# Patient Record
Sex: FEMALE | Race: WHITE
[De-identification: names, ages, dates, MRNs, and addresses within clinical notes are randomized per-mention and may not be internally consistent; named-entity substitution may affect disease eponyms.]

---

## 2017-09-04 ENCOUNTER — HOSPITAL ENCOUNTER (INPATIENT)
Dept: HOSPITAL 8 - ED | Age: 52
LOS: 1 days | Discharge: HOME | DRG: 206 | End: 2017-09-05
Attending: HOSPITALIST | Admitting: HOSPITALIST
Payer: COMMERCIAL

## 2017-09-04 VITALS — DIASTOLIC BLOOD PRESSURE: 72 MMHG | SYSTOLIC BLOOD PRESSURE: 111 MMHG

## 2017-09-04 VITALS — BODY MASS INDEX: 23.88 KG/M2 | HEIGHT: 65 IN | WEIGHT: 143.3 LBS

## 2017-09-04 DIAGNOSIS — F17.210: ICD-10-CM

## 2017-09-04 DIAGNOSIS — M94.0: Primary | ICD-10-CM

## 2017-09-04 DIAGNOSIS — J42: ICD-10-CM

## 2017-09-04 LAB
BUN SERPL-MCNC: 14 MG/DL (ref 7–18)
HCT VFR BLD CALC: 45.3 % (ref 34.6–47.8)
HGB BLD-MCNC: 15.6 G/DL (ref 11.7–16.4)
IS PT STATUS REG ER OR PRE ER?: YES
WBC # BLD AUTO: 6.3 X10^3/UL (ref 3.4–10)

## 2017-09-04 PROCEDURE — 85025 COMPLETE CBC W/AUTO DIFF WBC: CPT

## 2017-09-04 PROCEDURE — 80048 BASIC METABOLIC PNL TOTAL CA: CPT

## 2017-09-04 PROCEDURE — 78452 HT MUSCLE IMAGE SPECT MULT: CPT

## 2017-09-04 PROCEDURE — 93017 CV STRESS TEST TRACING ONLY: CPT

## 2017-09-04 PROCEDURE — 87324 CLOSTRIDIUM AG IA: CPT

## 2017-09-04 PROCEDURE — C9898 INPNT STAY RADIOLABELED ITEM: HCPCS

## 2017-09-04 PROCEDURE — 82040 ASSAY OF SERUM ALBUMIN: CPT

## 2017-09-04 PROCEDURE — 36415 COLL VENOUS BLD VENIPUNCTURE: CPT

## 2017-09-04 PROCEDURE — 84443 ASSAY THYROID STIM HORMONE: CPT

## 2017-09-04 PROCEDURE — 93005 ELECTROCARDIOGRAM TRACING: CPT

## 2017-09-04 PROCEDURE — 84484 ASSAY OF TROPONIN QUANT: CPT

## 2017-09-04 PROCEDURE — 80061 LIPID PANEL: CPT

## 2017-09-04 PROCEDURE — A9502 TC99M TETROFOSMIN: HCPCS

## 2017-09-04 PROCEDURE — 71010: CPT

## 2017-09-05 VITALS — SYSTOLIC BLOOD PRESSURE: 123 MMHG | DIASTOLIC BLOOD PRESSURE: 83 MMHG

## 2017-09-05 VITALS — DIASTOLIC BLOOD PRESSURE: 82 MMHG | SYSTOLIC BLOOD PRESSURE: 125 MMHG

## 2017-09-05 VITALS — SYSTOLIC BLOOD PRESSURE: 121 MMHG | DIASTOLIC BLOOD PRESSURE: 77 MMHG

## 2017-09-05 LAB
IS PT STATUS REG ER OR PRE ER?: NO
IS PT STATUS REG ER OR PRE ER?: NO

## 2017-09-05 RX ADMIN — SODIUM CHLORIDE SCH MLS/HR: 0.9 INJECTION, SOLUTION INTRAVENOUS at 08:16

## 2017-09-05 RX ADMIN — SODIUM CHLORIDE SCH MLS/HR: 0.9 INJECTION, SOLUTION INTRAVENOUS at 00:59

## 2018-02-14 ENCOUNTER — HOSPITAL ENCOUNTER (EMERGENCY)
Dept: HOSPITAL 8 - ED | Age: 53
Discharge: HOME | End: 2018-02-14
Payer: SELF-PAY

## 2018-02-14 VITALS — BODY MASS INDEX: 24.09 KG/M2 | HEIGHT: 66 IN | WEIGHT: 149.91 LBS

## 2018-02-14 VITALS — SYSTOLIC BLOOD PRESSURE: 117 MMHG | DIASTOLIC BLOOD PRESSURE: 71 MMHG

## 2018-02-14 DIAGNOSIS — K29.20: Primary | ICD-10-CM

## 2018-02-14 DIAGNOSIS — Z72.89: ICD-10-CM

## 2018-02-14 DIAGNOSIS — F10.10: ICD-10-CM

## 2018-02-14 LAB
ALBUMIN SERPL-MCNC: 3.8 G/DL (ref 3.4–5)
ALP SERPL-CCNC: 109 U/L (ref 45–117)
ALT SERPL-CCNC: 80 U/L (ref 12–78)
ANION GAP SERPL CALC-SCNC: 14 MMOL/L (ref 5–15)
BASOPHILS # BLD AUTO: 0.02 X10^3/UL (ref 0–0.1)
BASOPHILS NFR BLD AUTO: 1 % (ref 0–1)
BILIRUB SERPL-MCNC: 0.6 MG/DL (ref 0.2–1)
CALCIUM SERPL-MCNC: 8.1 MG/DL (ref 8.5–10.1)
CHLORIDE SERPL-SCNC: 106 MMOL/L (ref 98–107)
CREAT SERPL-MCNC: 0.74 MG/DL (ref 0.55–1.02)
CULTURE INDICATED?: YES
EOSINOPHIL # BLD AUTO: 0.06 X10^3/UL (ref 0–0.4)
EOSINOPHIL NFR BLD AUTO: 1 % (ref 1–7)
ERYTHROCYTE [DISTWIDTH] IN BLOOD BY AUTOMATED COUNT: 13.3 % (ref 9.6–15.2)
LYMPHOCYTES # BLD AUTO: 1.65 X10^3/UL (ref 1–3.4)
LYMPHOCYTES NFR BLD AUTO: 36 % (ref 22–44)
MCH RBC QN AUTO: 35.9 PG (ref 27–34.8)
MCHC RBC AUTO-ENTMCNC: 34.4 G/DL (ref 32.4–35.8)
MCV RBC AUTO: 104.3 FL (ref 80–100)
MD: NO
MICROSCOPIC: (no result)
MONOCYTES # BLD AUTO: 0.39 X10^3/UL (ref 0.2–0.8)
MONOCYTES NFR BLD AUTO: 9 % (ref 2–9)
NEUTROPHILS # BLD AUTO: 2.48 X10^3/UL (ref 1.8–6.8)
NEUTROPHILS NFR BLD AUTO: 54 % (ref 42–75)
PLATELET # BLD AUTO: 181 X10^3/UL (ref 130–400)
PMV BLD AUTO: 7 FL (ref 7.4–10.4)
PROT SERPL-MCNC: 7.9 G/DL (ref 6.4–8.2)
RBC # BLD AUTO: 4.21 X10^6/UL (ref 3.82–5.3)
TROPONIN I SERPL-MCNC: < 0.015 NG/ML (ref 0–0.04)

## 2018-02-14 PROCEDURE — 83690 ASSAY OF LIPASE: CPT

## 2018-02-14 PROCEDURE — 87086 URINE CULTURE/COLONY COUNT: CPT

## 2018-02-14 PROCEDURE — 81001 URINALYSIS AUTO W/SCOPE: CPT

## 2018-02-14 PROCEDURE — 87186 SC STD MICRODIL/AGAR DIL: CPT

## 2018-02-14 PROCEDURE — 84703 CHORIONIC GONADOTROPIN ASSAY: CPT

## 2018-02-14 PROCEDURE — 99284 EMERGENCY DEPT VISIT MOD MDM: CPT

## 2018-02-14 PROCEDURE — 87077 CULTURE AEROBIC IDENTIFY: CPT

## 2018-02-14 PROCEDURE — 80053 COMPREHEN METABOLIC PANEL: CPT

## 2018-02-14 PROCEDURE — 80307 DRUG TEST PRSMV CHEM ANLYZR: CPT

## 2018-02-14 PROCEDURE — 36415 COLL VENOUS BLD VENIPUNCTURE: CPT

## 2018-02-14 PROCEDURE — 86677 HELICOBACTER PYLORI ANTIBODY: CPT

## 2018-02-14 PROCEDURE — 84484 ASSAY OF TROPONIN QUANT: CPT

## 2018-02-14 PROCEDURE — 85025 COMPLETE CBC W/AUTO DIFF WBC: CPT

## 2018-02-26 ENCOUNTER — HOSPITAL ENCOUNTER (EMERGENCY)
Facility: MEDICAL CENTER | Age: 53
End: 2018-02-26
Attending: EMERGENCY MEDICINE

## 2018-02-26 VITALS
HEIGHT: 66 IN | HEART RATE: 111 BPM | WEIGHT: 146.61 LBS | TEMPERATURE: 97 F | SYSTOLIC BLOOD PRESSURE: 124 MMHG | RESPIRATION RATE: 14 BRPM | OXYGEN SATURATION: 94 % | BODY MASS INDEX: 23.56 KG/M2 | DIASTOLIC BLOOD PRESSURE: 84 MMHG

## 2018-02-26 DIAGNOSIS — F10.20 ALCOHOLISM (HCC): ICD-10-CM

## 2018-02-26 DIAGNOSIS — R11.0 NAUSEA: ICD-10-CM

## 2018-02-26 DIAGNOSIS — F10.920 ALCOHOLIC INTOXICATION WITHOUT COMPLICATION (HCC): ICD-10-CM

## 2018-02-26 DIAGNOSIS — F32.A DEPRESSION, UNSPECIFIED DEPRESSION TYPE: ICD-10-CM

## 2018-02-26 DIAGNOSIS — R45.851 SUICIDAL IDEATION: ICD-10-CM

## 2018-02-26 LAB
AMPHET UR QL SCN: NEGATIVE
BARBITURATES UR QL SCN: NEGATIVE
BENZODIAZ UR QL SCN: NEGATIVE
BZE UR QL SCN: NEGATIVE
CANNABINOIDS UR QL SCN: NEGATIVE
METHADONE UR QL SCN: NEGATIVE
OPIATES UR QL SCN: NEGATIVE
OXYCODONE UR QL SCN: NEGATIVE
PCP UR QL SCN: NEGATIVE
POC BREATHALIZER: 0.08 PERCENT (ref 0–0.01)
POC BREATHALIZER: 0.14 PERCENT (ref 0–0.01)
POC BREATHALIZER: 0.34 PERCENT (ref 0–0.01)
PROPOXYPH UR QL SCN: NEGATIVE

## 2018-02-26 PROCEDURE — 99285 EMERGENCY DEPT VISIT HI MDM: CPT

## 2018-02-26 PROCEDURE — 700111 HCHG RX REV CODE 636 W/ 250 OVERRIDE (IP): Performed by: EMERGENCY MEDICINE

## 2018-02-26 PROCEDURE — 302970 POC BREATHALIZER: Performed by: EMERGENCY MEDICINE

## 2018-02-26 PROCEDURE — 80307 DRUG TEST PRSMV CHEM ANLYZR: CPT

## 2018-02-26 PROCEDURE — 302970 POC BREATHALIZER

## 2018-02-26 PROCEDURE — 700102 HCHG RX REV CODE 250 W/ 637 OVERRIDE(OP): Performed by: EMERGENCY MEDICINE

## 2018-02-26 PROCEDURE — A9270 NON-COVERED ITEM OR SERVICE: HCPCS | Performed by: EMERGENCY MEDICINE

## 2018-02-26 PROCEDURE — 90791 PSYCH DIAGNOSTIC EVALUATION: CPT

## 2018-02-26 RX ORDER — ASPIRIN 81 MG/1
81 TABLET, CHEWABLE ORAL EVERY 4 HOURS PRN
COMMUNITY
End: 2018-10-23

## 2018-02-26 RX ORDER — ONDANSETRON 4 MG/1
8 TABLET, ORALLY DISINTEGRATING ORAL ONCE
Status: COMPLETED | OUTPATIENT
Start: 2018-02-26 | End: 2018-02-26

## 2018-02-26 RX ORDER — LORAZEPAM 1 MG/1
1 TABLET ORAL ONCE
Status: COMPLETED | OUTPATIENT
Start: 2018-02-26 | End: 2018-02-26

## 2018-02-26 RX ORDER — HYDROCODONE BITARTRATE AND ACETAMINOPHEN 5; 325 MG/1; MG/1
1-2 TABLET ORAL EVERY 4 HOURS PRN
COMMUNITY
End: 2018-10-23

## 2018-02-26 RX ORDER — IBUPROFEN 200 MG
200 TABLET ORAL EVERY 6 HOURS PRN
COMMUNITY
End: 2018-10-23

## 2018-02-26 RX ORDER — ONDANSETRON 4 MG/1
4 TABLET, ORALLY DISINTEGRATING ORAL EVERY 8 HOURS PRN
Qty: 10 TAB | Refills: 1 | Status: SHIPPED | OUTPATIENT
Start: 2018-02-26 | End: 2018-10-23

## 2018-02-26 RX ORDER — ONDANSETRON 4 MG/1
4 TABLET, ORALLY DISINTEGRATING ORAL ONCE
Status: COMPLETED | OUTPATIENT
Start: 2018-02-26 | End: 2018-02-26

## 2018-02-26 RX ORDER — ACETAMINOPHEN 500 MG
500 TABLET ORAL EVERY 6 HOURS PRN
COMMUNITY
End: 2018-10-23

## 2018-02-26 RX ADMIN — ONDANSETRON 8 MG: 4 TABLET, ORALLY DISINTEGRATING ORAL at 04:33

## 2018-02-26 RX ADMIN — ONDANSETRON 4 MG: 4 TABLET, ORALLY DISINTEGRATING ORAL at 11:30

## 2018-02-26 RX ADMIN — ONDANSETRON 4 MG: 4 TABLET, ORALLY DISINTEGRATING ORAL at 15:15

## 2018-02-26 RX ADMIN — LORAZEPAM 1 MG: 1 TABLET ORAL at 11:30

## 2018-02-26 RX ADMIN — LORAZEPAM 1 MG: 1 TABLET ORAL at 04:33

## 2018-02-26 NOTE — DISCHARGE PLANNING
Alert Team Note: Patient currently intoxicated at .335. Will be available to evaluate patient when she is legally sober.    Jennie Mercado, Ph.D.  Alert Team Therapist

## 2018-02-26 NOTE — ED NOTES
Pt is vomiting at the bedside . notified MD. Nausea medication ordered. Pt is anxious and shaking as well.

## 2018-02-26 NOTE — CONSULTS
"[facility]   TRIAGE ASSESSMENT    Name: Dyan Guevara  MRN: [id number]  : 1965  Age: 52 y.o.  Date of assessment: 2018  PCP: Pcp Unknown  Persons in attendance: Patient    CHIEF COMPLAINT/PRESENTING ISSUE (as stated by ANANYA Romero):   Chief Complaint   Patient presents with   • Suicidal Ideation     denies HI; states she \"thought about taking pills\" to kill herself; pt ETOH, denies drug use        CURRENT LIVING SITUATION/SOCIAL SUPPORT: Pt currently lives at home with her cats.  She reports increased life stress lately; financial troubles, resigned from job at [employer] d/t \"too much stress.\"  She reports increased depression and drinking.  She is a daily drinker, 3-4 mixed drinks, for \"years.\"  She reports she began having SI to \"drink myself to death\" last evening after becoming intoxicated.  She denies any current SI/HI/AH/VH.  She denies any SI before drinking last night.  She is interested in possibly going to rehab, but insists she must go home first, as she has unattended pets to attend to.  (She has mentioned them to several staff members, concerned for their welfare.)      BEHAVIORAL HEALTH TREATMENT HISTORY  Does patient/parent report a history of prior behavioral health treatment for patient?   No: She says her doctor put her on Zoloft once, about 15yrs ago, for depression.  \"It didn't do anything\" and she has not been on it.    SAFETY ASSESSMENT - SELF  Does patient acknowledge current or past symptoms of dangerousness to self? Yes, pt admits to SI last evening while intoxicated.  No current or previous SI/SA.  Pt's brother commit suicide several years ago.      Does parent/significant other report patient has current or past symptoms of dangerousness to self? N\A  Does presenting problem suggest symptoms of dangerousness to self? No    SAFETY ASSESSMENT - OTHERS  Does patient acknowledge current or past symptoms of aggressive behavior or risk to others? no  Does " "parent/significant other report patient has current or past symptoms of aggressive behavior or risk to others?  N\A  Does presenting problem suggest symptoms of dangerousness to others? No    Crisis Safety Plan completed and copy given to patient? N\A    ABUSE/NEGLECT SCREENING  Does patient report feeling “unsafe” in his/her home, or afraid of anyone?  no  Does patient report any history of physical, sexual, or emotional abuse?  no  Does parent or significant other report any of the above? no  Is there evidence of neglect by self?  no  Is there evidence of neglect by a caregiver? no  Does the patient/parent report any history of CPS/APS/police involvement related to suspected abuse/neglect or domestic violence? no  Based on the information provided during the current assessment, is a mandated report of suspected abuse/neglect being made?  No    SUBSTANCE USE SCREENING  Yes:  Isael all substances used in the past 30 days:      Last Use Amount   [x]   Alcohol Daily for years 3-4 whiskey mixed drinks   []   Marijuana     []   Heroin     []   Prescription Opioids  (used without prescription, for    recreation, or in excess of prescribed amount)     []   Other Prescription  (used without prescription, for    recreation, or in excess of prescribed amount)     []   Cocaine      []   Methamphetamine     []   \"\" drugs (ectasy, MDMA)     []   Other substances        UDS results: negative  Breathalyzer results: 0.335 at 0228, 0.078 at 1136.    What consequences does the patient associate with any of the above substance use and or addictive behaviors? Health problems: ended up here in ER    Risk factors for detox (check all that apply):  []  Seizures   []  Diaphoretic (sweating)   [x]  Tremors   []  Hallucinations   []  Increased blood pressure   []  Decreased blood pressure   []  Other   []  None      [x] Patient education on risk factors for detoxification and instructed to return to ER as needed.      MENTAL " STATUS   Participation: Active verbal participation, Attentive and Open to feedback  Grooming: Casual  Orientation: Alert and Fully Oriented  Behavior: Calm  Eye contact: Good  Mood: Depressed  Affect: Constricted  Thought process: Logical and Goal-directed  Thought content: Within normal limits  Speech: Rate within normal limits and Volume within normal limits  Perception: Within normal limits  Memory:  No gross evidence of memory deficits  Insight: Adequate  Judgment:  Limited  Other:    Collateral information: No previous ER visits for similar complaints  Source:  [] Significant other present in person:   [] Significant other by telephone  [] [facility]   [] [facility] Nursing Staff  [x] [facility] Medical Record  [] Other:     [] Unable to complete full assessment due to:  [] Acute intoxication  [] Patient declined to participate/engage  [] Patient verbally unresponsive  [] Significant cognitive deficits  [] Significant perceptual distortions or behavioral disorganization  [] Other:      CLINICAL IMPRESSIONS:  Primary:  Alcohol use disorder  Secondary:  Depressed       IDENTIFIED NEEDS/PLAN:  [Trigger DISPOSITION list for any items marked]    []  Imminent safety risk - self [] Imminent safety risk - others   []  Acute substance withdrawal []  Psychosis/Impaired reality testing   [x]  Mood/anxiety [x]  Substance use/Addictive behavior   [x]  Maladaptive behaviro []  Parent/child conflict   []  Family/Couples conflict []  Biomedical   []  Housing [x]  Financial   []   Legal  Occupational/Educational   []  Domestic violence []  Other:     Disposition: Actively being addressed by [facility] Emergency Department    Does patient express agreement with the above plan? yes    Referral appointment(s) scheduled? N\A    Alert team only:   I have discussed findings and recommendations with Dr. Moss, who is in agreement with these recommendations.     Referral information sent to the following community providers : Pt does  not have insurance.  Gave her information to pursue possible treatment for alcohol use disorder, including [facility] pamphlet, counseling handout.  Encouraged her to get health insurance, even Medicaid, to aid in getting more care.  Gave her information on filling out steve at Welfare office for Medicaid.      Sera Rosen R.N.  2/26/2018

## 2018-02-26 NOTE — ED NOTES
Pt sleeping comfortably in bed, no apparent distress, breathing is easy and unlabored, no new needs identified.

## 2018-02-26 NOTE — ED PROVIDER NOTES
ED Provider Note    Patient is now sober. Patient has been evaluated by behavioral health and found to be not at risk to self. Statements made last night were while intoxicated. Patient has agreed to seek help as an outpatient, and to return if worse or for any concerns. She has been experiencing nausea over the past 2 days, requested prescription for Zofran which will be provided.

## 2018-02-26 NOTE — ED PROVIDER NOTES
"ED Provider Note    Scribed for Jae Villanueva M.D. by Jae Villanueva. 2/26/2018,  3:21 AM.    CHIEF COMPLAINT  Chief Complaint   Patient presents with   • Suicidal Ideation     denies HI; states she \"thought about taking pills\" to kill herself; pt ETOH, denies drug use       HPI  Dyan Guevara is a 52 y.o. female who presents to the Emergency Department for suicidal thoughts. She says she's been considering taking pills to kill herself. She's been having these thoughts for several days, reporting that she is unhappy with her work and her life and her drinking. She says she has been drinking heavily lately, but can quit without withdrawal symptoms. She describes herself as a \"functional alcoholic.\" She denies any specific trigger or interpersonal trauma or triggering events. She denies any recent illness including fevers or chills chest pain or shortness of breath, but does report nausea and vomiting associated with drinking. She reports that in the past, she's been treated for depression with Zoloft, years ago \"but that didn't work.\"    REVIEW OF SYSTEMS  See HPI for further details. All other systems are negative.     PAST MEDICAL HISTORY   alcoholism, depression.    SOCIAL HISTORY  Social History     Social History Main Topics   • Smoking status: Current Every Day Smoker     Packs/day: 0.80     Types: Cigarettes   • Smokeless tobacco: Not on file   • Alcohol use Yes      Comment: 2 x per week   • Drug use: No   • Sexual activity: Not Currently     Partners: Female     History   Drug Use No       SURGICAL HISTORY  patient denies any surgical history    CURRENT MEDICATIONS  Home Medications    **Home medications have not yet been reviewed for this encounter**         ALLERGIES  No Known Allergies    PHYSICAL EXAM  VITAL SIGNS: /73   Pulse 80   Temp 36.1 °C (97 °F)   Resp 18   Ht 1.676 m (5' 6\")   Wt 66.5 kg (146 lb 9.7 oz)   SpO2 94%   BMI 23.66 kg/m²   Pulse ox interpretation: I interpret this " pulse ox as normal.  Constitutional: Alert in moderate emotional distress.  HENT: No signs of trauma, Bilateral external ears normal, Nose normal.   Eyes: Pupils are equal and reactive, Conjunctiva normal, Non-icteric.   Neck: Normal range of motion, No tenderness, Supple, No stridor.    Cardiovascular: Normal peripheral perfusion  Thorax & Lungs: Unlabored respirations, equal chest expansion, no accessory muscle use  Abdomen: Non-distended  Skin:  No erythema, No rash.   Back: Normal alignment and ROM  Extremities: No gross deformity  Musculoskeletal: Good range of motion in all major joints.   Neurologic: Alert, Normal motor function, No focal deficits noted.   Psychiatric: Affect depressed, continued suicidal ideation, Judgment normal, Mood normal.      DIAGNOSTIC STUDIES / PROCEDURES      LABS  Labs Reviewed   POC BREATHALIZER - Abnormal; Notable for the following:        Result Value    POC Breathalizer 0.335 (*)     All other components within normal limits   URINE DRUG SCREEN   POC BREATHALIZER   POC BREATHALIZER   POC BREATHALIZER   POC BREATHALIZER     All labs reviewed by me.    RADIOLOGY  No orders to display     The radiologist's interpretation of all radiological studies have been reviewed by me.    COURSE & MEDICAL DECISION MAKING  Nursing notes, VS, PMSFHx reviewed in chart.     3:21 AM Patient seen and examined at bedside. Differential diagnosis includes but is not limited to suicidal thoughts, depression, chronic alcoholism. Ordered for breathalyzer and urine drug screen and life skills evaluation to evaluate. Patient will be treated with Zofran for her nausea and Ativan, for anxiety and sleep. There is no evidence of withdrawal at this time. This patient is far too intoxicated to have a life skills evaluation. Is likely going to take hours before she can be appropriately evaluated. Meanwhile, she'll need to be monitored for any signs of withdrawal, though she reports that she does not get  withdrawal symptoms. Ordered breathalyzer testing every 6 hours.    DISPOSITION:  Patient will be monitored in the emergency department until she can be evaluated with a legal alcohol level.      FINAL IMPRESSION  1. Suicidal ideation    2. Alcoholic intoxication without complication (CMS-HCC)    3. Alcoholism (CMS-HCC)    4. Depression, unspecified depression type

## 2018-02-26 NOTE — ED TRIAGE NOTES
".Dyan Guevara  .52 y.o.  .  Chief Complaint   Patient presents with   • Suicidal Ideation     denies HI; states she \"thought about taking pills\" to kill herself; pt ETOH, denies drug use     Pt ambulatory to triage for above; denies HI; calm and cooperative at this time. Pt admits to drinking \"3 whiskeys\" tonight, denies drug use. Breathalyzer result: .335. Sad Score 6. Charge RN notified.   "

## 2018-02-26 NOTE — ED NOTES
Med rec completed per pt at bedside  Allergies reviewed  No ABX in last 30 days  Patent reports using tylenol #3 and norco that were  for more than a year and a half.

## 2018-02-26 NOTE — DISCHARGE INSTRUCTIONS
Alcohol Problems  Most adults who drink alcohol drink in moderation (not a lot) are at low risk for developing problems related to their drinking. However, all drinkers, including low-risk drinkers, should know about the health risks connected with drinking alcohol.  RECOMMENDATIONS FOR LOW-RISK DRINKING   Drink in moderation. Moderate drinking is defined as follows:   · Men - no more than 2 drinks per day.  · Nonpregnant women - no more than 1 drink per day.  · Over age 65 - no more than 1 drink per day.  A standard drink is 12 grams of pure alcohol, which is equal to a 12 ounce bottle of beer or wine cooler, a 5 ounce glass of wine, or 1.5 ounces of distilled spirits (such as whiskey, jo, vodka, or rum).   ABSTAIN FROM (DO NOT DRINK) ALCOHOL:  · When pregnant or considering pregnancy.  · When taking a medication that interacts with alcohol.  · If you are alcohol dependent.  · A medical condition that prohibits drinking alcohol (such as ulcer, liver disease, or heart disease).  DISCUSS WITH YOUR CAREGIVER:  · If you are at risk for coronary heart disease, discuss the potential benefits and risks of alcohol use: Light to moderate drinking is associated with lower rates of coronary heart disease in certain populations (for example, men over age 45 and postmenopausal women). Infrequent or nondrinkers are advised not to begin light to moderate drinking to reduce the risk of coronary heart disease so as to avoid creating an alcohol-related problem. Similar protective effects can likely be gained through proper diet and exercise.  · Women and the elderly have smaller amounts of body water than men. As a result women and the elderly achieve a higher blood alcohol concentration after drinking the same amount of alcohol.  · Exposing a fetus to alcohol can cause a broad range of birth defects referred to as Fetal Alcohol Syndrome (FAS) or Alcohol-Related Birth Defects (ARBD). Although FAS/ARBD is connected with excessive  alcohol consumption during pregnancy, studies also have reported neurobehavioral problems in infants born to mothers reporting drinking an average of 1 drink per day during pregnancy.  · Heavier drinking (the consumption of more than 4 drinks per occasion by men and more than 3 drinks per occasion by women) impairs learning (cognitive) and psychomotor functions and increases the risk of alcohol-related problems, including accidents and injuries.  CAGE QUESTIONS:   · Have you ever felt that you should Cut down on your drinking?  · Have people Annoyed you by criticizing your drinking?  · Have you ever felt bad or Guilty about your drinking?  · Have you ever had a drink first thing in the morning to steady your nerves or get rid of a hangover (Eye opener)?  If you answered positively to any of these questions: You may be at risk for alcohol-related problems if alcohol consumption is:   · Men: Greater than 14 drinks per week or more than 4 drinks per occasion.  · Women: Greater than 7 drinks per week or more than 3 drinks per occasion.  Do you or your family have a medical history of alcohol-related problems, such as:  · Blackouts.  · Sexual dysfunction.  · Depression.  · Trauma.  · Liver dysfunction.  · Sleep disorders.  · Hypertension.  · Chronic abdominal pain.  · Has your drinking ever caused you problems, such as problems with your family, problems with your work (or school) performance, or accidents/injuries?  · Do you have a compulsion to drink or a preoccupation with drinking?  · Do you have poor control or are you unable to stop drinking once you have started?  · Do you have to drink to avoid withdrawal symptoms?  · Do you have problems with withdrawal such as tremors, nausea, sweats, or mood disturbances?  · Does it take more alcohol than in the past to get you high?  · Do you feel a strong urge to drink?  · Do you change your plans so that you can have a drink?  · Do you ever drink in the morning to relieve  the shakes or a hangover?  If you have answered a number of the previous questions positively, it may be time for you to talk to your caregivers, family, and friends and see if they think you have a problem. Alcoholism is a chemical dependency that keeps getting worse and will eventually destroy your health and relationships. Many alcoholics end up dead, impoverished, or in care home. This is often the end result of all chemical dependency.  · Do not be discouraged if you are not ready to take action immediately.  · Decisions to change behavior often involve up and down desires to change and feeling like you cannot decide.  · Try to think more seriously about your drinking behavior.  · Think of the reasons to quit.  WHERE TO GO FOR ADDITIONAL INFORMATION   · The National [place] on Alcohol Abuse and Alcoholism (NIAAA)  www.niaaa.nih.gov  · National [place] on Alcoholism and Drug Dependence (NCADD)  www.ncadd.org  · American Society of Addiction Medicine (ASAM)  www.asam.org   Document Released: 12/18/2006 Document Revised: 03/11/2013 Document Reviewed: 08/05/2009  [employer]® Patient Information ©2014 [employer].    Depression, Adult  Depression refers to feeling sad, low, down in the dumps, blue, gloomy, or empty. In general, there are two kinds of depression:  1. Normal sadness or normal grief. This kind of depression is one that we all feel from time to time after upsetting life experiences, such as the loss of a job or the ending of a relationship. This kind of depression is considered normal, is short lived, and resolves within a few days to 2 weeks. Depression experienced after the loss of a loved one (bereavement) often lasts longer than 2 weeks but normally gets better with time.  2. Clinical depression. This kind of depression lasts longer than normal sadness or normal grief or interferes with your ability to function at home, at work, and in school. It also interferes with your personal relationships. It  affects almost every aspect of your life. Clinical depression is an illness.  Symptoms of depression can also be caused by conditions other than those mentioned above, such as:  · Physical illness. Some physical illnesses, including underactive thyroid gland (hypothyroidism), severe anemia, specific types of cancer, diabetes, uncontrolled seizures, heart and lung problems, strokes, and chronic pain are commonly associated with symptoms of depression.  · Side effects of some prescription medicine. In some people, certain types of medicine can cause symptoms of depression.  · Substance abuse. Abuse of alcohol and illicit drugs can cause symptoms of depression.  SYMPTOMS  Symptoms of normal sadness and normal grief include the following:  · Feeling sad or crying for short periods of time.  · Not caring about anything (apathy).  · Difficulty sleeping or sleeping too much.  · No longer able to enjoy the things you used to enjoy.  · Desire to be by oneself all the time (social isolation).  · Lack of energy or motivation.  · Difficulty concentrating or remembering.  · Change in appetite or weight.  · Restlessness or agitation.  Symptoms of clinical depression include the same symptoms of normal sadness or normal grief and also the following symptoms:  · Feeling sad or crying all the time.  · Feelings of guilt or worthlessness.  · Feelings of hopelessness or helplessness.  · Thoughts of suicide or the desire to harm yourself (suicidal ideation).  · Loss of touch with reality (psychotic symptoms). Seeing or hearing things that are not real (hallucinations) or having false beliefs about your life or the people around you (delusions and paranoia).  DIAGNOSIS   The diagnosis of clinical depression is usually based on how bad the symptoms are and how long they have lasted. Your health care provider will also ask you questions about your medical history and substance use to find out if physical illness, use of prescription  medicine, or substance abuse is causing your depression. Your health care provider may also order blood tests.  TREATMENT   Often, normal sadness and normal grief do not require treatment. However, sometimes antidepressant medicine is given for bereavement to ease the depressive symptoms until they resolve.  The treatment for clinical depression depends on how bad the symptoms are but often includes antidepressant medicine, counseling with a mental health professional, or both. Your health care provider will help to determine what treatment is best for you.  Depression caused by physical illness usually goes away with appropriate medical treatment of the illness. If prescription medicine is causing depression, talk with your health care provider about stopping the medicine, decreasing the dose, or changing to another medicine.  Depression caused by the abuse of alcohol or illicit drugs goes away when you stop using these substances. Some adults need professional help in order to stop drinking or using drugs.  SEEK IMMEDIATE MEDICAL CARE IF:  · You have thoughts about hurting yourself or others.  · You lose touch with reality (have psychotic symptoms).  · You are taking medicine for depression and have a serious side effect.  FOR MORE INFORMATION  · National [place] on Mental Illness: www.soham.org   · National [place] of Mental Health: www.nimh.nih.gov      This information is not intended to replace advice given to you by your health care provider. Make sure you discuss any questions you have with your health care provider.     Document Released: 12/15/2001 Document Revised: 01/08/2016 Document Reviewed: 03/18/2013  [employer] Interactive Patient Education ©2016 [employer]

## 2018-04-10 ENCOUNTER — HOSPITAL ENCOUNTER (EMERGENCY)
Dept: HOSPITAL 8 - ED | Age: 53
Discharge: HOME | End: 2018-04-10
Payer: COMMERCIAL

## 2018-04-10 VITALS — SYSTOLIC BLOOD PRESSURE: 101 MMHG | DIASTOLIC BLOOD PRESSURE: 69 MMHG

## 2018-04-10 VITALS — HEIGHT: 66 IN | WEIGHT: 143.3 LBS | BODY MASS INDEX: 23.03 KG/M2
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
## 2018-04-10 DIAGNOSIS — F10.120: ICD-10-CM

## 2018-04-10 DIAGNOSIS — S06.0X0A: Primary | ICD-10-CM

## 2018-04-10 DIAGNOSIS — S02.2XXA: ICD-10-CM
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
## 2018-04-10 DIAGNOSIS — F17.200: ICD-10-CM

## 2018-04-10 PROCEDURE — 70450 CT HEAD/BRAIN W/O DYE: CPT

## 2018-04-10 PROCEDURE — 72125 CT NECK SPINE W/O DYE: CPT

## 2018-04-10 PROCEDURE — 70486 CT MAXILLOFACIAL W/O DYE: CPT

## 2018-04-10 PROCEDURE — 99284 EMERGENCY DEPT VISIT MOD MDM: CPT

## 2018-10-23 ENCOUNTER — HOSPITAL ENCOUNTER (EMERGENCY)
Facility: MEDICAL CENTER | Age: 53
End: 2018-10-23
Attending: EMERGENCY MEDICINE
Payer: COMMERCIAL

## 2018-10-23 ENCOUNTER — APPOINTMENT (OUTPATIENT)
Dept: RADIOLOGY | Facility: MEDICAL CENTER | Age: 53
End: 2018-10-23
Attending: EMERGENCY MEDICINE
Payer: COMMERCIAL

## 2018-10-23 VITALS
BODY MASS INDEX: 23.3 KG/M2 | RESPIRATION RATE: 18 BRPM | WEIGHT: 145 LBS | TEMPERATURE: 98.6 F | DIASTOLIC BLOOD PRESSURE: 67 MMHG | SYSTOLIC BLOOD PRESSURE: 122 MMHG | HEIGHT: 66 IN | HEART RATE: 65 BPM

## 2018-10-23 VITALS
OXYGEN SATURATION: 98 % | BODY MASS INDEX: 23.38 KG/M2 | TEMPERATURE: 98 F | HEIGHT: 66 IN | RESPIRATION RATE: 18 BRPM | SYSTOLIC BLOOD PRESSURE: 112 MMHG | HEART RATE: 61 BPM | WEIGHT: 145.5 LBS | DIASTOLIC BLOOD PRESSURE: 62 MMHG

## 2018-10-23 DIAGNOSIS — F10.920 ALCOHOLIC INTOXICATION WITHOUT COMPLICATION (HCC): ICD-10-CM

## 2018-10-23 DIAGNOSIS — S82.892A CLOSED FRACTURE OF LEFT ANKLE, INITIAL ENCOUNTER: ICD-10-CM

## 2018-10-23 PROCEDURE — 700102 HCHG RX REV CODE 250 W/ 637 OVERRIDE(OP): Performed by: EMERGENCY MEDICINE

## 2018-10-23 PROCEDURE — 99284 EMERGENCY DEPT VISIT MOD MDM: CPT

## 2018-10-23 PROCEDURE — A9270 NON-COVERED ITEM OR SERVICE: HCPCS | Performed by: EMERGENCY MEDICINE

## 2018-10-23 PROCEDURE — 99285 EMERGENCY DEPT VISIT HI MDM: CPT

## 2018-10-23 PROCEDURE — 73610 X-RAY EXAM OF ANKLE: CPT | Mod: LT

## 2018-10-23 PROCEDURE — 29515 APPLICATION SHORT LEG SPLINT: CPT

## 2018-10-23 PROCEDURE — 302874 HCHG BANDAGE ACE 2 OR 3""

## 2018-10-23 RX ORDER — ACETAMINOPHEN 325 MG/1
650 TABLET ORAL ONCE
Status: COMPLETED | OUTPATIENT
Start: 2018-10-23 | End: 2018-10-23

## 2018-10-23 RX ADMIN — ACETAMINOPHEN 650 MG: 325 TABLET, FILM COATED ORAL at 06:38

## 2018-10-23 NOTE — ED NOTES
Discharge instructions reviewed, no questions at this time.    Bus pass provided to patient for transportation.    Belongings returned to patient.    Crutches provided to patient, teaching given. Patient demonstrates understanding of teaching.    Splint care reviewed at length with patient, including instructions to keep splint dry and to not remove on her own as well as to return if having severe discomfort, difficulty moving or feeling toes, or swelling in toes. Patient verbalizes understanding.    Patient escorted out by wheelchair.

## 2018-10-23 NOTE — ED NOTES
Patient refusing blood draw or breathalyzer. MD states if patient can ambulate she can leave. Patient ambulating out of room unassisted.

## 2018-10-23 NOTE — ED TRIAGE NOTES
"Patient to via [facility] from [facility]. Staff called due to severe intoxication and inability to stand. Patient states she had \"several drinks\" but will not be specific as to exactly how many or what kind. States she worked a 12hr hour shift and is just tired. Patient will not cooperate with breathalyzer, and repeatedly stating she doesn't want to go to snf. Patient has not physical complaints other than L sciatic pain.    "

## 2018-10-23 NOTE — ED PROVIDER NOTES
"ED Provider Note    CHIEF COMPLAINT  Chief Complaint   Patient presents with   • Leg Pain     pt c/o sciatic pain in L leg, low back   • Alcohol Intoxication        HPI    Primary care provider: Pcp Pt States None   History obtained from: Patient  History limited by: None     Dyan Guevara is a 52 y.o. female who presents to the ED after she was seen by me about an hour ago and left the ED.  While waiting in the waiting room she decided to check back in to the ED complaining of sciatic pain.  She is reporting chronic left-sided sciatic pain and now also complaining of left ankle injury but refuses to give me any further details.  She has no other concerns or complaints at this time.  She is refusing any blood tests or to do a breathalyzer.    REVIEW OF SYSTEMS  Please see HPI for pertinent positives/negatives.  All other systems reviewed and are negative.     PAST MEDICAL HISTORY  Past Medical History:   Diagnosis Date   • Sciatica         SURGICAL HISTORY  History reviewed. No pertinent surgical history.     SOCIAL HISTORY  Social History     Social History Main Topics   • Smoking status: Current Every Day Smoker     Packs/day: 0.80     Types: Cigarettes   • Smokeless tobacco: Never Used   • Alcohol use Yes      Comment: x2 drinks a day   • Drug use: No   • Sexual activity: Not Currently     Partners: Female        FAMILY HISTORY  History reviewed. No pertinent family history.     CURRENT MEDICATIONS  Home Medications     Reviewed by Rufina Mcknight R.N. (Registered Nurse) on 10/23/18 at 0423  Med List Status: <None>   Medication Last Dose Status        Patient Anthony Taking any Medications                        ALLERGIES  No Known Allergies     PHYSICAL EXAM  VITAL SIGNS: /62   Pulse 61   Temp 36.7 °C (98 °F)   Resp 18   Ht 1.676 m (5' 6\")   Wt 66 kg (145 lb 8.1 oz)   SpO2 98%   BMI 23.48 kg/m²  @RAMAKRISHNA[[id number]::@     Pulse ox interpretation: 98% I interpret this pulse ox as normal "     Constitutional: Well developed, well nourished, alert in no apparent distress, nontoxic appearance    HENT: No external signs of trauma, normocephalic, oropharynx moist and clear, nose normal    Eyes: PERRL, conjunctiva without erythema, no discharge, no icterus    Neck: Soft and supple, trachea midline, no stridor, no tenderness, no LAD, no JVD, good ROM    Cardiovascular: Regular rate and rhythm, no murmurs/rubs/gallops, strong distal pulses and good perfusion    Thorax & Lungs: No respiratory distress, CTAB   Abdomen: Soft, nontender, nondistended, no guarding, no rebound, normal BS    Back: No CVAT    Extremities: No cyanosis, no gross deformity, mild diffuse swelling of left ankle with tenderness to palpation, nontender to palpation proximally or distally, limited range of motion at left ankle due to pain, sensation intact to touch throughout, intact distal pulses with brisk cap refill    Skin: Warm, dry, no pallor/cyanosis, no rash noted    Lymphatic: No lymphadenopathy noted    Neuro: A/O times 3, no focal deficits noted    Psychiatric: Intoxicated and slightly agitated and anxious, denies suicidal or homicidal ideations, no signs of active hallucinations        DIAGNOSTIC STUDIES / PROCEDURES    SPLINT PLACEMENT:  The patient was placed in a posterior short leg and ankle stirrup splint and the splint was applied by ED nurse and tech.  Following splint application I rechecked the position and circulation and neuro function.  The splint was in good position with good neurovascular function.  The left ankle was well immobilized.      LABS  All labs reviewed by me.     Results for orders placed or performed during the hospital encounter of 02/26/18   URINE DRUG SCREEN   Result Value Ref Range    Amphetamines Urine Negative Negative    Barbiturates Negative Negative    Benzodiazepines Negative Negative    Cocaine Metabolite Negative Negative    Methadone Negative Negative    Opiates Negative Negative     Oxycodone Negative Negative    Phencyclidine -Pcp Negative Negative    Propoxyphene Negative Negative    Cannabinoid Metab Negative Negative   POC BREATHALIZER   Result Value Ref Range    POC Breathalizer 0.335 (A) 0.00 - 0.01 Percent   POC BREATHALIZER   Result Value Ref Range    POC Breathalizer 0.140 (A) 0.00 - 0.01 Percent   POC BREATHALIZER   Result Value Ref Range    POC Breathalizer 0.078 (A) 0.00 - 0.01 Percent        RADIOLOGY  The radiologist's interpretation of all radiological studies have been reviewed by me.     DX-ANKLE 3+ VIEWS LEFT   Final Result      1.  Minimally displaced LEFT medial and lateral malleolar fractures with associated soft tissue swelling.   2.  No dislocation.             COURSE & MEDICAL DECISION MAKING  Nursing notes, VS, PMSFHx reviewed in chart.       Differential diagnoses considered include but are not limited to: Fx, dislocation, subluxation, contusion, strain, sprain, bursitis, tendonitis, neuropathy, radiculopathy, neurovascular injury       Patient presents to the ED about an hour after she was seen by me and left the ED.  Patient is now complaining of left ankle pain.  She appears to be in the same condition as when I saw her earlier and still refusing any blood tests or to do a breathalyzer.  She did agree to have x-rays of the left ankle with findings as above.  Her ankle was splinted and she was given crutches as well as acetaminophen in the ED for her pain.  She was advised on importance of outpatient follow-up with orthopedics and given return to ED precautions.  She was also advised on minimizing alcohol use and to seek help if necessary.  Patient verbalized understanding and agreed with plan of care with no further questions or concerns.        The patient is referred to a primary physician for blood pressure management, diabetic screening, and for all other preventative health concerns.       FINAL IMPRESSION  1. Closed fracture of left ankle, initial encounter  Acute   2. Alcoholism /alcohol abuse (HCC) Chronic          DISPOSITION  Patient will be discharged home in stable condition.       FOLLOW UP  Agus Cook M.D.  [address]  [address]  [facility] [address]  [phone]    Call today      [facility]  [address] [address] [phone]  Call today      [facility], Emergency Dept  [address] [facility] [address] [phone]    If symptoms worsen         OUTPATIENT MEDICATIONS  There are no discharge medications for this patient.         Electronically signed by: Samy Vitale, 10/23/2018 6:49 AM      Portions of this record were made with voice recognition software.  Despite my review, spelling/grammar/context errors may still remain.  Interpretation of this chart should be taken in this context.

## 2018-10-23 NOTE — ED TRIAGE NOTES
"Chief Complaint   Patient presents with   • Leg Pain     pt c/o sciatic pain in L leg, low back   • Alcohol Intoxication     Blood pressure 124/63, pulse 78, temperature 36.7 °C (98 °F), resp. rate 16, height 1.676 m (5' 6\"), weight 66 kg (145 lb 8.1 oz), SpO2 97 %.    Pt was discharged from the ED, sat outside and decided to get checked back in c/o sciatic pain and needs to rest. Pt is unsteady on her feet, admits to ETOH but states she had x2 drinks but hasn't eaten anything today.   "

## 2018-10-23 NOTE — ED NOTES
Dr. Vitale bedside for reeval.  Patient to have posterior short leg with ankle stirrup splint placed.

## 2018-10-23 NOTE — ED PROVIDER NOTES
"ED Provider Note    CHIEF COMPLAINT  Chief Complaint   Patient presents with   • Alcohol Intoxication        HPI    Primary care provider: Pcp Pt States None   History obtained from: Patient  History limited by: None     Dyan Guevara is a 52 y.o. female who presents to the ED by EMS for being too intoxicated for the homeless shelter.  Homeless shelter apparently felt that patient was too drunk to stay there because she was stumbling around.  Patient states that she drinks alcohol every day and had \"a couple of drinks\" tonight.  She denies suicidal or homicidal ideation.  Patient states that she works as a  but \"I cannot disclose where I work because I will get fired.\"  Patient states that she has been a  for 14 years.  Patient also states that she has \"sciatica\" on the left side for \"at least 5 years.\"  She denies any recent injury or trauma.  She denies pain anywhere else.  She denies any drug use and states that she only abuses alcohol.  Patient states that \"I went to rehab and I relapsed.\"  Patient is unsure if she has had a fever but \"you can check it.\"  She reports nausea at times but no vomiting today.  She reports both diarrhea and constipation at times.  She denies dysuria.  She denies possibility of pregnancy.  Patient states that she has shortness of breath at times because \"I am a smoker and have COPD\" but denies any current shortness of breath or difficulty breathing.    REVIEW OF SYSTEMS  Please see HPI for pertinent positives/negatives.  All other systems reviewed and are negative.     PAST MEDICAL HISTORY  Past Medical History:   Diagnosis Date   • Sciatica         SURGICAL HISTORY  History reviewed. No pertinent surgical history.     SOCIAL HISTORY  Social History     Social History Main Topics   • Smoking status: Current Every Day Smoker     Packs/day: 0.80     Types: Cigarettes   • Smokeless tobacco: Never Used   • Alcohol use Yes      Comment: x2 drinks a day   • " "Drug use: No   • Sexual activity: Not Currently     Partners: Female        FAMILY HISTORY  History reviewed. No pertinent family history.     CURRENT MEDICATIONS  Home Medications     Reviewed by Yany Fierro R.N. (Registered Nurse) on 10/23/18 at 0238  Med List Status: Complete   Medication Last Dose Status        Patient Anthony Taking any Medications                        ALLERGIES  No Known Allergies     PHYSICAL EXAM  VITAL SIGNS: /67   Pulse 65   Temp 37 °C (98.6 °F)   Resp 18   Ht 1.676 m (5' 6\")   Wt 65.8 kg (145 lb)   BMI 23.40 kg/m²  @RAMAKRISHNA[[id number]::@     Pulse ox interpretation: 97% I interpret this pulse ox as normal     Constitutional: Well developed, well nourished, alert in no apparent distress, nontoxic appearance    HENT: No external signs of trauma, normocephalic, oropharynx moist and clear, nose normal    Eyes: PERRL, conjunctiva without erythema, no discharge, no icterus    Neck: Soft and supple, trachea midline, no stridor, no tenderness, no LAD, no JVD, good ROM    Cardiovascular: Regular rate and rhythm, no murmurs/rubs/gallops, strong distal pulses and good perfusion    Thorax & Lungs: No respiratory distress, CTAB   Abdomen: Soft, nontender, nondistended, no guarding, no rebound, normal BS    Back: No CVAT    Extremities: No cyanosis, left ankle with mild diffuse swelling, no gross deformity, intact distal pulses with brisk cap refill    Skin: Warm, dry, no pallor/cyanosis, no rash noted    Lymphatic: No lymphadenopathy noted    Neuro: A/O times 3, no focal deficits noted    Psychiatric: Intoxicated, denies suicidal homicidal ideation, no signs of active hallucinations        DIAGNOSTIC STUDIES / PROCEDURES        LABS  All labs reviewed by me.     Results for orders placed or performed during the hospital encounter of 02/26/18   URINE DRUG SCREEN   Result Value Ref Range    Amphetamines Urine Negative Negative    Barbiturates Negative Negative    Benzodiazepines Negative " Negative    Cocaine Metabolite Negative Negative    Methadone Negative Negative    Opiates Negative Negative    Oxycodone Negative Negative    Phencyclidine -Pcp Negative Negative    Propoxyphene Negative Negative    Cannabinoid Metab Negative Negative   POC BREATHALIZER   Result Value Ref Range    POC Breathalizer 0.335 (A) 0.00 - 0.01 Percent   POC BREATHALIZER   Result Value Ref Range    POC Breathalizer 0.140 (A) 0.00 - 0.01 Percent   POC BREATHALIZER   Result Value Ref Range    POC Breathalizer 0.078 (A) 0.00 - 0.01 Percent        RADIOLOGY  The radiologist's interpretation of all radiological studies have been reviewed by me.     No orders to display          COURSE & MEDICAL DECISION MAKING  Nursing notes, VS, PMSFHx reviewed in chart.     Review of past medical records shows the patient was last seen in this ED February 26, 2018 for suicidal ideation.      Differential diagnoses considered include but are not limited to: Anxiety, depression, personality disorder, intoxication/overdose      Patient brought by EMS to the ED with above complaint.  Patient is refusing breathalyzer or blood draws or any tests.  She is requesting to leave.  She is noted to ambulate unassisted without difficulty.  Patient is alert and in no acute distress, nontoxic in appearance.  She denies suicidal or homicidal ideation.  There are no signs of active hallucinations.  Patient walked out of the ED per nursing staff.        The patient is referred to a primary physician for blood pressure management, diabetic screening, and for all other preventative health concerns.       FINAL IMPRESSION  1. Alcoholic intoxication without complication (HCC) Active   2. Alcoholism /alcohol abuse (HCC) Chronic          DISPOSITION  Patient will be discharged home in stable condition.       FOLLOW UP  [address] [address]  [phone]  Call today      [facility], Emergency Dept  [address]  [facility] [id number] [phone]    If symptoms worsen         OUTPATIENT MEDICATIONS  There are no discharge medications for this patient.         Electronically signed by: Samy Vitale, 10/23/2018 2:56 AM      Portions of this record were made with voice recognition software.  Despite my review, spelling/grammar/context errors may still remain.  Interpretation of this chart should be taken in this context.

## 2018-10-23 NOTE — ED NOTES
Patient in lobby asleep in wheelchair.  Woke patient up and she was confused why she was being seen here.  Patient couldn't decide if she wanted to be seen or not.  Patient then remembered that she came here for sciatica.  When asked if she still wants to be seen she states yes and gets up to walk back.  Pt ambulatory with steady gait to blue 14 with c/o sciatic leg pain.  Agree with triage assessment.  Pt changed into gown.  Chart up for ERP.

## 2018-10-23 NOTE — DISCHARGE PLANNING
Medical Social Work    Referral:  Shelter Contact    Intervention: MSW received a call from bedside RN as pt requested that the [facility] be contacted regarding her being in the ER.  MSW contacted Trena with the [facility] and let her know that pt is currently being treated in the ER and requested that pt keep her bed for tonight at the shelter.  Bedside RN updated.    Plan: Pt to D/C once medically cleared.

## 2019-01-17 ENCOUNTER — HOSPITAL ENCOUNTER (EMERGENCY)
Dept: HOSPITAL 8 - ED | Age: 54
Discharge: HOME | End: 2019-01-17
Payer: MEDICAID

## 2019-01-17 VITALS — SYSTOLIC BLOOD PRESSURE: 118 MMHG | DIASTOLIC BLOOD PRESSURE: 74 MMHG

## 2019-01-17 VITALS — BODY MASS INDEX: 22.15 KG/M2 | HEIGHT: 67 IN | WEIGHT: 141.1 LBS

## 2019-01-17 DIAGNOSIS — Z72.9: ICD-10-CM
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
## 2019-01-17 DIAGNOSIS — K86.1: ICD-10-CM

## 2019-01-17 DIAGNOSIS — S82.62XA: Primary | ICD-10-CM
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
## 2019-01-17 DIAGNOSIS — S82.55XA: ICD-10-CM

## 2019-01-17 DIAGNOSIS — R89.1: ICD-10-CM

## 2019-01-17 LAB
ALBUMIN SERPL-MCNC: 3.7 G/DL (ref 3.4–5)
ALP SERPL-CCNC: 119 U/L (ref 45–117)
ALT SERPL-CCNC: 55 U/L (ref 12–78)
ANION GAP SERPL CALC-SCNC: 7 MMOL/L (ref 5–15)
BASOPHILS # BLD AUTO: 0.03 X10^3/UL (ref 0–0.1)
BASOPHILS NFR BLD AUTO: 1 % (ref 0–1)
BILIRUB SERPL-MCNC: 0.3 MG/DL (ref 0.2–1)
CALCIUM SERPL-MCNC: 8.5 MG/DL (ref 8.5–10.1)
CHLORIDE SERPL-SCNC: 106 MMOL/L (ref 98–107)
CREAT SERPL-MCNC: 0.64 MG/DL (ref 0.55–1.02)
CULTURE INDICATED?: NO
EOSINOPHIL # BLD AUTO: 0.13 X10^3/UL (ref 0–0.4)
EOSINOPHIL NFR BLD AUTO: 2 % (ref 1–7)
ERYTHROCYTE [DISTWIDTH] IN BLOOD BY AUTOMATED COUNT: 14.5 % (ref 9.6–15.2)
LYMPHOCYTES # BLD AUTO: 1.92 X10^3/UL (ref 1–3.4)
LYMPHOCYTES NFR BLD AUTO: 36 % (ref 22–44)
MCH RBC QN AUTO: 36.8 PG (ref 27–34.8)
MCHC RBC AUTO-ENTMCNC: 34.6 G/DL (ref 32.4–35.8)
MCV RBC AUTO: 106.4 FL (ref 80–100)
MD: NO
MICROSCOPIC: (no result)
MONOCYTES # BLD AUTO: 0.39 X10^3/UL (ref 0.2–0.8)
MONOCYTES NFR BLD AUTO: 7 % (ref 2–9)
NEUTROPHILS # BLD AUTO: 2.9 X10^3/UL (ref 1.8–6.8)
NEUTROPHILS NFR BLD AUTO: 54 % (ref 42–75)
PLATELET # BLD AUTO: 253 X10^3/UL (ref 130–400)
PMV BLD AUTO: 6.9 FL (ref 7.4–10.4)
PROT SERPL-MCNC: 8 G/DL (ref 6.4–8.2)
RBC # BLD AUTO: 4.2 X10^6/UL (ref 3.82–5.3)

## 2019-01-17 PROCEDURE — 29515 APPLICATION SHORT LEG SPLINT: CPT

## 2019-01-17 PROCEDURE — 85025 COMPLETE CBC W/AUTO DIFF WBC: CPT

## 2019-01-17 PROCEDURE — 99284 EMERGENCY DEPT VISIT MOD MDM: CPT

## 2019-01-17 PROCEDURE — 81001 URINALYSIS AUTO W/SCOPE: CPT

## 2019-01-17 PROCEDURE — 36415 COLL VENOUS BLD VENIPUNCTURE: CPT

## 2019-01-17 PROCEDURE — 80053 COMPREHEN METABOLIC PANEL: CPT

## 2019-01-17 PROCEDURE — 83690 ASSAY OF LIPASE: CPT

## 2019-01-17 PROCEDURE — 84703 CHORIONIC GONADOTROPIN ASSAY: CPT

## 2019-01-17 NOTE — NUR
patient YANI WHITE from [facility], C/C to this RN is L ankle pain, lower 
bilateral belly pain.  NATALEE Young at bedside now, VSS on room air, PWD, patient 
endorses 3 alcoholic drinks today, she reports that she initially fell and 
fractured her ankle in Oct, was given a splint and crutches, she says she fell 
again on Jan 6 and re injured the ankle.  RLQ and LLQ Abd pain started 3 months 
ago 8/10, patient states she takes 6-8 200 mg Ibuprofen tablets daily.

## 2019-03-19 ENCOUNTER — HOSPITAL ENCOUNTER (EMERGENCY)
Dept: HOSPITAL 8 - ED | Age: 54
Discharge: HOME | End: 2019-03-19
Payer: MEDICAID

## 2019-03-19 VITALS — DIASTOLIC BLOOD PRESSURE: 69 MMHG | SYSTOLIC BLOOD PRESSURE: 105 MMHG

## 2019-03-19 VITALS — HEIGHT: 62 IN | BODY MASS INDEX: 28.4 KG/M2 | WEIGHT: 154.32 LBS

## 2019-03-19 DIAGNOSIS — L50.0: Primary | ICD-10-CM

## 2019-03-19 DIAGNOSIS — B34.9: ICD-10-CM

## 2019-03-19 DIAGNOSIS — F17.200: ICD-10-CM

## 2019-03-19 PROCEDURE — 99284 EMERGENCY DEPT VISIT MOD MDM: CPT

## 2019-03-19 PROCEDURE — 71046 X-RAY EXAM CHEST 2 VIEWS: CPT

## 2019-03-19 PROCEDURE — 87400 INFLUENZA A/B EACH AG IA: CPT

## 2019-03-19 NOTE — NUR
PRESENTS VIA [facility] FOR BILATERAL EYELID/FOREHEAD/NASAL BRIDGE SWELLING, MUSCLE 
ACHES/CHILLS X 2 DAYS. DENIES TRAUMA, CHANGES IN FOOD/MEDICINES/DETERGENTS. 
DENIES SKIN/THROAT IRRITATION. MULTIPLE SKIN ABNORMALITIES NOTED TO BACK 
(PATIENT REPORTS PROBABLE BUG BITES FROM SLEEPING AT SHELTER). NO GLASSES 
(BROKEN) LEFT EYE 20/50, RIGHT EYE 20/30, BILATERAL 20/30.

## 2019-03-19 NOTE — NUR
Patient resting in bed comfortabley, now afebrile, swelling unchanged. taking 
po fluids/solids w/out difficulty

## 2019-06-15 ENCOUNTER — HOSPITAL ENCOUNTER (EMERGENCY)
Dept: HOSPITAL 8 - ED | Age: 54
Discharge: HOME | End: 2019-06-15
Payer: MEDICAID

## 2019-06-15 VITALS — WEIGHT: 140.3 LBS | HEIGHT: 66 IN | BODY MASS INDEX: 22.55 KG/M2

## 2019-06-15 VITALS — DIASTOLIC BLOOD PRESSURE: 75 MMHG | SYSTOLIC BLOOD PRESSURE: 107 MMHG

## 2019-06-15 DIAGNOSIS — B85.0: Primary | ICD-10-CM

## 2019-06-15 PROCEDURE — 99283 EMERGENCY DEPT VISIT LOW MDM: CPT

## 2019-11-25 ENCOUNTER — HOSPITAL ENCOUNTER (EMERGENCY)
Dept: HOSPITAL 8 - ED | Age: 54
Discharge: HOME | End: 2019-11-25
Payer: MEDICAID

## 2019-11-25 VITALS — WEIGHT: 141.1 LBS | HEIGHT: 65 IN | BODY MASS INDEX: 23.51 KG/M2

## 2019-11-25 VITALS — DIASTOLIC BLOOD PRESSURE: 63 MMHG | SYSTOLIC BLOOD PRESSURE: 98 MMHG

## 2019-11-25 DIAGNOSIS — K64.8: ICD-10-CM

## 2019-11-25 DIAGNOSIS — F10.229: ICD-10-CM

## 2019-11-25 DIAGNOSIS — F17.210: ICD-10-CM

## 2019-11-25 DIAGNOSIS — F41.1: Primary | ICD-10-CM

## 2019-11-25 DIAGNOSIS — K70.10: ICD-10-CM
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
## 2019-11-25 LAB
<PLATELET ESTIMATE>: ADEQUATE
<PLT MORPHOLOGY>: (no result)
ALBUMIN SERPL-MCNC: 3.2 G/DL (ref 3.4–5)
ALP SERPL-CCNC: 264 U/L (ref 45–117)
ALT SERPL-CCNC: 51 U/L (ref 12–78)
ANION GAP SERPL CALC-SCNC: 9 MMOL/L (ref 5–15)
BASOPHILS # BLD AUTO: 0.04 X10^3/UL (ref 0–0.1)
BASOPHILS NFR BLD AUTO: 1 % (ref 0–1)
BILIRUB SERPL-MCNC: 0.9 MG/DL (ref 0.2–1)
CALCIUM SERPL-MCNC: 8.6 MG/DL (ref 8.5–10.1)
CHLORIDE SERPL-SCNC: 105 MMOL/L (ref 98–107)
CREAT SERPL-MCNC: 0.57 MG/DL (ref 0.55–1.02)
CULTURE INDICATED?: YES
EOSINOPHIL # BLD AUTO: 0.12 X10^3/UL (ref 0–0.4)
EOSINOPHIL NFR BLD AUTO: 1 % (ref 1–7)
ERYTHROCYTE [DISTWIDTH] IN BLOOD BY AUTOMATED COUNT: 13.6 % (ref 9.6–15.2)
LYMPHOCYTES # BLD AUTO: 2.17 X10^3/UL (ref 1–3.4)
LYMPHOCYTES NFR BLD AUTO: 25 % (ref 22–44)
MACROCYTES BLD QL SMEAR: (no result)
MCH RBC QN AUTO: 37.9 PG (ref 27–34.8)
MCHC RBC AUTO-ENTMCNC: 33.7 G/DL (ref 32.4–35.8)
MCV RBC AUTO: 112.6 FL (ref 80–100)
MD: (no result)
MICROSCOPIC: (no result)
MONOCYTES # BLD AUTO: 0.55 X10^3/UL (ref 0.2–0.8)
MONOCYTES NFR BLD AUTO: 6 % (ref 2–9)
NEUTROPHILS # BLD AUTO: 5.91 X10^3/UL (ref 1.8–6.8)
NEUTROPHILS NFR BLD AUTO: 67 % (ref 42–75)
PLATELET # BLD AUTO: 245 X10^3/UL (ref 130–400)
PMV BLD AUTO: 7 FL (ref 7.4–10.4)
POLYCHROMASIA BLD QL SMEAR: (no result)
PROT SERPL-MCNC: 8.4 G/DL (ref 6.4–8.2)
RBC # BLD AUTO: 3.88 X10^6/UL (ref 3.82–5.3)

## 2019-11-25 PROCEDURE — 81001 URINALYSIS AUTO W/SCOPE: CPT

## 2019-11-25 PROCEDURE — 87186 SC STD MICRODIL/AGAR DIL: CPT

## 2019-11-25 PROCEDURE — 87086 URINE CULTURE/COLONY COUNT: CPT

## 2019-11-25 PROCEDURE — 87077 CULTURE AEROBIC IDENTIFY: CPT

## 2019-11-25 PROCEDURE — 80307 DRUG TEST PRSMV CHEM ANLYZR: CPT

## 2019-11-25 PROCEDURE — 36415 COLL VENOUS BLD VENIPUNCTURE: CPT

## 2019-11-25 PROCEDURE — 83690 ASSAY OF LIPASE: CPT

## 2019-11-25 PROCEDURE — 71045 X-RAY EXAM CHEST 1 VIEW: CPT

## 2019-11-25 PROCEDURE — 85025 COMPLETE CBC W/AUTO DIFF WBC: CPT

## 2019-11-25 PROCEDURE — 80053 COMPREHEN METABOLIC PANEL: CPT

## 2019-11-25 PROCEDURE — 99284 EMERGENCY DEPT VISIT MOD MDM: CPT

## 2019-11-25 PROCEDURE — 82140 ASSAY OF AMMONIA: CPT

## 2019-11-25 NOTE — NUR
PT TO ED FOR ANX WITH SOB AND INCREASED RR AFTER VERBAL ALTERCATION WITH 
BOYFRIEND. PT STATES SHE "JUST CANNOT CATCH HER BREATH." PT ALSO C/O ABD PAIN 
AND BLOODY STOOL. +ETOH AT THIS TIME. HX PANCREATITIS AND UTI (ON ABX). PT 
CONNECTED TO ALL MONIOTRS. VSS. DR. GUEVARA TO BS FOR ASSESSMENT. ORDERS RECEIVED. 
LABS DRAWN. XR COMPLETE. PT REFUSING PIV AT THIS TIME. DR. GUEVARA NOTIFIED. IV 
MEDS CHANGED TO PO. NO OTHER NEEDS AT THIS TIME. AWAITING RESULTS.

## 2019-11-25 NOTE — NUR
P TUP SELF WITH STEADY GAIT TO  TO PROVIDE UA SAMPLE. UA COLLECTED AND SENT. 
GYN CART SET UP PER REQUEST OF DR. GUEVARA FOR MORE EXTENSIVE RECTAL EXAM.

## 2019-11-25 NOTE — NUR
PT RESTING IN ROOM WITH EYES CLOSED. VSS. NO NEEDS EXPRESSED. CALL LIGHT WITHIN 
REACH. ALL RESULTS BACK AT THIS TIME. CHART UP FOR RECHECK.

## 2020-01-01 ENCOUNTER — APPOINTMENT (OUTPATIENT)
Dept: RADIOLOGY | Facility: MEDICAL CENTER | Age: 55
DRG: 870 | End: 2020-01-01
Attending: INTERNAL MEDICINE
Payer: MEDICAID

## 2020-01-01 ENCOUNTER — HOSPITAL ENCOUNTER (INPATIENT)
Facility: MEDICAL CENTER | Age: 55
LOS: 14 days | DRG: 870 | End: 2020-02-03
Attending: EMERGENCY MEDICINE | Admitting: HOSPITALIST
Payer: MEDICAID

## 2020-01-01 ENCOUNTER — APPOINTMENT (OUTPATIENT)
Dept: RADIOLOGY | Facility: MEDICAL CENTER | Age: 55
DRG: 870 | End: 2020-01-01
Attending: NURSE PRACTITIONER
Payer: MEDICAID

## 2020-01-01 ENCOUNTER — APPOINTMENT (OUTPATIENT)
Dept: RADIOLOGY | Facility: MEDICAL CENTER | Age: 55
DRG: 870 | End: 2020-01-01
Attending: EMERGENCY MEDICINE
Payer: MEDICAID

## 2020-01-01 VITALS
BODY MASS INDEX: 24.55 KG/M2 | SYSTOLIC BLOOD PRESSURE: 81 MMHG | DIASTOLIC BLOOD PRESSURE: 40 MMHG | RESPIRATION RATE: 28 BRPM | HEIGHT: 66 IN | TEMPERATURE: 97.8 F | HEART RATE: 61 BPM | OXYGEN SATURATION: 88 % | WEIGHT: 152.78 LBS

## 2020-01-01 DIAGNOSIS — F10.10 ALCOHOL ABUSE: ICD-10-CM

## 2020-01-01 DIAGNOSIS — K92.2 GASTROINTESTINAL HEMORRHAGE, UNSPECIFIED GASTROINTESTINAL HEMORRHAGE TYPE: ICD-10-CM

## 2020-01-01 DIAGNOSIS — N17.9 AKI (ACUTE KIDNEY INJURY) (HCC): ICD-10-CM

## 2020-01-01 DIAGNOSIS — D68.9 COAGULOPATHY (HCC): ICD-10-CM

## 2020-01-01 DIAGNOSIS — K92.2 UPPER GI BLEED: ICD-10-CM

## 2020-01-01 DIAGNOSIS — E87.20 METABOLIC ACIDOSIS: ICD-10-CM

## 2020-01-01 DIAGNOSIS — D64.9 ANEMIA, UNSPECIFIED TYPE: ICD-10-CM

## 2020-01-01 DIAGNOSIS — E87.5 HYPERKALEMIA: ICD-10-CM

## 2020-01-01 DIAGNOSIS — R65.21 SEPTIC SHOCK (HCC): ICD-10-CM

## 2020-01-01 DIAGNOSIS — J81.0 ACUTE PULMONARY EDEMA (HCC): ICD-10-CM

## 2020-01-01 DIAGNOSIS — K70.10 ALCOHOLIC HEPATITIS WITHOUT ASCITES: ICD-10-CM

## 2020-01-01 DIAGNOSIS — A41.9 SEPTIC SHOCK (HCC): ICD-10-CM

## 2020-01-01 DIAGNOSIS — R79.89 INCREASED AMMONIA LEVEL: ICD-10-CM

## 2020-01-01 DIAGNOSIS — E87.20 LACTIC ACIDOSIS: ICD-10-CM

## 2020-01-01 DIAGNOSIS — N19 RENAL FAILURE, UNSPECIFIED CHRONICITY: ICD-10-CM

## 2020-01-01 DIAGNOSIS — R57.8 HEMORRHAGIC SHOCK (HCC): ICD-10-CM

## 2020-01-01 LAB
25(OH)D3 SERPL-MCNC: 12 NG/ML (ref 30–100)
ABO + RH BLD: NORMAL
ABO GROUP BLD: NORMAL
ACTION RANGE TRIGGERED IACRT: NO
ACTION RANGE TRIGGERED IACRT: YES
ACTION RANGE TRIGGERED IACRT: YES
ALBUMIN SERPL BCP-MCNC: 2.3 G/DL (ref 3.2–4.9)
ALBUMIN SERPL BCP-MCNC: 2.4 G/DL (ref 3.2–4.9)
ALBUMIN SERPL BCP-MCNC: 2.5 G/DL (ref 3.2–4.9)
ALBUMIN SERPL ELPH-MCNC: 2.48 G/DL (ref 3.75–5.01)
ALBUMIN/GLOB SERPL: 0.6 G/DL
ALBUMIN/GLOB SERPL: 0.7 G/DL
ALP SERPL-CCNC: 195 U/L (ref 30–99)
ALP SERPL-CCNC: 195 U/L (ref 30–99)
ALP SERPL-CCNC: 197 U/L (ref 30–99)
ALP SERPL-CCNC: 200 U/L (ref 30–99)
ALP SERPL-CCNC: 212 U/L (ref 30–99)
ALP SERPL-CCNC: 220 U/L (ref 30–99)
ALP SERPL-CCNC: 223 U/L (ref 30–99)
ALP SERPL-CCNC: 262 U/L (ref 30–99)
ALP SERPL-CCNC: 335 U/L (ref 30–99)
ALP SERPL-CCNC: 348 U/L (ref 30–99)
ALP SERPL-CCNC: 368 U/L (ref 30–99)
ALP SERPL-CCNC: 371 U/L (ref 30–99)
ALPHA1 GLOB SERPL ELPH-MCNC: 0.58 G/DL (ref 0.19–0.46)
ALPHA2 GLOB SERPL ELPH-MCNC: 0.84 G/DL (ref 0.48–1.05)
ALT SERPL-CCNC: 23 U/L (ref 2–50)
ALT SERPL-CCNC: 23 U/L (ref 2–50)
ALT SERPL-CCNC: 28 U/L (ref 2–50)
ALT SERPL-CCNC: 30 U/L (ref 2–50)
ALT SERPL-CCNC: 33 U/L (ref 2–50)
ALT SERPL-CCNC: 40 U/L (ref 2–50)
ALT SERPL-CCNC: 43 U/L (ref 2–50)
ALT SERPL-CCNC: 49 U/L (ref 2–50)
ALT SERPL-CCNC: 52 U/L (ref 2–50)
ALT SERPL-CCNC: 54 U/L (ref 2–50)
ALT SERPL-CCNC: 55 U/L (ref 2–50)
ALT SERPL-CCNC: 58 U/L (ref 2–50)
AMMONIA PLAS-SCNC: 70 UMOL/L (ref 11–45)
AMMONIA PLAS-SCNC: 73 UMOL/L (ref 11–45)
AMPHET UR QL SCN: NEGATIVE
ANCA IGG TITR SER IF: NORMAL {TITER}
ANION GAP SERPL CALC-SCNC: 11 MMOL/L (ref 0–11.9)
ANION GAP SERPL CALC-SCNC: 13 MMOL/L (ref 0–11.9)
ANION GAP SERPL CALC-SCNC: 13 MMOL/L (ref 0–11.9)
ANION GAP SERPL CALC-SCNC: 14 MMOL/L (ref 0–11.9)
ANION GAP SERPL CALC-SCNC: 15 MMOL/L (ref 0–11.9)
ANION GAP SERPL CALC-SCNC: 15 MMOL/L (ref 0–11.9)
ANION GAP SERPL CALC-SCNC: 17 MMOL/L (ref 0–11.9)
ANION GAP SERPL CALC-SCNC: 18 MMOL/L (ref 0–11.9)
ANION GAP SERPL CALC-SCNC: 18 MMOL/L (ref 0–11.9)
ANION GAP SERPL CALC-SCNC: 21 MMOL/L (ref 0–11.9)
ANION GAP SERPL CALC-SCNC: 24 MMOL/L (ref 0–11.9)
ANION GAP SERPL CALC-SCNC: 35 MMOL/L (ref 0–11.9)
ANION GAP SERPL CALC-SCNC: 40 MMOL/L (ref 0–11.9)
ANISOCYTOSIS BLD QL SMEAR: ABNORMAL
APPEARANCE UR: ABNORMAL
APTT PPP: 63.4 SEC (ref 24.7–36)
AST SERPL-CCNC: 111 U/L (ref 12–45)
AST SERPL-CCNC: 139 U/L (ref 12–45)
AST SERPL-CCNC: 142 U/L (ref 12–45)
AST SERPL-CCNC: 143 U/L (ref 12–45)
AST SERPL-CCNC: 145 U/L (ref 12–45)
AST SERPL-CCNC: 145 U/L (ref 12–45)
AST SERPL-CCNC: 147 U/L (ref 12–45)
AST SERPL-CCNC: 153 U/L (ref 12–45)
AST SERPL-CCNC: 159 U/L (ref 12–45)
AST SERPL-CCNC: 162 U/L (ref 12–45)
AST SERPL-CCNC: 171 U/L (ref 12–45)
AST SERPL-CCNC: 174 U/L (ref 12–45)
B-GLOBULIN SERPL ELPH-MCNC: 0.83 G/DL (ref 0.48–1.1)
BACTERIA #/AREA URNS HPF: ABNORMAL /HPF
BACTERIA BLD CULT: NORMAL
BACTERIA UR CULT: ABNORMAL
BACTERIA UR CULT: ABNORMAL
BARBITURATES UR QL SCN: NEGATIVE
BARCODED ABORH UBTYP: 5100
BARCODED ABORH UBTYP: 6200
BARCODED ABORH UBTYP: 9500
BARCODED PRD CODE UBPRD: NORMAL
BARCODED UNIT NUM UBUNT: NORMAL
BASE EXCESS BLDA CALC-SCNC: -19 MMOL/L (ref -4–3)
BASE EXCESS BLDA CALC-SCNC: -5 MMOL/L (ref -4–3)
BASE EXCESS BLDA CALC-SCNC: 1 MMOL/L (ref -4–3)
BASE EXCESS BLDA CALC-SCNC: 2 MMOL/L (ref -4–3)
BASE EXCESS BLDA CALC-SCNC: 4 MMOL/L (ref -4–3)
BASE EXCESS BLDA CALC-SCNC: 4 MMOL/L (ref -4–3)
BASE EXCESS BLDA CALC-SCNC: 6 MMOL/L (ref -4–3)
BASE EXCESS BLDA CALC-SCNC: 8 MMOL/L (ref -4–3)
BASE EXCESS BLDV CALC-SCNC: -14 MMOL/L (ref -4–3)
BASOPHILS # BLD AUTO: 0 % (ref 0–1.8)
BASOPHILS # BLD AUTO: 0.4 % (ref 0–1.8)
BASOPHILS # BLD AUTO: 0.5 % (ref 0–1.8)
BASOPHILS # BLD AUTO: 0.5 % (ref 0–1.8)
BASOPHILS # BLD AUTO: 0.6 % (ref 0–1.8)
BASOPHILS # BLD AUTO: 0.7 % (ref 0–1.8)
BASOPHILS # BLD AUTO: 0.8 % (ref 0–1.8)
BASOPHILS # BLD: 0 K/UL (ref 0–0.12)
BASOPHILS # BLD: 0.08 K/UL (ref 0–0.12)
BASOPHILS # BLD: 0.1 K/UL (ref 0–0.12)
BASOPHILS # BLD: 0.11 K/UL (ref 0–0.12)
BASOPHILS # BLD: 0.11 K/UL (ref 0–0.12)
BASOPHILS # BLD: 0.13 K/UL (ref 0–0.12)
BASOPHILS # BLD: 0.19 K/UL (ref 0–0.12)
BENZODIAZ UR QL SCN: NEGATIVE
BILIRUB SERPL-MCNC: 10.4 MG/DL (ref 0.1–1.5)
BILIRUB SERPL-MCNC: 11.9 MG/DL (ref 0.1–1.5)
BILIRUB SERPL-MCNC: 14.4 MG/DL (ref 0.1–1.5)
BILIRUB SERPL-MCNC: 15.9 MG/DL (ref 0.1–1.5)
BILIRUB SERPL-MCNC: 17.5 MG/DL (ref 0.1–1.5)
BILIRUB SERPL-MCNC: 19.9 MG/DL (ref 0.1–1.5)
BILIRUB SERPL-MCNC: 20.1 MG/DL (ref 0.1–1.5)
BILIRUB SERPL-MCNC: 21.6 MG/DL (ref 0.1–1.5)
BILIRUB SERPL-MCNC: 21.9 MG/DL (ref 0.1–1.5)
BILIRUB SERPL-MCNC: 7.9 MG/DL (ref 0.1–1.5)
BILIRUB SERPL-MCNC: 8.5 MG/DL (ref 0.1–1.5)
BILIRUB SERPL-MCNC: 9.5 MG/DL (ref 0.1–1.5)
BILIRUB UR QL STRIP.AUTO: ABNORMAL
BLD GP AB SCN SERPL QL: NORMAL
BODY TEMPERATURE: ABNORMAL CENTIGRADE
BODY TEMPERATURE: ABNORMAL DEGREES
BUN SERPL-MCNC: 100 MG/DL (ref 8–22)
BUN SERPL-MCNC: 181 MG/DL (ref 8–22)
BUN SERPL-MCNC: 20 MG/DL (ref 8–22)
BUN SERPL-MCNC: 206 MG/DL (ref 8–22)
BUN SERPL-MCNC: 22 MG/DL (ref 8–22)
BUN SERPL-MCNC: 30 MG/DL (ref 8–22)
BUN SERPL-MCNC: 46 MG/DL (ref 8–22)
BUN SERPL-MCNC: 47 MG/DL (ref 8–22)
BUN SERPL-MCNC: 52 MG/DL (ref 8–22)
BUN SERPL-MCNC: 69 MG/DL (ref 8–22)
BUN SERPL-MCNC: 78 MG/DL (ref 8–22)
BUN SERPL-MCNC: 78 MG/DL (ref 8–22)
BUN SERPL-MCNC: 82 MG/DL (ref 8–22)
BUN SERPL-MCNC: 86 MG/DL (ref 8–22)
BUN SERPL-MCNC: 89 MG/DL (ref 8–22)
BZE UR QL SCN: NEGATIVE
C3 SERPL-MCNC: 126 MG/DL (ref 87–200)
C4 SERPL-MCNC: 27 MG/DL (ref 19–52)
CA-I BLD ISE-SCNC: 0.68 MMOL/L (ref 1.1–1.3)
CA-I SERPL-SCNC: 0.8 MMOL/L (ref 1.1–1.3)
CA-I SERPL-SCNC: 0.8 MMOL/L (ref 1.1–1.3)
CALCIUM SERPL-MCNC: 5 MG/DL (ref 8.5–10.5)
CALCIUM SERPL-MCNC: 6.1 MG/DL (ref 8.5–10.5)
CALCIUM SERPL-MCNC: 6.4 MG/DL (ref 8.5–10.5)
CALCIUM SERPL-MCNC: 6.6 MG/DL (ref 8.5–10.5)
CALCIUM SERPL-MCNC: 6.8 MG/DL (ref 8.5–10.5)
CALCIUM SERPL-MCNC: 6.8 MG/DL (ref 8.5–10.5)
CALCIUM SERPL-MCNC: 6.9 MG/DL (ref 8.5–10.5)
CALCIUM SERPL-MCNC: 7.2 MG/DL (ref 8.5–10.5)
CALCIUM SERPL-MCNC: 7.4 MG/DL (ref 8.5–10.5)
CALCIUM SERPL-MCNC: 7.8 MG/DL (ref 8.5–10.5)
CALCIUM SERPL-MCNC: 7.8 MG/DL (ref 8.5–10.5)
CALCIUM SERPL-MCNC: 8 MG/DL (ref 8.5–10.5)
CALCIUM SERPL-MCNC: 8.3 MG/DL (ref 8.5–10.5)
CANNABINOIDS UR QL SCN: NEGATIVE
CFT BLD TEG: 10 MIN (ref 5–10)
CFT BLD TEG: 11.8 MIN (ref 5–10)
CFT P HPASE BLD TEG: 11.1 MIN (ref 5–10)
CHLORIDE SERPL-SCNC: 100 MMOL/L (ref 96–112)
CHLORIDE SERPL-SCNC: 101 MMOL/L (ref 96–112)
CHLORIDE SERPL-SCNC: 102 MMOL/L (ref 96–112)
CHLORIDE SERPL-SCNC: 77 MMOL/L (ref 96–112)
CHLORIDE SERPL-SCNC: 85 MMOL/L (ref 96–112)
CHLORIDE SERPL-SCNC: 87 MMOL/L (ref 96–112)
CHLORIDE SERPL-SCNC: 87 MMOL/L (ref 96–112)
CHLORIDE SERPL-SCNC: 90 MMOL/L (ref 96–112)
CHLORIDE SERPL-SCNC: 95 MMOL/L (ref 96–112)
CHLORIDE SERPL-SCNC: 96 MMOL/L (ref 96–112)
CHLORIDE SERPL-SCNC: 98 MMOL/L (ref 96–112)
CHLORIDE SERPL-SCNC: 99 MMOL/L (ref 96–112)
CHLORIDE SERPL-SCNC: 99 MMOL/L (ref 96–112)
CK SERPL-CCNC: 337 U/L (ref 0–154)
CLOT ANGLE BLD TEG: 64.4 DEGREES (ref 53–72)
CLOT ANGLE BLD TEG: 71.7 DEGREES (ref 53–72)
CLOT ANGLE P HPASE BLD TEG: 76.8 DEGREES (ref 53–72)
CLOT INIT P HPASE BLD TEG: 1.1 MIN (ref 1–3)
CLOT LYSIS 30M P MA LENFR BLD TEG: 0 % (ref 0–8)
CLOT LYSIS 30M P MA LENFR BLD TEG: 0 % (ref 0–8)
CLOT LYSIS 30M P MA LENFR BLD TEG: 0.8 % (ref 0–8)
CO2 BLDA-SCNC: 18 MMOL/L (ref 20–33)
CO2 BLDA-SCNC: 24 MMOL/L (ref 20–33)
CO2 BLDA-SCNC: 24 MMOL/L (ref 20–33)
CO2 BLDA-SCNC: 27 MMOL/L (ref 20–33)
CO2 BLDA-SCNC: 27 MMOL/L (ref 20–33)
CO2 BLDA-SCNC: 28 MMOL/L (ref 20–33)
CO2 BLDA-SCNC: 30 MMOL/L (ref 20–33)
CO2 BLDA-SCNC: 32 MMOL/L (ref 20–33)
CO2 BLDA-SCNC: 33 MMOL/L (ref 20–33)
CO2 BLDV-SCNC: 14 MMOL/L (ref 20–33)
CO2 SERPL-SCNC: 21 MMOL/L (ref 20–33)
CO2 SERPL-SCNC: 21 MMOL/L (ref 20–33)
CO2 SERPL-SCNC: 23 MMOL/L (ref 20–33)
CO2 SERPL-SCNC: 23 MMOL/L (ref 20–33)
CO2 SERPL-SCNC: 24 MMOL/L (ref 20–33)
CO2 SERPL-SCNC: 25 MMOL/L (ref 20–33)
CO2 SERPL-SCNC: 26 MMOL/L (ref 20–33)
CO2 SERPL-SCNC: 27 MMOL/L (ref 20–33)
CO2 SERPL-SCNC: 7 MMOL/L (ref 20–33)
CO2 SERPL-SCNC: 7 MMOL/L (ref 20–33)
COLOR UR: ABNORMAL
COMMENT 1642: NORMAL
COMPONENT F 8504F: NORMAL
COMPONENT FT 8504FT: NORMAL
COMPONENT R 8504R: NORMAL
CORTIS SERPL-MCNC: 30.8 UG/DL (ref 0–23)
CREAT SERPL-MCNC: 1.57 MG/DL (ref 0.5–1.4)
CREAT SERPL-MCNC: 1.9 MG/DL (ref 0.5–1.4)
CREAT SERPL-MCNC: 11.6 MG/DL (ref 0.5–1.4)
CREAT SERPL-MCNC: 13.41 MG/DL (ref 0.5–1.4)
CREAT SERPL-MCNC: 2.22 MG/DL (ref 0.5–1.4)
CREAT SERPL-MCNC: 2.39 MG/DL (ref 0.5–1.4)
CREAT SERPL-MCNC: 2.48 MG/DL (ref 0.5–1.4)
CREAT SERPL-MCNC: 3 MG/DL (ref 0.5–1.4)
CREAT SERPL-MCNC: 3.33 MG/DL (ref 0.5–1.4)
CREAT SERPL-MCNC: 4.35 MG/DL (ref 0.5–1.4)
CREAT SERPL-MCNC: 5.55 MG/DL (ref 0.5–1.4)
CREAT SERPL-MCNC: 5.58 MG/DL (ref 0.5–1.4)
CREAT SERPL-MCNC: 5.98 MG/DL (ref 0.5–1.4)
CREAT SERPL-MCNC: 6.38 MG/DL (ref 0.5–1.4)
CREAT SERPL-MCNC: 6.45 MG/DL (ref 0.5–1.4)
CRP SERPL HS-MCNC: 11.84 MG/DL (ref 0–0.75)
CRP SERPL HS-MCNC: 16.67 MG/DL (ref 0–0.75)
CT.EXTRINSIC BLD ROTEM: 1.5 MIN (ref 1–3)
CT.EXTRINSIC BLD ROTEM: 1.9 MIN (ref 1–3)
DACRYOCYTES BLD QL SMEAR: NORMAL
EOSINOPHIL # BLD AUTO: 0 K/UL (ref 0–0.51)
EOSINOPHIL # BLD AUTO: 0.01 K/UL (ref 0–0.51)
EOSINOPHIL # BLD AUTO: 0.01 K/UL (ref 0–0.51)
EOSINOPHIL # BLD AUTO: 0.06 K/UL (ref 0–0.51)
EOSINOPHIL # BLD AUTO: 0.1 K/UL (ref 0–0.51)
EOSINOPHIL # BLD AUTO: 0.15 K/UL (ref 0–0.51)
EOSINOPHIL NFR BLD: 0 % (ref 0–6.9)
EOSINOPHIL NFR BLD: 0.1 % (ref 0–6.9)
EOSINOPHIL NFR BLD: 0.3 % (ref 0–6.9)
EOSINOPHIL NFR BLD: 0.5 % (ref 0–6.9)
EOSINOPHIL NFR BLD: 0.9 % (ref 0–6.9)
EPI CELLS #/AREA URNS HPF: ABNORMAL /HPF
ERYTHROCYTE [DISTWIDTH] IN BLOOD BY AUTOMATED COUNT: 65.4 FL (ref 35.9–50)
ERYTHROCYTE [DISTWIDTH] IN BLOOD BY AUTOMATED COUNT: 81.1 FL (ref 35.9–50)
ERYTHROCYTE [DISTWIDTH] IN BLOOD BY AUTOMATED COUNT: 86.9 FL (ref 35.9–50)
ERYTHROCYTE [DISTWIDTH] IN BLOOD BY AUTOMATED COUNT: 93 FL (ref 35.9–50)
ERYTHROCYTE [DISTWIDTH] IN BLOOD BY AUTOMATED COUNT: 94.6 FL (ref 35.9–50)
ERYTHROCYTE [DISTWIDTH] IN BLOOD BY AUTOMATED COUNT: 95.4 FL (ref 35.9–50)
ERYTHROCYTE [DISTWIDTH] IN BLOOD BY AUTOMATED COUNT: 96.9 FL (ref 35.9–50)
ERYTHROCYTE [DISTWIDTH] IN BLOOD BY AUTOMATED COUNT: 96.9 FL (ref 35.9–50)
ERYTHROCYTE [DISTWIDTH] IN BLOOD BY AUTOMATED COUNT: 98.4 FL (ref 35.9–50)
ERYTHROCYTE [DISTWIDTH] IN BLOOD BY AUTOMATED COUNT: 99.7 FL (ref 35.9–50)
ERYTHROCYTE [SEDIMENTATION RATE] IN BLOOD BY WESTERGREN METHOD: 41 MM/HOUR (ref 0–30)
FERRITIN SERPL-MCNC: 1806.3 NG/ML (ref 10–291)
FIBRINOGEN PPP-MCNC: 536 MG/DL (ref 215–460)
FLUAV RNA SPEC QL NAA+PROBE: NEGATIVE
FLUBV RNA SPEC QL NAA+PROBE: NEGATIVE
GAMMA GLOB SERPL ELPH-MCNC: 1.38 G/DL (ref 0.62–1.51)
GLOBULIN SER CALC-MCNC: 3.5 G/DL (ref 1.9–3.5)
GLOBULIN SER CALC-MCNC: 3.6 G/DL (ref 1.9–3.5)
GLOBULIN SER CALC-MCNC: 3.7 G/DL (ref 1.9–3.5)
GLOBULIN SER CALC-MCNC: 3.9 G/DL (ref 1.9–3.5)
GLOBULIN SER CALC-MCNC: 4 G/DL (ref 1.9–3.5)
GLOBULIN SER CALC-MCNC: 4.1 G/DL (ref 1.9–3.5)
GLOBULIN SER CALC-MCNC: 4.3 G/DL (ref 1.9–3.5)
GLUCOSE BLD-MCNC: 114 MG/DL (ref 65–99)
GLUCOSE BLD-MCNC: 117 MG/DL (ref 65–99)
GLUCOSE BLD-MCNC: 119 MG/DL (ref 65–99)
GLUCOSE BLD-MCNC: 137 MG/DL (ref 65–99)
GLUCOSE BLD-MCNC: 142 MG/DL (ref 65–99)
GLUCOSE BLD-MCNC: 145 MG/DL (ref 65–99)
GLUCOSE BLD-MCNC: 153 MG/DL (ref 65–99)
GLUCOSE BLD-MCNC: 156 MG/DL (ref 65–99)
GLUCOSE BLD-MCNC: 171 MG/DL (ref 65–99)
GLUCOSE BLD-MCNC: 178 MG/DL (ref 65–99)
GLUCOSE BLD-MCNC: 182 MG/DL (ref 65–99)
GLUCOSE BLD-MCNC: 220 MG/DL (ref 65–99)
GLUCOSE BLD-MCNC: 95 MG/DL (ref 65–99)
GLUCOSE SERPL-MCNC: 105 MG/DL (ref 65–99)
GLUCOSE SERPL-MCNC: 123 MG/DL (ref 65–99)
GLUCOSE SERPL-MCNC: 134 MG/DL (ref 65–99)
GLUCOSE SERPL-MCNC: 136 MG/DL (ref 65–99)
GLUCOSE SERPL-MCNC: 140 MG/DL (ref 65–99)
GLUCOSE SERPL-MCNC: 140 MG/DL (ref 65–99)
GLUCOSE SERPL-MCNC: 154 MG/DL (ref 65–99)
GLUCOSE SERPL-MCNC: 160 MG/DL (ref 65–99)
GLUCOSE SERPL-MCNC: 171 MG/DL (ref 65–99)
GLUCOSE SERPL-MCNC: 172 MG/DL (ref 65–99)
GLUCOSE SERPL-MCNC: 185 MG/DL (ref 65–99)
GLUCOSE SERPL-MCNC: 201 MG/DL (ref 65–99)
GLUCOSE SERPL-MCNC: 253 MG/DL (ref 65–99)
GLUCOSE SERPL-MCNC: 254 MG/DL (ref 65–99)
GLUCOSE SERPL-MCNC: 72 MG/DL (ref 65–99)
GLUCOSE UR STRIP.AUTO-MCNC: NEGATIVE MG/DL
HAV IGM SERPL QL IA: NEGATIVE
HBV CORE IGM SER QL: NEGATIVE
HBV SURFACE AB SERPL IA-ACNC: >1000 MIU/ML (ref 0–10)
HBV SURFACE AG SER QL: NEGATIVE
HCO3 BLDA-SCNC: 17.8 MMOL/L (ref 17–25)
HCO3 BLDA-SCNC: 23.1 MMOL/L (ref 17–25)
HCO3 BLDA-SCNC: 23.7 MMOL/L (ref 17–25)
HCO3 BLDA-SCNC: 25.4 MMOL/L (ref 17–25)
HCO3 BLDA-SCNC: 25.5 MMOL/L (ref 17–25)
HCO3 BLDA-SCNC: 26.7 MMOL/L (ref 17–25)
HCO3 BLDA-SCNC: 28.7 MMOL/L (ref 17–25)
HCO3 BLDA-SCNC: 30.9 MMOL/L (ref 17–25)
HCO3 BLDA-SCNC: 31.5 MMOL/L (ref 17–25)
HCO3 BLDA-SCNC: 7 MMOL/L (ref 17–25)
HCO3 BLDV-SCNC: 12.7 MMOL/L (ref 24–28)
HCT VFR BLD AUTO: 13.3 % (ref 37–47)
HCT VFR BLD AUTO: 23 % (ref 37–47)
HCT VFR BLD AUTO: 25.9 % (ref 37–47)
HCT VFR BLD AUTO: 26.9 % (ref 37–47)
HCT VFR BLD AUTO: 27.2 % (ref 37–47)
HCT VFR BLD AUTO: 27.2 % (ref 37–47)
HCT VFR BLD AUTO: 27.7 % (ref 37–47)
HCT VFR BLD AUTO: 27.7 % (ref 37–47)
HCT VFR BLD AUTO: 28 % (ref 37–47)
HCT VFR BLD AUTO: 28.1 % (ref 37–47)
HCT VFR BLD CALC: 24 % (ref 37–47)
HCV AB SER QL: NEGATIVE
HGB BLD-MCNC: 4 G/DL (ref 12–16)
HGB BLD-MCNC: 6.4 G/DL (ref 12–16)
HGB BLD-MCNC: 7.5 G/DL (ref 12–16)
HGB BLD-MCNC: 7.9 G/DL (ref 12–16)
HGB BLD-MCNC: 8 G/DL (ref 12–16)
HGB BLD-MCNC: 8 G/DL (ref 12–16)
HGB BLD-MCNC: 8.1 G/DL (ref 12–16)
HGB BLD-MCNC: 8.2 G/DL (ref 12–16)
HGB BLD-MCNC: 8.2 G/DL (ref 12–16)
HGB BLD-MCNC: 8.5 G/DL (ref 12–16)
HGB BLD-MCNC: 8.5 G/DL (ref 12–16)
HGB BLD-MCNC: 8.6 G/DL (ref 12–16)
HGB BLD-MCNC: 8.6 G/DL (ref 12–16)
HGB BLD-MCNC: 8.7 G/DL (ref 12–16)
HGB BLD-MCNC: 9 G/DL (ref 12–16)
HGB RETIC QN AUTO: 33 PG/CELL (ref 29–35)
HOROWITZ INDEX BLDA+IHG-RTO: 222 MM[HG]
HOROWITZ INDEX BLDA+IHG-RTO: 243 MM[HG]
HOROWITZ INDEX BLDA+IHG-RTO: 271 MM[HG]
HOROWITZ INDEX BLDA+IHG-RTO: 317 MM[HG]
HOROWITZ INDEX BLDA+IHG-RTO: 323 MM[HG]
HOROWITZ INDEX BLDA+IHG-RTO: 347 MM[HG]
HOROWITZ INDEX BLDA+IHG-RTO: 377 MM[HG]
HOROWITZ INDEX BLDA+IHG-RTO: 420 MM[HG]
HOROWITZ INDEX BLDA+IHG-RTO: 65 MM[HG]
HYALINE CASTS #/AREA URNS LPF: ABNORMAL /LPF
HYPOCHROMIA BLD QL SMEAR: ABNORMAL
HYPOCHROMIA BLD QL SMEAR: ABNORMAL
IMM GRANULOCYTES # BLD AUTO: 0.13 K/UL (ref 0–0.11)
IMM GRANULOCYTES # BLD AUTO: 0.22 K/UL (ref 0–0.11)
IMM GRANULOCYTES # BLD AUTO: 0.23 K/UL (ref 0–0.11)
IMM GRANULOCYTES # BLD AUTO: 0.24 K/UL (ref 0–0.11)
IMM GRANULOCYTES # BLD AUTO: 0.36 K/UL (ref 0–0.11)
IMM GRANULOCYTES # BLD AUTO: 0.36 K/UL (ref 0–0.11)
IMM GRANULOCYTES NFR BLD AUTO: 0.7 % (ref 0–0.9)
IMM GRANULOCYTES NFR BLD AUTO: 1 % (ref 0–0.9)
IMM GRANULOCYTES NFR BLD AUTO: 1.1 % (ref 0–0.9)
IMM GRANULOCYTES NFR BLD AUTO: 1.2 % (ref 0–0.9)
IMM GRANULOCYTES NFR BLD AUTO: 1.5 % (ref 0–0.9)
IMM GRANULOCYTES NFR BLD AUTO: 2 % (ref 0–0.9)
IMM RETICS NFR: 21.4 % (ref 9.3–17.4)
INHALED O2 FLOW RATE: 4 L/MIN (ref 2–10)
INR PPP: 1.58 (ref 0.87–1.13)
INR PPP: 2.03 (ref 0.87–1.13)
INR PPP: 2.07 (ref 0.87–1.13)
INST. QUALIFIED PATIENT IIQPT: YES
INTERPRETATION SERPL IFE-IMP: ABNORMAL
IRON SATN MFR SERPL: 55 % (ref 15–55)
IRON SATN MFR SERPL: 85 % (ref 15–55)
IRON SERPL-MCNC: 118 UG/DL (ref 40–170)
IRON SERPL-MCNC: 58 UG/DL (ref 40–170)
KAPPA LC FREE SER-MCNC: 17.8 MG/DL (ref 0.33–1.94)
KAPPA LC FREE/LAMBDA FREE SER NEPH: 1.14 {RATIO} (ref 0.26–1.65)
KETONES UR STRIP.AUTO-MCNC: NEGATIVE MG/DL
LACTATE BLD-SCNC: 1.4 MMOL/L (ref 0.5–2)
LACTATE BLD-SCNC: 1.7 MMOL/L (ref 0.5–2)
LACTATE BLD-SCNC: 1.8 MMOL/L (ref 0.5–2)
LACTATE BLD-SCNC: 1.9 MMOL/L (ref 0.5–2)
LACTATE BLD-SCNC: 10 MMOL/L (ref 0.5–2)
LACTATE BLD-SCNC: 10.7 MMOL/L (ref 0.5–2)
LACTATE BLD-SCNC: 13.2 MMOL/L (ref 0.5–2)
LACTATE BLD-SCNC: 2.3 MMOL/L (ref 0.5–2)
LACTATE BLD-SCNC: 2.7 MMOL/L (ref 0.5–2)
LACTATE BLD-SCNC: 3.5 MMOL/L (ref 0.5–2)
LACTATE BLD-SCNC: 3.9 MMOL/L (ref 0.5–2)
LACTATE BLD-SCNC: 4 MMOL/L (ref 0.5–2)
LACTATE BLD-SCNC: 4.1 MMOL/L (ref 0.5–2)
LACTATE BLD-SCNC: 4.2 MMOL/L (ref 0.5–2)
LACTATE BLD-SCNC: 6.6 MMOL/L (ref 0.5–2)
LAMBDA LC FREE SERPL-MCNC: 15.6 MG/DL (ref 0.57–2.63)
LEUKOCYTE ESTERASE UR QL STRIP.AUTO: ABNORMAL
LG PLATELETS BLD QL SMEAR: NORMAL
LIPASE SERPL-CCNC: 360 U/L (ref 11–82)
LIPASE SERPL-CCNC: 374 U/L (ref 11–82)
LYMPHOCYTES # BLD AUTO: 0.28 K/UL (ref 1–4.8)
LYMPHOCYTES # BLD AUTO: 0.32 K/UL (ref 1–4.8)
LYMPHOCYTES # BLD AUTO: 0.46 K/UL (ref 1–4.8)
LYMPHOCYTES # BLD AUTO: 0.53 K/UL (ref 1–4.8)
LYMPHOCYTES # BLD AUTO: 0.54 K/UL (ref 1–4.8)
LYMPHOCYTES # BLD AUTO: 0.57 K/UL (ref 1–4.8)
LYMPHOCYTES # BLD AUTO: 0.59 K/UL (ref 1–4.8)
LYMPHOCYTES # BLD AUTO: 0.59 K/UL (ref 1–4.8)
LYMPHOCYTES # BLD AUTO: 0.82 K/UL (ref 1–4.8)
LYMPHOCYTES # BLD AUTO: 0.92 K/UL (ref 1–4.8)
LYMPHOCYTES NFR BLD: 1.2 % (ref 22–41)
LYMPHOCYTES NFR BLD: 1.5 % (ref 22–41)
LYMPHOCYTES NFR BLD: 2.5 % (ref 22–41)
LYMPHOCYTES NFR BLD: 2.6 % (ref 22–41)
LYMPHOCYTES NFR BLD: 2.8 % (ref 22–41)
LYMPHOCYTES NFR BLD: 3 % (ref 22–41)
LYMPHOCYTES NFR BLD: 3.4 % (ref 22–41)
LYMPHOCYTES NFR BLD: 3.5 % (ref 22–41)
LYMPHOCYTES NFR BLD: 3.5 % (ref 22–41)
LYMPHOCYTES NFR BLD: 6.1 % (ref 22–41)
MACROCYTES BLD QL SMEAR: ABNORMAL
MAGNESIUM SERPL-MCNC: 1.8 MG/DL (ref 1.5–2.5)
MAGNESIUM SERPL-MCNC: 1.9 MG/DL (ref 1.5–2.5)
MAGNESIUM SERPL-MCNC: 2.5 MG/DL (ref 1.5–2.5)
MAGNESIUM SERPL-MCNC: 2.7 MG/DL (ref 1.5–2.5)
MAGNESIUM SERPL-MCNC: 2.8 MG/DL (ref 1.5–2.5)
MAGNESIUM SERPL-MCNC: 2.8 MG/DL (ref 1.5–2.5)
MANUAL DIFF BLD: NORMAL
MCF BLD TEG: 79.5 MM (ref 50–70)
MCF BLD TEG: 83.2 MM (ref 50–70)
MCF P HPASE BLD TEG: 79 MM (ref 50–70)
MCH RBC QN AUTO: 32.1 PG (ref 27–33)
MCH RBC QN AUTO: 32.3 PG (ref 27–33)
MCH RBC QN AUTO: 32.6 PG (ref 27–33)
MCH RBC QN AUTO: 32.7 PG (ref 27–33)
MCH RBC QN AUTO: 33.2 PG (ref 27–33)
MCH RBC QN AUTO: 33.3 PG (ref 27–33)
MCH RBC QN AUTO: 33.3 PG (ref 27–33)
MCH RBC QN AUTO: 33.7 PG (ref 27–33)
MCH RBC QN AUTO: 33.9 PG (ref 27–33)
MCH RBC QN AUTO: 35.1 PG (ref 27–33)
MCHC RBC AUTO-ENTMCNC: 30.1 G/DL (ref 33.6–35)
MCHC RBC AUTO-ENTMCNC: 31 G/DL (ref 33.6–35)
MCHC RBC AUTO-ENTMCNC: 31 G/DL (ref 33.6–35)
MCHC RBC AUTO-ENTMCNC: 31.1 G/DL (ref 33.6–35)
MCHC RBC AUTO-ENTMCNC: 31.3 G/DL (ref 33.6–35)
MCHC RBC AUTO-ENTMCNC: 32 G/DL (ref 33.6–35)
MCHC RBC AUTO-ENTMCNC: 32 G/DL (ref 33.6–35)
MCHC RBC AUTO-ENTMCNC: 32.5 G/DL (ref 33.6–35)
MCHC RBC AUTO-ENTMCNC: 32.8 G/DL (ref 33.6–35)
MCHC RBC AUTO-ENTMCNC: 34.3 G/DL (ref 33.6–35)
MCV RBC AUTO: 103.7 FL (ref 81.4–97.8)
MCV RBC AUTO: 104.3 FL (ref 81.4–97.8)
MCV RBC AUTO: 104.5 FL (ref 81.4–97.8)
MCV RBC AUTO: 104.9 FL (ref 81.4–97.8)
MCV RBC AUTO: 105.8 FL (ref 81.4–97.8)
MCV RBC AUTO: 106.7 FL (ref 81.4–97.8)
MCV RBC AUTO: 106.9 FL (ref 81.4–97.8)
MCV RBC AUTO: 116.7 FL (ref 81.4–97.8)
MCV RBC AUTO: 93 FL (ref 81.4–97.8)
MCV RBC AUTO: 99.6 FL (ref 81.4–97.8)
METHADONE UR QL SCN: NEGATIVE
MICRO URNS: ABNORMAL
MICROCYTES BLD QL SMEAR: ABNORMAL
MICROCYTES BLD QL SMEAR: ABNORMAL
MONOCLON BAND OBS SERPL: ABNORMAL
MONOCYTES # BLD AUTO: 0 K/UL (ref 0–0.85)
MONOCYTES # BLD AUTO: 0.26 K/UL (ref 0–0.85)
MONOCYTES # BLD AUTO: 0.35 K/UL (ref 0–0.85)
MONOCYTES # BLD AUTO: 0.42 K/UL (ref 0–0.85)
MONOCYTES # BLD AUTO: 0.64 K/UL (ref 0–0.85)
MONOCYTES # BLD AUTO: 0.68 K/UL (ref 0–0.85)
MONOCYTES # BLD AUTO: 0.81 K/UL (ref 0–0.85)
MONOCYTES # BLD AUTO: 0.82 K/UL (ref 0–0.85)
MONOCYTES # BLD AUTO: 1.01 K/UL (ref 0–0.85)
MONOCYTES # BLD AUTO: 1.38 K/UL (ref 0–0.85)
MONOCYTES NFR BLD AUTO: 0 % (ref 0–13.4)
MONOCYTES NFR BLD AUTO: 1.7 % (ref 0–13.4)
MONOCYTES NFR BLD AUTO: 2.6 % (ref 0–13.4)
MONOCYTES NFR BLD AUTO: 2.6 % (ref 0–13.4)
MONOCYTES NFR BLD AUTO: 3 % (ref 0–13.4)
MONOCYTES NFR BLD AUTO: 3.3 % (ref 0–13.4)
MONOCYTES NFR BLD AUTO: 3.4 % (ref 0–13.4)
MONOCYTES NFR BLD AUTO: 4 % (ref 0–13.4)
MONOCYTES NFR BLD AUTO: 5.3 % (ref 0–13.4)
MONOCYTES NFR BLD AUTO: 7.8 % (ref 0–13.4)
MORPHOLOGY BLD-IMP: NORMAL
MRSA DNA SPEC QL NAA+PROBE: ABNORMAL
NEUTROPHILS # BLD AUTO: 12.23 K/UL (ref 2–7.15)
NEUTROPHILS # BLD AUTO: 14.6 K/UL (ref 2–7.15)
NEUTROPHILS # BLD AUTO: 15.07 K/UL (ref 2–7.15)
NEUTROPHILS # BLD AUTO: 15.48 K/UL (ref 2–7.15)
NEUTROPHILS # BLD AUTO: 17.03 K/UL (ref 2–7.15)
NEUTROPHILS # BLD AUTO: 17.86 K/UL (ref 2–7.15)
NEUTROPHILS # BLD AUTO: 19.17 K/UL (ref 2–7.15)
NEUTROPHILS # BLD AUTO: 21.62 K/UL (ref 2–7.15)
NEUTROPHILS # BLD AUTO: 22.13 K/UL (ref 2–7.15)
NEUTROPHILS # BLD AUTO: 26.08 K/UL (ref 2–7.15)
NEUTROPHILS NFR BLD: 86.9 % (ref 44–72)
NEUTROPHILS NFR BLD: 87 % (ref 44–72)
NEUTROPHILS NFR BLD: 89.8 % (ref 44–72)
NEUTROPHILS NFR BLD: 91.8 % (ref 44–72)
NEUTROPHILS NFR BLD: 92.2 % (ref 44–72)
NEUTROPHILS NFR BLD: 92.9 % (ref 44–72)
NEUTROPHILS NFR BLD: 93 % (ref 44–72)
NEUTROPHILS NFR BLD: 94 % (ref 44–72)
NEUTROPHILS NFR BLD: 94.8 % (ref 44–72)
NEUTROPHILS NFR BLD: 96.6 % (ref 44–72)
NEUTS BAND NFR BLD MANUAL: 4.4 % (ref 0–10)
NITRITE UR QL STRIP.AUTO: POSITIVE
NRBC # BLD AUTO: 0 K/UL
NRBC # BLD AUTO: 0.02 K/UL
NRBC # BLD AUTO: 0.03 K/UL
NRBC # BLD AUTO: 0.04 K/UL
NRBC # BLD AUTO: 0.04 K/UL
NRBC # BLD AUTO: 0.05 K/UL
NRBC # BLD AUTO: 0.05 K/UL
NRBC # BLD AUTO: 0.08 K/UL
NRBC # BLD AUTO: 0.22 K/UL
NRBC # BLD AUTO: 0.56 K/UL
NRBC BLD-RTO: 0 /100 WBC
NRBC BLD-RTO: 0.1 /100 WBC
NRBC BLD-RTO: 0.2 /100 WBC
NRBC BLD-RTO: 0.3 /100 WBC
NRBC BLD-RTO: 0.4 /100 WBC
NRBC BLD-RTO: 1.1 /100 WBC
NRBC BLD-RTO: 2.3 /100 WBC
NUCLEAR IGG SER QL IA: NORMAL
O2/TOTAL GAS SETTING VFR VENT: 100 %
O2/TOTAL GAS SETTING VFR VENT: 15 %
O2/TOTAL GAS SETTING VFR VENT: 28 %
O2/TOTAL GAS SETTING VFR VENT: 30 %
O2/TOTAL GAS SETTING VFR VENT: 50 %
O2/TOTAL GAS SETTING VFR VENT: 80 %
OPIATES UR QL SCN: NEGATIVE
OVALOCYTES BLD QL SMEAR: NORMAL
OXYCODONE UR QL SCN: NEGATIVE
PA AA BLD-ACNC: 0 %
PA AA BLD-ACNC: 8 %
PA ADP BLD-ACNC: 1.9 %
PA ADP BLD-ACNC: 13.6 %
PATHOLOGY STUDY: ABNORMAL
PCO2 BLDA: 16.5 MMHG (ref 26–37)
PCO2 BLDA: 22 MMHG (ref 26–37)
PCO2 BLDA: 24.2 MMHG (ref 26–37)
PCO2 BLDA: 28.4 MMHG (ref 26–37)
PCO2 BLDA: 34 MMHG (ref 26–37)
PCO2 BLDA: 37.5 MMHG (ref 26–37)
PCO2 BLDA: 37.7 MMHG (ref 26–37)
PCO2 BLDA: 39.2 MMHG (ref 26–37)
PCO2 BLDA: 41.6 MMHG (ref 26–37)
PCO2 BLDA: 44.8 MMHG (ref 26–37)
PCO2 BLDV: 30.5 MMHG (ref 41–51)
PCO2 TEMP ADJ BLDA: 21.2 MMHG (ref 26–37)
PCO2 TEMP ADJ BLDA: 24.1 MMHG (ref 26–37)
PCO2 TEMP ADJ BLDA: 28.5 MMHG (ref 26–37)
PCO2 TEMP ADJ BLDA: 34.9 MMHG (ref 26–37)
PCO2 TEMP ADJ BLDA: 37 MMHG (ref 26–37)
PCO2 TEMP ADJ BLDA: 37 MMHG (ref 26–37)
PCO2 TEMP ADJ BLDA: 37.6 MMHG (ref 26–37)
PCO2 TEMP ADJ BLDA: 39.2 MMHG (ref 26–37)
PCO2 TEMP ADJ BLDA: 44.1 MMHG (ref 26–37)
PCO2 TEMP ADJ BLDV: 25.8 MMHG (ref 41–51)
PCP UR QL SCN: NEGATIVE
PH BLDA: 7.24 [PH] (ref 7.4–7.5)
PH BLDA: 7.44 [PH] (ref 7.4–7.5)
PH BLDA: 7.44 [PH] (ref 7.4–7.5)
PH BLDA: 7.45 [PH] (ref 7.4–7.5)
PH BLDA: 7.45 [PH] (ref 7.4–7.5)
PH BLDA: 7.5 [PH] (ref 7.4–7.5)
PH BLDA: 7.51 [PH] (ref 7.4–7.5)
PH BLDA: 7.51 [PH] (ref 7.4–7.5)
PH BLDA: 7.52 [PH] (ref 7.4–7.5)
PH BLDA: 7.6 [PH] (ref 7.4–7.5)
PH BLDV: 7.23 [PH] (ref 7.31–7.45)
PH TEMP ADJ BLDA: 7.44 [PH] (ref 7.4–7.5)
PH TEMP ADJ BLDA: 7.45 [PH] (ref 7.4–7.5)
PH TEMP ADJ BLDA: 7.45 [PH] (ref 7.4–7.5)
PH TEMP ADJ BLDA: 7.47 [PH] (ref 7.4–7.5)
PH TEMP ADJ BLDA: 7.49 [PH] (ref 7.4–7.5)
PH TEMP ADJ BLDA: 7.52 [PH] (ref 7.4–7.5)
PH TEMP ADJ BLDA: 7.53 [PH] (ref 7.4–7.5)
PH TEMP ADJ BLDA: 7.53 [PH] (ref 7.4–7.5)
PH TEMP ADJ BLDA: 7.6 [PH] (ref 7.4–7.5)
PH TEMP ADJ BLDV: 7.28 [PH] (ref 7.31–7.45)
PH UR STRIP.AUTO: 7.5 [PH] (ref 5–8)
PHOSPHATE SERPL-MCNC: 1 MG/DL (ref 2.5–4.5)
PHOSPHATE SERPL-MCNC: 15.4 MG/DL (ref 2.5–4.5)
PHOSPHATE SERPL-MCNC: 3.7 MG/DL (ref 2.5–4.5)
PHOSPHATE SERPL-MCNC: 3.7 MG/DL (ref 2.5–4.5)
PHOSPHATE SERPL-MCNC: 4.6 MG/DL (ref 2.5–4.5)
PHOSPHATE SERPL-MCNC: 6.5 MG/DL (ref 2.5–4.5)
PHOSPHATE SERPL-MCNC: 7.9 MG/DL (ref 2.5–4.5)
PHOSPHATE SERPL-MCNC: <1 MG/DL (ref 2.5–4.5)
PHOSPHATE SERPL-MCNC: <1 MG/DL (ref 2.5–4.5)
PLATELET # BLD AUTO: 101 K/UL (ref 164–446)
PLATELET # BLD AUTO: 115 K/UL (ref 164–446)
PLATELET # BLD AUTO: 127 K/UL (ref 164–446)
PLATELET # BLD AUTO: 178 K/UL (ref 164–446)
PLATELET # BLD AUTO: 215 K/UL (ref 164–446)
PLATELET # BLD AUTO: 86 K/UL (ref 164–446)
PLATELET # BLD AUTO: 94 K/UL (ref 164–446)
PLATELET # BLD AUTO: 94 K/UL (ref 164–446)
PLATELET # BLD AUTO: 95 K/UL (ref 164–446)
PLATELET # BLD AUTO: 99 K/UL (ref 164–446)
PLATELET BLD QL SMEAR: NORMAL
PMV BLD AUTO: 11.9 FL (ref 9–12.9)
PMV BLD AUTO: 11.9 FL (ref 9–12.9)
PMV BLD AUTO: 12 FL (ref 9–12.9)
PMV BLD AUTO: 12.1 FL (ref 9–12.9)
PMV BLD AUTO: 12.3 FL (ref 9–12.9)
PMV BLD AUTO: 12.5 FL (ref 9–12.9)
PMV BLD AUTO: 13.2 FL (ref 9–12.9)
PMV BLD AUTO: 13.3 FL (ref 9–12.9)
PMV BLD AUTO: 13.6 FL (ref 9–12.9)
PMV BLD AUTO: 13.7 FL (ref 9–12.9)
PO2 BLDA: 104 MMHG (ref 64–87)
PO2 BLDA: 111 MMHG (ref 64–87)
PO2 BLDA: 113 MMHG (ref 64–87)
PO2 BLDA: 198.1 MMHG (ref 64–87)
PO2 BLDA: 317 MMHG (ref 64–87)
PO2 BLDA: 52 MMHG (ref 64–87)
PO2 BLDA: 63 MMHG (ref 64–87)
PO2 BLDA: 73 MMHG (ref 64–87)
PO2 BLDA: 76 MMHG (ref 64–87)
PO2 BLDA: 97 MMHG (ref 64–87)
PO2 BLDV: 22 MMHG (ref 25–40)
PO2 TEMP ADJ BLDA: 102 MMHG (ref 64–87)
PO2 TEMP ADJ BLDA: 113 MMHG (ref 64–87)
PO2 TEMP ADJ BLDA: 115 MMHG (ref 64–87)
PO2 TEMP ADJ BLDA: 315 MMHG (ref 64–87)
PO2 TEMP ADJ BLDA: 49 MMHG (ref 64–87)
PO2 TEMP ADJ BLDA: 57 MMHG (ref 64–87)
PO2 TEMP ADJ BLDA: 69 MMHG (ref 64–87)
PO2 TEMP ADJ BLDA: 75 MMHG (ref 64–87)
PO2 TEMP ADJ BLDA: 94 MMHG (ref 64–87)
PO2 TEMP ADJ BLDV: 17 MMHG (ref 25–40)
POIKILOCYTOSIS BLD QL SMEAR: NORMAL
POIKILOCYTOSIS BLD QL SMEAR: NORMAL
POLYCHROMASIA BLD QL SMEAR: NORMAL
POTASSIUM BLD-SCNC: 3.3 MMOL/L (ref 3.6–5.5)
POTASSIUM SERPL-SCNC: 3 MMOL/L (ref 3.6–5.5)
POTASSIUM SERPL-SCNC: 3.2 MMOL/L (ref 3.6–5.5)
POTASSIUM SERPL-SCNC: 3.4 MMOL/L (ref 3.6–5.5)
POTASSIUM SERPL-SCNC: 3.5 MMOL/L (ref 3.6–5.5)
POTASSIUM SERPL-SCNC: 3.5 MMOL/L (ref 3.6–5.5)
POTASSIUM SERPL-SCNC: 3.6 MMOL/L (ref 3.6–5.5)
POTASSIUM SERPL-SCNC: 3.6 MMOL/L (ref 3.6–5.5)
POTASSIUM SERPL-SCNC: 3.7 MMOL/L (ref 3.6–5.5)
POTASSIUM SERPL-SCNC: 3.7 MMOL/L (ref 3.6–5.5)
POTASSIUM SERPL-SCNC: 3.8 MMOL/L (ref 3.6–5.5)
POTASSIUM SERPL-SCNC: 3.9 MMOL/L (ref 3.6–5.5)
POTASSIUM SERPL-SCNC: 4 MMOL/L (ref 3.6–5.5)
POTASSIUM SERPL-SCNC: 4.3 MMOL/L (ref 3.6–5.5)
POTASSIUM SERPL-SCNC: 6.1 MMOL/L (ref 3.6–5.5)
POTASSIUM SERPL-SCNC: 6.9 MMOL/L (ref 3.6–5.5)
PREALB SERPL-MCNC: 5 MG/DL (ref 18–38)
PREALB SERPL-MCNC: 6 MG/DL (ref 18–38)
PROCALCITONIN SERPL-MCNC: 13.59 NG/ML
PRODUCT TYPE UPROD: NORMAL
PROPOXYPH UR QL SCN: NEGATIVE
PROT SERPL-MCNC: 5.8 G/DL (ref 6–8.2)
PROT SERPL-MCNC: 5.9 G/DL (ref 6–8.2)
PROT SERPL-MCNC: 6 G/DL (ref 6–8.2)
PROT SERPL-MCNC: 6.1 G/DL (ref 6.3–8.2)
PROT SERPL-MCNC: 6.1 G/DL (ref 6–8.2)
PROT SERPL-MCNC: 6.3 G/DL (ref 6–8.2)
PROT SERPL-MCNC: 6.4 G/DL (ref 6–8.2)
PROT SERPL-MCNC: 6.5 G/DL (ref 6–8.2)
PROT SERPL-MCNC: 6.7 G/DL (ref 6–8.2)
PROT UR QL STRIP: 300 MG/DL
PROTHROMBIN TIME: 19.4 SEC (ref 12–14.6)
PROTHROMBIN TIME: 23.6 SEC (ref 12–14.6)
PROTHROMBIN TIME: 24 SEC (ref 12–14.6)
PTH-INTACT SERPL-MCNC: 424.6 PG/ML (ref 14–72)
RBC # BLD AUTO: 1.14 M/UL (ref 4.2–5.4)
RBC # BLD AUTO: 2.46 M/UL (ref 4.2–5.4)
RBC # BLD AUTO: 2.55 M/UL (ref 4.2–5.4)
RBC # BLD AUTO: 2.57 M/UL (ref 4.2–5.4)
RBC # BLD AUTO: 2.58 M/UL (ref 4.2–5.4)
RBC # BLD AUTO: 2.6 M/UL (ref 4.2–5.4)
RBC # BLD AUTO: 2.62 M/UL (ref 4.2–5.4)
RBC # BLD AUTO: 2.64 M/UL (ref 4.2–5.4)
RBC # BLD AUTO: 2.67 M/UL (ref 4.2–5.4)
RBC # BLD AUTO: 2.69 M/UL (ref 4.2–5.4)
RBC # URNS HPF: ABNORMAL /HPF
RBC BLD AUTO: PRESENT
RBC UR QL AUTO: ABNORMAL
RETICS # AUTO: 0.1 M/UL (ref 0.04–0.06)
RETICS/RBC NFR: 9.4 % (ref 0.8–2.1)
RH BLD: NORMAL
ROULEAUX BLD QL SMEAR: SLIGHT
SAO2 % BLDA: 100 % (ref 93–99)
SAO2 % BLDA: 90 % (ref 93–99)
SAO2 % BLDA: 92 % (ref 93–99)
SAO2 % BLDA: 96 % (ref 93–99)
SAO2 % BLDA: 97 % (ref 93–99)
SAO2 % BLDA: 98 % (ref 93–99)
SAO2 % BLDA: 98 % (ref 93–99)
SAO2 % BLDA: 98.8 % (ref 93–99)
SAO2 % BLDA: 99 % (ref 93–99)
SAO2 % BLDA: 99 % (ref 93–99)
SAO2 % BLDV: 28 %
SIGNIFICANT IND 70042: ABNORMAL
SIGNIFICANT IND 70042: ABNORMAL
SIGNIFICANT IND 70042: NORMAL
SITE SITE: ABNORMAL
SITE SITE: ABNORMAL
SITE SITE: NORMAL
SODIUM BLD-SCNC: 129 MMOL/L (ref 135–145)
SODIUM SERPL-SCNC: 124 MMOL/L (ref 135–145)
SODIUM SERPL-SCNC: 127 MMOL/L (ref 135–145)
SODIUM SERPL-SCNC: 129 MMOL/L (ref 135–145)
SODIUM SERPL-SCNC: 129 MMOL/L (ref 135–145)
SODIUM SERPL-SCNC: 130 MMOL/L (ref 135–145)
SODIUM SERPL-SCNC: 132 MMOL/L (ref 135–145)
SODIUM SERPL-SCNC: 132 MMOL/L (ref 135–145)
SODIUM SERPL-SCNC: 133 MMOL/L (ref 135–145)
SODIUM SERPL-SCNC: 136 MMOL/L (ref 135–145)
SODIUM SERPL-SCNC: 137 MMOL/L (ref 135–145)
SODIUM SERPL-SCNC: 139 MMOL/L (ref 135–145)
SODIUM SERPL-SCNC: 142 MMOL/L (ref 135–145)
SODIUM SERPL-SCNC: 144 MMOL/L (ref 135–145)
SOURCE SOURCE: ABNORMAL
SOURCE SOURCE: ABNORMAL
SOURCE SOURCE: NORMAL
SP GR UR STRIP.AUTO: 1.02
SPECIMEN DRAWN FROM PATIENT: ABNORMAL
STOMATOCYTES BLD QL SMEAR: NORMAL
TARGETS BLD QL SMEAR: NORMAL
TARGETS BLD QL SMEAR: NORMAL
TEG ALGORITHM TGALG: ABNORMAL
TEG ALGORITHM TGALG: ABNORMAL
TIBC SERPL-MCNC: 105 UG/DL (ref 250–450)
TIBC SERPL-MCNC: 139 UG/DL (ref 250–450)
TOXIC GRANULES BLD QL SMEAR: SLIGHT
TRANSFERRIN SERPL-MCNC: 76 MG/DL (ref 200–370)
TROPONIN T SERPL-MCNC: 35 NG/L (ref 6–19)
TROPONIN T SERPL-MCNC: 38 NG/L (ref 6–19)
TROPONIN T SERPL-MCNC: 47 NG/L (ref 6–19)
TROPONIN T SERPL-MCNC: 75 NG/L (ref 6–19)
TROPONIN T SERPL-MCNC: 76 NG/L (ref 6–19)
TSH SERPL DL<=0.005 MIU/L-ACNC: 3.6 UIU/ML (ref 0.38–5.33)
UNIT STATUS USTAT: NORMAL
UROBILINOGEN UR STRIP.AUTO-MCNC: 1 MG/DL
WBC # BLD AUTO: 13.4 K/UL (ref 4.8–10.8)
WBC # BLD AUTO: 15.4 K/UL (ref 4.8–10.8)
WBC # BLD AUTO: 16.2 K/UL (ref 4.8–10.8)
WBC # BLD AUTO: 17.8 K/UL (ref 4.8–10.8)
WBC # BLD AUTO: 19 K/UL (ref 4.8–10.8)
WBC # BLD AUTO: 19.5 K/UL (ref 4.8–10.8)
WBC # BLD AUTO: 20.7 K/UL (ref 4.8–10.8)
WBC # BLD AUTO: 23 K/UL (ref 4.8–10.8)
WBC # BLD AUTO: 24 K/UL (ref 4.8–10.8)
WBC # BLD AUTO: 27 K/UL (ref 4.8–10.8)
WBC #/AREA URNS HPF: ABNORMAL /HPF

## 2020-01-01 PROCEDURE — 80053 COMPREHEN METABOLIC PANEL: CPT

## 2020-01-01 PROCEDURE — 86706 HEP B SURFACE ANTIBODY: CPT

## 2020-01-01 PROCEDURE — 99292 CRITICAL CARE ADDL 30 MIN: CPT | Performed by: INTERNAL MEDICINE

## 2020-01-01 PROCEDURE — 99231 SBSQ HOSP IP/OBS SF/LOW 25: CPT | Performed by: HOSPITALIST

## 2020-01-01 PROCEDURE — 85007 BL SMEAR W/DIFF WBC COUNT: CPT

## 2020-01-01 PROCEDURE — 700111 HCHG RX REV CODE 636 W/ 250 OVERRIDE (IP): Performed by: INTERNAL MEDICINE

## 2020-01-01 PROCEDURE — 90935 HEMODIALYSIS ONE EVALUATION: CPT

## 2020-01-01 PROCEDURE — 84466 ASSAY OF TRANSFERRIN: CPT

## 2020-01-01 PROCEDURE — 700105 HCHG RX REV CODE 258: Performed by: INTERNAL MEDICINE

## 2020-01-01 PROCEDURE — A9270 NON-COVERED ITEM OR SERVICE: HCPCS | Performed by: INTERNAL MEDICINE

## 2020-01-01 PROCEDURE — 700102 HCHG RX REV CODE 250 W/ 637 OVERRIDE(OP): Performed by: INTERNAL MEDICINE

## 2020-01-01 PROCEDURE — 700105 HCHG RX REV CODE 258: Performed by: EMERGENCY MEDICINE

## 2020-01-01 PROCEDURE — 86038 ANTINUCLEAR ANTIBODIES: CPT

## 2020-01-01 PROCEDURE — 83735 ASSAY OF MAGNESIUM: CPT

## 2020-01-01 PROCEDURE — 94002 VENT MGMT INPAT INIT DAY: CPT

## 2020-01-01 PROCEDURE — 99291 CRITICAL CARE FIRST HOUR: CPT | Mod: 25 | Performed by: INTERNAL MEDICINE

## 2020-01-01 PROCEDURE — 700101 HCHG RX REV CODE 250: Performed by: INTERNAL MEDICINE

## 2020-01-01 PROCEDURE — 84100 ASSAY OF PHOSPHORUS: CPT

## 2020-01-01 PROCEDURE — 94640 AIRWAY INHALATION TREATMENT: CPT

## 2020-01-01 PROCEDURE — 81001 URINALYSIS AUTO W/SCOPE: CPT

## 2020-01-01 PROCEDURE — 37799 UNLISTED PX VASCULAR SURGERY: CPT

## 2020-01-01 PROCEDURE — 700102 HCHG RX REV CODE 250 W/ 637 OVERRIDE(OP): Performed by: HOSPITALIST

## 2020-01-01 PROCEDURE — 36620 INSERTION CATHETER ARTERY: CPT | Performed by: INTERNAL MEDICINE

## 2020-01-01 PROCEDURE — A9270 NON-COVERED ITEM OR SERVICE: HCPCS | Performed by: HOSPITALIST

## 2020-01-01 PROCEDURE — 5A1D70Z PERFORMANCE OF URINARY FILTRATION, INTERMITTENT, LESS THAN 6 HOURS PER DAY: ICD-10-PCS | Performed by: INTERNAL MEDICINE

## 2020-01-01 PROCEDURE — 30233K1 TRANSFUSION OF NONAUTOLOGOUS FROZEN PLASMA INTO PERIPHERAL VEIN, PERCUTANEOUS APPROACH: ICD-10-PCS | Performed by: HOSPITALIST

## 2020-01-01 PROCEDURE — 85730 THROMBOPLASTIN TIME PARTIAL: CPT

## 2020-01-01 PROCEDURE — 82803 BLOOD GASES ANY COMBINATION: CPT

## 2020-01-01 PROCEDURE — 36430 TRANSFUSION BLD/BLD COMPNT: CPT

## 2020-01-01 PROCEDURE — 99291 CRITICAL CARE FIRST HOUR: CPT | Performed by: INTERNAL MEDICINE

## 2020-01-01 PROCEDURE — 86255 FLUORESCENT ANTIBODY SCREEN: CPT

## 2020-01-01 PROCEDURE — 02HV33Z INSERTION OF INFUSION DEVICE INTO SUPERIOR VENA CAVA, PERCUTANEOUS APPROACH: ICD-10-PCS | Performed by: INTERNAL MEDICINE

## 2020-01-01 PROCEDURE — 83883 ASSAY NEPHELOMETRY NOT SPEC: CPT | Mod: 91

## 2020-01-01 PROCEDURE — 82550 ASSAY OF CK (CPK): CPT

## 2020-01-01 PROCEDURE — 83540 ASSAY OF IRON: CPT

## 2020-01-01 PROCEDURE — 770022 HCHG ROOM/CARE - ICU (200)

## 2020-01-01 PROCEDURE — 84484 ASSAY OF TROPONIN QUANT: CPT

## 2020-01-01 PROCEDURE — 87040 BLOOD CULTURE FOR BACTERIA: CPT

## 2020-01-01 PROCEDURE — 36620 INSERTION CATHETER ARTERY: CPT

## 2020-01-01 PROCEDURE — 700111 HCHG RX REV CODE 636 W/ 250 OVERRIDE (IP)

## 2020-01-01 PROCEDURE — 74176 CT ABD & PELVIS W/O CONTRAST: CPT

## 2020-01-01 PROCEDURE — 83550 IRON BINDING TEST: CPT

## 2020-01-01 PROCEDURE — 83605 ASSAY OF LACTIC ACID: CPT

## 2020-01-01 PROCEDURE — 85027 COMPLETE CBC AUTOMATED: CPT

## 2020-01-01 PROCEDURE — 83690 ASSAY OF LIPASE: CPT

## 2020-01-01 PROCEDURE — 71045 X-RAY EXAM CHEST 1 VIEW: CPT

## 2020-01-01 PROCEDURE — C9113 INJ PANTOPRAZOLE SODIUM, VIA: HCPCS | Performed by: HOSPITALIST

## 2020-01-01 PROCEDURE — B548ZZA ULTRASONOGRAPHY OF SUPERIOR VENA CAVA, GUIDANCE: ICD-10-PCS | Performed by: INTERNAL MEDICINE

## 2020-01-01 PROCEDURE — 92526 ORAL FUNCTION THERAPY: CPT

## 2020-01-01 PROCEDURE — 94150 VITAL CAPACITY TEST: CPT

## 2020-01-01 PROCEDURE — 306397 CUSHION, WAFFLE ADULT 19X19: Performed by: INTERNAL MEDICINE

## 2020-01-01 PROCEDURE — 700111 HCHG RX REV CODE 636 W/ 250 OVERRIDE (IP): Performed by: HOSPITALIST

## 2020-01-01 PROCEDURE — 84443 ASSAY THYROID STIM HORMONE: CPT

## 2020-01-01 PROCEDURE — 5A1955Z RESPIRATORY VENTILATION, GREATER THAN 96 CONSECUTIVE HOURS: ICD-10-PCS | Performed by: INTERNAL MEDICINE

## 2020-01-01 PROCEDURE — 80307 DRUG TEST PRSMV CHEM ANLYZR: CPT

## 2020-01-01 PROCEDURE — 85610 PROTHROMBIN TIME: CPT

## 2020-01-01 PROCEDURE — 82533 TOTAL CORTISOL: CPT

## 2020-01-01 PROCEDURE — 302307 BAG FECAL MANAGEMENT TEMPORARY COLLECTION WITH FILTER - SEAL SIGNAL FMS: Performed by: INTERNAL MEDICINE

## 2020-01-01 PROCEDURE — 82330 ASSAY OF CALCIUM: CPT

## 2020-01-01 PROCEDURE — 94003 VENT MGMT INPAT SUBQ DAY: CPT

## 2020-01-01 PROCEDURE — 99292 CRITICAL CARE ADDL 30 MIN: CPT | Mod: 25 | Performed by: INTERNAL MEDICINE

## 2020-01-01 PROCEDURE — 86900 BLOOD TYPING SEROLOGIC ABO: CPT

## 2020-01-01 PROCEDURE — 97162 PT EVAL MOD COMPLEX 30 MIN: CPT

## 2020-01-01 PROCEDURE — 36600 WITHDRAWAL OF ARTERIAL BLOOD: CPT

## 2020-01-01 PROCEDURE — 87086 URINE CULTURE/COLONY COUNT: CPT

## 2020-01-01 PROCEDURE — 36556 INSERT NON-TUNNEL CV CATH: CPT | Mod: RT | Performed by: INTERNAL MEDICINE

## 2020-01-01 PROCEDURE — 84295 ASSAY OF SERUM SODIUM: CPT

## 2020-01-01 PROCEDURE — 85018 HEMOGLOBIN: CPT

## 2020-01-01 PROCEDURE — 80048 BASIC METABOLIC PNL TOTAL CA: CPT | Mod: 91

## 2020-01-01 PROCEDURE — 0DJ08ZZ INSPECTION OF UPPER INTESTINAL TRACT, VIA NATURAL OR ARTIFICIAL OPENING ENDOSCOPIC: ICD-10-PCS | Performed by: INTERNAL MEDICINE

## 2020-01-01 PROCEDURE — 84134 ASSAY OF PREALBUMIN: CPT

## 2020-01-01 PROCEDURE — C9113 INJ PANTOPRAZOLE SODIUM, VIA: HCPCS | Performed by: INTERNAL MEDICINE

## 2020-01-01 PROCEDURE — 99152 MOD SED SAME PHYS/QHP 5/>YRS: CPT

## 2020-01-01 PROCEDURE — C1752 CATH,HEMODIALYSIS,SHORT-TERM: HCPCS

## 2020-01-01 PROCEDURE — 99231 SBSQ HOSP IP/OBS SF/LOW 25: CPT | Performed by: INTERNAL MEDICINE

## 2020-01-01 PROCEDURE — 160207 HCHG ENDO MINUTES - EA ADDL 1 MIN LEVEL 3: Performed by: INTERNAL MEDICINE

## 2020-01-01 PROCEDURE — 700105 HCHG RX REV CODE 258: Performed by: HOSPITALIST

## 2020-01-01 PROCEDURE — 82962 GLUCOSE BLOOD TEST: CPT

## 2020-01-01 PROCEDURE — 86140 C-REACTIVE PROTEIN: CPT

## 2020-01-01 PROCEDURE — 92610 EVALUATE SWALLOWING FUNCTION: CPT

## 2020-01-01 PROCEDURE — 700102 HCHG RX REV CODE 250 W/ 637 OVERRIDE(OP): Performed by: EMERGENCY MEDICINE

## 2020-01-01 PROCEDURE — 700101 HCHG RX REV CODE 250

## 2020-01-01 PROCEDURE — 85576 BLOOD PLATELET AGGREGATION: CPT

## 2020-01-01 PROCEDURE — 36556 INSERT NON-TUNNEL CV CATH: CPT

## 2020-01-01 PROCEDURE — 31500 INSERT EMERGENCY AIRWAY: CPT | Performed by: INTERNAL MEDICINE

## 2020-01-01 PROCEDURE — 99232 SBSQ HOSP IP/OBS MODERATE 35: CPT | Performed by: HOSPITALIST

## 2020-01-01 PROCEDURE — 82803 BLOOD GASES ANY COMBINATION: CPT | Mod: 91

## 2020-01-01 PROCEDURE — 87641 MR-STAPH DNA AMP PROBE: CPT

## 2020-01-01 PROCEDURE — 770004 HCHG ROOM/CARE - ONCOLOGY PRIVATE *

## 2020-01-01 PROCEDURE — 30233N1 TRANSFUSION OF NONAUTOLOGOUS RED BLOOD CELLS INTO PERIPHERAL VEIN, PERCUTANEOUS APPROACH: ICD-10-PCS | Performed by: HOSPITALIST

## 2020-01-01 PROCEDURE — 160202 HCHG ENDO MINUTES - 1ST 30 MINS LEVEL 3: Performed by: INTERNAL MEDICINE

## 2020-01-01 PROCEDURE — 700101 HCHG RX REV CODE 250: Performed by: HOSPITALIST

## 2020-01-01 PROCEDURE — 85046 RETICYTE/HGB CONCENTRATE: CPT

## 2020-01-01 PROCEDURE — 97166 OT EVAL MOD COMPLEX 45 MIN: CPT

## 2020-01-01 PROCEDURE — 76700 US EXAM ABDOM COMPLETE: CPT

## 2020-01-01 PROCEDURE — 85347 COAGULATION TIME ACTIVATED: CPT | Mod: 91

## 2020-01-01 PROCEDURE — 0BH18EZ INSERTION OF ENDOTRACHEAL AIRWAY INTO TRACHEA, VIA NATURAL OR ARTIFICIAL OPENING ENDOSCOPIC: ICD-10-PCS | Performed by: INTERNAL MEDICINE

## 2020-01-01 PROCEDURE — 87502 INFLUENZA DNA AMP PROBE: CPT

## 2020-01-01 PROCEDURE — 84132 ASSAY OF SERUM POTASSIUM: CPT

## 2020-01-01 PROCEDURE — C1751 CATH, INF, PER/CENT/MIDLINE: HCPCS

## 2020-01-01 PROCEDURE — C9113 INJ PANTOPRAZOLE SODIUM, VIA: HCPCS | Performed by: EMERGENCY MEDICINE

## 2020-01-01 PROCEDURE — 303105 HCHG CATHETER EXTRA

## 2020-01-01 PROCEDURE — 86901 BLOOD TYPING SEROLOGIC RH(D): CPT

## 2020-01-01 PROCEDURE — 96375 TX/PRO/DX INJ NEW DRUG ADDON: CPT

## 2020-01-01 PROCEDURE — 0DJ68ZZ INSPECTION OF STOMACH, VIA NATURAL OR ARTIFICIAL OPENING ENDOSCOPIC: ICD-10-PCS | Performed by: INTERNAL MEDICINE

## 2020-01-01 PROCEDURE — 03HY32Z INSERTION OF MONITORING DEVICE INTO UPPER ARTERY, PERCUTANEOUS APPROACH: ICD-10-PCS | Performed by: INTERNAL MEDICINE

## 2020-01-01 PROCEDURE — 84145 PROCALCITONIN (PCT): CPT

## 2020-01-01 PROCEDURE — 85652 RBC SED RATE AUTOMATED: CPT

## 2020-01-01 PROCEDURE — 306802 SYSTEM FECAL MANAGEMENT CATHETER KIT FLEXI - SEAL: Performed by: INTERNAL MEDICINE

## 2020-01-01 PROCEDURE — 85014 HEMATOCRIT: CPT

## 2020-01-01 PROCEDURE — P9016 RBC LEUKOCYTES REDUCED: HCPCS | Mod: 91

## 2020-01-01 PROCEDURE — HZ2ZZZZ DETOXIFICATION SERVICES FOR SUBSTANCE ABUSE TREATMENT: ICD-10-PCS | Performed by: INTERNAL MEDICINE

## 2020-01-01 PROCEDURE — 87186 SC STD MICRODIL/AGAR DIL: CPT

## 2020-01-01 PROCEDURE — 82140 ASSAY OF AMMONIA: CPT | Mod: 91

## 2020-01-01 PROCEDURE — 31500 INSERT EMERGENCY AIRWAY: CPT

## 2020-01-01 PROCEDURE — 85025 COMPLETE CBC W/AUTO DIFF WBC: CPT

## 2020-01-01 PROCEDURE — 700101 HCHG RX REV CODE 250: Performed by: EMERGENCY MEDICINE

## 2020-01-01 PROCEDURE — 500066 HCHG BITE BLOCK, ECT: Performed by: INTERNAL MEDICINE

## 2020-01-01 PROCEDURE — 04JY3ZZ INSPECTION OF LOWER ARTERY, PERCUTANEOUS APPROACH: ICD-10-PCS | Performed by: INTERNAL MEDICINE

## 2020-01-01 PROCEDURE — 96368 THER/DIAG CONCURRENT INF: CPT

## 2020-01-01 PROCEDURE — 86923 COMPATIBILITY TEST ELECTRIC: CPT | Mod: 91

## 2020-01-01 PROCEDURE — 85384 FIBRINOGEN ACTIVITY: CPT

## 2020-01-01 PROCEDURE — 80048 BASIC METABOLIC PNL TOTAL CA: CPT

## 2020-01-01 PROCEDURE — 80074 ACUTE HEPATITIS PANEL: CPT

## 2020-01-01 PROCEDURE — 84155 ASSAY OF PROTEIN SERUM: CPT

## 2020-01-01 PROCEDURE — 302136 NUTRITION PUMP: Performed by: INTERNAL MEDICINE

## 2020-01-01 PROCEDURE — 86850 RBC ANTIBODY SCREEN: CPT

## 2020-01-01 PROCEDURE — 76775 US EXAM ABDO BACK WALL LIM: CPT

## 2020-01-01 PROCEDURE — 82962 GLUCOSE BLOOD TEST: CPT | Mod: 91

## 2020-01-01 PROCEDURE — 86160 COMPLEMENT ANTIGEN: CPT

## 2020-01-01 PROCEDURE — 99156 MOD SED OTH PHYS/QHP 5/>YRS: CPT | Performed by: INTERNAL MEDICINE

## 2020-01-01 PROCEDURE — P9017 PLASMA 1 DONOR FRZ W/IN 8 HR: HCPCS

## 2020-01-01 PROCEDURE — 160048 HCHG OR STATISTICAL LEVEL 1-5: Performed by: INTERNAL MEDICINE

## 2020-01-01 PROCEDURE — 84165 PROTEIN E-PHORESIS SERUM: CPT

## 2020-01-01 PROCEDURE — 87077 CULTURE AEROBIC IDENTIFY: CPT

## 2020-01-01 PROCEDURE — 83970 ASSAY OF PARATHORMONE: CPT

## 2020-01-01 PROCEDURE — 96365 THER/PROPH/DIAG IV INF INIT: CPT

## 2020-01-01 PROCEDURE — 83605 ASSAY OF LACTIC ACID: CPT | Mod: 91

## 2020-01-01 PROCEDURE — 99291 CRITICAL CARE FIRST HOUR: CPT

## 2020-01-01 PROCEDURE — 96367 TX/PROPH/DG ADDL SEQ IV INF: CPT

## 2020-01-01 PROCEDURE — 74181 MRI ABDOMEN W/O CONTRAST: CPT

## 2020-01-01 PROCEDURE — 96366 THER/PROPH/DIAG IV INF ADDON: CPT

## 2020-01-01 PROCEDURE — 700111 HCHG RX REV CODE 636 W/ 250 OVERRIDE (IP): Performed by: EMERGENCY MEDICINE

## 2020-01-01 PROCEDURE — 70450 CT HEAD/BRAIN W/O DYE: CPT

## 2020-01-01 PROCEDURE — 51702 INSERT TEMP BLADDER CATH: CPT

## 2020-01-01 PROCEDURE — 99291 CRITICAL CARE FIRST HOUR: CPT | Performed by: HOSPITALIST

## 2020-01-01 PROCEDURE — 82306 VITAMIN D 25 HYDROXY: CPT

## 2020-01-01 PROCEDURE — 302306 BAG IRRIGATION 1000ML COLLECT: Performed by: INTERNAL MEDICINE

## 2020-01-01 PROCEDURE — 85018 HEMOGLOBIN: CPT | Mod: 91

## 2020-01-01 PROCEDURE — 84484 ASSAY OF TROPONIN QUANT: CPT | Mod: 91

## 2020-01-01 PROCEDURE — 82728 ASSAY OF FERRITIN: CPT

## 2020-01-01 RX ORDER — DEXTROSE MONOHYDRATE 25 G/50ML
50 INJECTION, SOLUTION INTRAVENOUS ONCE
Status: COMPLETED | OUTPATIENT
Start: 2020-01-01 | End: 2020-01-01

## 2020-01-01 RX ORDER — OCTREOTIDE ACETATE 100 UG/ML
50 INJECTION, SOLUTION INTRAVENOUS; SUBCUTANEOUS ONCE
Status: COMPLETED | OUTPATIENT
Start: 2020-01-01 | End: 2020-01-01

## 2020-01-01 RX ORDER — LORAZEPAM 2 MG/ML
2 INJECTION INTRAMUSCULAR
Status: DISCONTINUED | OUTPATIENT
Start: 2020-01-01 | End: 2020-01-01

## 2020-01-01 RX ORDER — THIAMINE MONONITRATE (VIT B1) 100 MG
100 TABLET ORAL DAILY
Status: DISPENSED | OUTPATIENT
Start: 2020-01-01 | End: 2020-01-01

## 2020-01-01 RX ORDER — LORAZEPAM 2 MG/ML
4 INJECTION INTRAMUSCULAR
Status: DISCONTINUED | OUTPATIENT
Start: 2020-01-01 | End: 2020-01-01

## 2020-01-01 RX ORDER — IPRATROPIUM BROMIDE AND ALBUTEROL SULFATE 2.5; .5 MG/3ML; MG/3ML
3 SOLUTION RESPIRATORY (INHALATION)
Status: DISCONTINUED | OUTPATIENT
Start: 2020-01-01 | End: 2020-01-01

## 2020-01-01 RX ORDER — MIDODRINE HYDROCHLORIDE 5 MG/1
15 TABLET ORAL EVERY 8 HOURS
Status: DISCONTINUED | OUTPATIENT
Start: 2020-01-01 | End: 2020-01-01

## 2020-01-01 RX ORDER — POLYETHYLENE GLYCOL 3350 17 G/17G
1 POWDER, FOR SOLUTION ORAL
Status: DISCONTINUED | OUTPATIENT
Start: 2020-01-01 | End: 2020-01-01

## 2020-01-01 RX ORDER — MORPHINE SULFATE 10 MG/ML
10 INJECTION, SOLUTION INTRAMUSCULAR; INTRAVENOUS
Status: DISCONTINUED | OUTPATIENT
Start: 2020-01-01 | End: 2020-01-01 | Stop reason: HOSPADM

## 2020-01-01 RX ORDER — AMOXICILLIN 250 MG
2 CAPSULE ORAL 2 TIMES DAILY
Status: DISCONTINUED | OUTPATIENT
Start: 2020-01-01 | End: 2020-01-01

## 2020-01-01 RX ORDER — FAMOTIDINE 20 MG/1
20 TABLET, FILM COATED ORAL DAILY
Status: DISCONTINUED | OUTPATIENT
Start: 2020-01-01 | End: 2020-01-01

## 2020-01-01 RX ORDER — PHENYLEPHRINE HCL IN 0.9% NACL 0.5 MG/5ML
200 SYRINGE (ML) INTRAVENOUS
Status: ACTIVE | OUTPATIENT
Start: 2020-01-01 | End: 2020-01-01

## 2020-01-01 RX ORDER — PANTOPRAZOLE SODIUM 40 MG/10ML
40 INJECTION, POWDER, LYOPHILIZED, FOR SOLUTION INTRAVENOUS 2 TIMES DAILY
Status: DISCONTINUED | OUTPATIENT
Start: 2020-01-01 | End: 2020-01-01

## 2020-01-01 RX ORDER — LORAZEPAM 2 MG/ML
2-4 INJECTION INTRAMUSCULAR
Status: DISCONTINUED | OUTPATIENT
Start: 2020-01-01 | End: 2020-01-01

## 2020-01-01 RX ORDER — SODIUM CHLORIDE, SODIUM LACTATE, POTASSIUM CHLORIDE, AND CALCIUM CHLORIDE .6; .31; .03; .02 G/100ML; G/100ML; G/100ML; G/100ML
1000 INJECTION, SOLUTION INTRAVENOUS ONCE
Status: COMPLETED | OUTPATIENT
Start: 2020-01-01 | End: 2020-01-01

## 2020-01-01 RX ORDER — BISACODYL 10 MG
10 SUPPOSITORY, RECTAL RECTAL
Status: DISCONTINUED | OUTPATIENT
Start: 2020-01-01 | End: 2020-01-01

## 2020-01-01 RX ORDER — LORAZEPAM 2 MG/ML
3 INJECTION INTRAMUSCULAR
Status: DISCONTINUED | OUTPATIENT
Start: 2020-01-01 | End: 2020-01-01

## 2020-01-01 RX ORDER — ONDANSETRON 2 MG/ML
4 INJECTION INTRAMUSCULAR; INTRAVENOUS EVERY 6 HOURS PRN
Status: DISCONTINUED | OUTPATIENT
Start: 2020-01-01 | End: 2020-01-01

## 2020-01-01 RX ORDER — LORAZEPAM 2 MG/ML
1 CONCENTRATE ORAL
Status: DISCONTINUED | OUTPATIENT
Start: 2020-01-01 | End: 2020-01-01 | Stop reason: HOSPADM

## 2020-01-01 RX ORDER — SODIUM CHLORIDE 9 MG/ML
1000 INJECTION, SOLUTION INTRAVENOUS ONCE
Status: COMPLETED | OUTPATIENT
Start: 2020-01-01 | End: 2020-01-01

## 2020-01-01 RX ORDER — THIAMINE MONONITRATE (VIT B1) 100 MG
100 TABLET ORAL DAILY
Status: DISCONTINUED | OUTPATIENT
Start: 2020-01-01 | End: 2020-01-01

## 2020-01-01 RX ORDER — SUCCINYLCHOLINE CHLORIDE 20 MG/ML
40 INJECTION INTRAMUSCULAR; INTRAVENOUS ONCE
Status: COMPLETED | OUTPATIENT
Start: 2020-01-01 | End: 2020-01-01

## 2020-01-01 RX ORDER — CALCIUM CHLORIDE 100 MG/ML
1 INJECTION INTRAVENOUS; INTRAVENTRICULAR ONCE
Status: DISCONTINUED | OUTPATIENT
Start: 2020-01-01 | End: 2020-01-01

## 2020-01-01 RX ORDER — FOLIC ACID 1 MG/1
1 TABLET ORAL DAILY
Status: DISPENSED | OUTPATIENT
Start: 2020-01-01 | End: 2020-01-01

## 2020-01-01 RX ORDER — LACTULOSE 20 G/30ML
30 SOLUTION ORAL 2 TIMES DAILY
Status: DISCONTINUED | OUTPATIENT
Start: 2020-01-01 | End: 2020-01-01

## 2020-01-01 RX ORDER — MIDODRINE HYDROCHLORIDE 5 MG/1
10 TABLET ORAL EVERY 8 HOURS
Status: DISCONTINUED | OUTPATIENT
Start: 2020-01-01 | End: 2020-01-01

## 2020-01-01 RX ORDER — OCTREOTIDE ACETATE 100 UG/ML
50 INJECTION, SOLUTION INTRAVENOUS; SUBCUTANEOUS ONCE
Status: DISCONTINUED | OUTPATIENT
Start: 2020-01-01 | End: 2020-01-01

## 2020-01-01 RX ORDER — PHENYLEPHRINE HCL IN 0.9% NACL 0.5 MG/5ML
100 SYRINGE (ML) INTRAVENOUS ONCE
Status: COMPLETED | OUTPATIENT
Start: 2020-01-01 | End: 2020-01-01

## 2020-01-01 RX ORDER — HEPARIN SODIUM 1000 [USP'U]/ML
2400 INJECTION, SOLUTION INTRAVENOUS; SUBCUTANEOUS
Status: DISCONTINUED | OUTPATIENT
Start: 2020-01-01 | End: 2020-01-01

## 2020-01-01 RX ORDER — LACTULOSE 20 G/30ML
30 SOLUTION ORAL 2 TIMES DAILY PRN
Status: DISCONTINUED | OUTPATIENT
Start: 2020-01-01 | End: 2020-01-01

## 2020-01-01 RX ORDER — MIDODRINE HYDROCHLORIDE 5 MG/1
5 TABLET ORAL ONCE
Status: ACTIVE | OUTPATIENT
Start: 2020-01-01 | End: 2020-01-01

## 2020-01-01 RX ORDER — SODIUM CHLORIDE, SODIUM LACTATE, POTASSIUM CHLORIDE, CALCIUM CHLORIDE 600; 310; 30; 20 MG/100ML; MG/100ML; MG/100ML; MG/100ML
INJECTION, SOLUTION INTRAVENOUS CONTINUOUS
Status: DISCONTINUED | OUTPATIENT
Start: 2020-01-01 | End: 2020-01-01

## 2020-01-01 RX ORDER — PHENYLEPHRINE HCL IN 0.9% NACL 0.5 MG/5ML
SYRINGE (ML) INTRAVENOUS
Status: COMPLETED
Start: 2020-01-01 | End: 2020-01-01

## 2020-01-01 RX ORDER — CALCIUM CHLORIDE 100 MG/ML
1 INJECTION INTRAVENOUS; INTRAVENTRICULAR ONCE
Status: COMPLETED | OUTPATIENT
Start: 2020-01-01 | End: 2020-01-01

## 2020-01-01 RX ORDER — MANNITOL 250 MG/ML
37.5 INJECTION, SOLUTION INTRAVENOUS ONCE
Status: COMPLETED | OUTPATIENT
Start: 2020-01-01 | End: 2020-01-01

## 2020-01-01 RX ORDER — ETOMIDATE 2 MG/ML
20 INJECTION INTRAVENOUS ONCE
Status: COMPLETED | OUTPATIENT
Start: 2020-01-01 | End: 2020-01-01

## 2020-01-01 RX ORDER — MAGNESIUM SULFATE 1 G/100ML
1 INJECTION INTRAVENOUS ONCE
Status: COMPLETED | OUTPATIENT
Start: 2020-01-01 | End: 2020-01-01

## 2020-01-01 RX ORDER — ERGOCALCIFEROL 1.25 MG/1
50000 CAPSULE ORAL
Status: DISCONTINUED | OUTPATIENT
Start: 2020-01-01 | End: 2020-01-01

## 2020-01-01 RX ORDER — PROPOFOL 10 MG/ML
100 INJECTION, EMULSION INTRAVENOUS ONCE
Status: COMPLETED | OUTPATIENT
Start: 2020-01-01 | End: 2020-01-01

## 2020-01-01 RX ORDER — MIDODRINE HYDROCHLORIDE 5 MG/1
5 TABLET ORAL EVERY 8 HOURS
Status: DISCONTINUED | OUTPATIENT
Start: 2020-01-01 | End: 2020-01-01

## 2020-01-01 RX ORDER — MORPHINE SULFATE 4 MG/ML
2 INJECTION, SOLUTION INTRAMUSCULAR; INTRAVENOUS
Status: DISCONTINUED | OUTPATIENT
Start: 2020-01-01 | End: 2020-01-01

## 2020-01-01 RX ORDER — MANNITOL 250 MG/ML
INJECTION, SOLUTION INTRAVENOUS
Status: COMPLETED
Start: 2020-01-01 | End: 2020-01-01

## 2020-01-01 RX ORDER — POTASSIUM CHLORIDE 20 MEQ/1
20 TABLET, EXTENDED RELEASE ORAL 3 TIMES DAILY
Status: DISCONTINUED | OUTPATIENT
Start: 2020-01-01 | End: 2020-01-01

## 2020-01-01 RX ORDER — FAMOTIDINE 20 MG/1
20 TABLET, FILM COATED ORAL EVERY 12 HOURS
Status: DISCONTINUED | OUTPATIENT
Start: 2020-01-01 | End: 2020-01-01

## 2020-01-01 RX ORDER — LACTULOSE 20 G/30ML
30 SOLUTION ORAL 3 TIMES DAILY
Status: DISCONTINUED | OUTPATIENT
Start: 2020-01-01 | End: 2020-01-01

## 2020-01-01 RX ORDER — LORAZEPAM 2 MG/ML
1 INJECTION INTRAMUSCULAR
Status: DISCONTINUED | OUTPATIENT
Start: 2020-01-01 | End: 2020-01-01

## 2020-01-01 RX ORDER — LORAZEPAM 2 MG/ML
1 INJECTION INTRAMUSCULAR
Status: DISCONTINUED | OUTPATIENT
Start: 2020-01-01 | End: 2020-01-01 | Stop reason: HOSPADM

## 2020-01-01 RX ORDER — MORPHINE SULFATE 10 MG/ML
5 INJECTION, SOLUTION INTRAMUSCULAR; INTRAVENOUS
Status: DISCONTINUED | OUTPATIENT
Start: 2020-01-01 | End: 2020-01-01 | Stop reason: HOSPADM

## 2020-01-01 RX ORDER — ATROPINE SULFATE 10 MG/ML
2 SOLUTION/ DROPS OPHTHALMIC EVERY 4 HOURS PRN
Status: DISCONTINUED | OUTPATIENT
Start: 2020-01-01 | End: 2020-01-01 | Stop reason: HOSPADM

## 2020-01-01 RX ORDER — DEXMEDETOMIDINE HYDROCHLORIDE 4 UG/ML
.1-1.5 INJECTION, SOLUTION INTRAVENOUS CONTINUOUS
Status: DISCONTINUED | OUTPATIENT
Start: 2020-01-01 | End: 2020-01-01

## 2020-01-01 RX ORDER — MAGNESIUM SULFATE HEPTAHYDRATE 40 MG/ML
2 INJECTION, SOLUTION INTRAVENOUS ONCE
Status: DISCONTINUED | OUTPATIENT
Start: 2020-01-01 | End: 2020-01-01

## 2020-01-01 RX ORDER — SODIUM CHLORIDE, SODIUM LACTATE, POTASSIUM CHLORIDE, AND CALCIUM CHLORIDE .6; .31; .03; .02 G/100ML; G/100ML; G/100ML; G/100ML
30 INJECTION, SOLUTION INTRAVENOUS
Status: DISCONTINUED | OUTPATIENT
Start: 2020-01-01 | End: 2020-01-01

## 2020-01-01 RX ADMIN — PIPERACILLIN AND TAZOBACTAM 4.5 G: 4; .5 INJECTION, POWDER, LYOPHILIZED, FOR SOLUTION INTRAVENOUS; PARENTERAL at 23:38

## 2020-01-01 RX ADMIN — PANTOPRAZOLE SODIUM 40 MG: 40 INJECTION, POWDER, LYOPHILIZED, FOR SOLUTION INTRAVENOUS at 16:59

## 2020-01-01 RX ADMIN — RIFAXIMIN 550 MG: 550 TABLET ORAL at 05:06

## 2020-01-01 RX ADMIN — PANTOPRAZOLE SODIUM 40 MG: 40 INJECTION, POWDER, LYOPHILIZED, FOR SOLUTION INTRAVENOUS at 05:30

## 2020-01-01 RX ADMIN — MAGNESIUM SULFATE 1 G: 1 INJECTION INTRAVENOUS at 12:01

## 2020-01-01 RX ADMIN — OCTREOTIDE ACETATE 50 MCG/HR: 200 INJECTION, SOLUTION INTRAVENOUS; SUBCUTANEOUS at 16:21

## 2020-01-01 RX ADMIN — RIFAXIMIN 550 MG: 550 TABLET ORAL at 17:18

## 2020-01-01 RX ADMIN — VASOPRESSIN 0.03 UNITS/MIN: 20 INJECTION INTRAVENOUS at 10:45

## 2020-01-01 RX ADMIN — MIDODRINE HYDROCHLORIDE 5 MG: 5 TABLET ORAL at 22:57

## 2020-01-01 RX ADMIN — NOREPINEPHRINE BITARTRATE 13 MCG/MIN: 1 INJECTION INTRAVENOUS at 10:00

## 2020-01-01 RX ADMIN — HYDROCORTISONE SODIUM SUCCINATE 50 MG: 100 INJECTION, POWDER, FOR SOLUTION INTRAMUSCULAR; INTRAVENOUS at 05:12

## 2020-01-01 RX ADMIN — FENTANYL CITRATE 50 MCG: 0.05 INJECTION, SOLUTION INTRAMUSCULAR; INTRAVENOUS at 14:29

## 2020-01-01 RX ADMIN — INSULIN HUMAN 1 UNITS: 100 INJECTION, SOLUTION PARENTERAL at 05:48

## 2020-01-01 RX ADMIN — HYDROCORTISONE SODIUM SUCCINATE 50 MG: 100 INJECTION, POWDER, FOR SOLUTION INTRAMUSCULAR; INTRAVENOUS at 13:13

## 2020-01-01 RX ADMIN — VASOPRESSIN 0.03 UNITS/MIN: 20 INJECTION INTRAVENOUS at 12:11

## 2020-01-01 RX ADMIN — ONDANSETRON 4 MG: 2 INJECTION INTRAMUSCULAR; INTRAVENOUS at 23:58

## 2020-01-01 RX ADMIN — Medication 200 MCG: at 16:25

## 2020-01-01 RX ADMIN — MIDODRINE HYDROCHLORIDE 15 MG: 5 TABLET ORAL at 14:12

## 2020-01-01 RX ADMIN — MIDODRINE HYDROCHLORIDE 15 MG: 5 TABLET ORAL at 05:22

## 2020-01-01 RX ADMIN — PANTOPRAZOLE SODIUM 40 MG: 40 INJECTION, POWDER, LYOPHILIZED, FOR SOLUTION INTRAVENOUS at 05:05

## 2020-01-01 RX ADMIN — SODIUM CHLORIDE 8 MG/HR: 9 INJECTION, SOLUTION INTRAVENOUS at 16:23

## 2020-01-01 RX ADMIN — HYDROCORTISONE SODIUM SUCCINATE 50 MG: 100 INJECTION, POWDER, FOR SOLUTION INTRAMUSCULAR; INTRAVENOUS at 00:14

## 2020-01-01 RX ADMIN — THIAMINE HYDROCHLORIDE 200 MG: 100 INJECTION, SOLUTION INTRAMUSCULAR; INTRAVENOUS at 18:13

## 2020-01-01 RX ADMIN — MUPIROCIN 1 APPLICATION: 20 OINTMENT TOPICAL at 05:31

## 2020-01-01 RX ADMIN — THIAMINE HYDROCHLORIDE: 100 INJECTION, SOLUTION INTRAMUSCULAR; INTRAVENOUS at 16:06

## 2020-01-01 RX ADMIN — NOREPINEPHRINE BITARTRATE 25 MCG/MIN: 1 INJECTION INTRAVENOUS at 06:02

## 2020-01-01 RX ADMIN — HYDROCORTISONE SODIUM SUCCINATE 50 MG: 100 INJECTION, POWDER, FOR SOLUTION INTRAMUSCULAR; INTRAVENOUS at 17:28

## 2020-01-01 RX ADMIN — PANTOPRAZOLE SODIUM 40 MG: 40 INJECTION, POWDER, LYOPHILIZED, FOR SOLUTION INTRAVENOUS at 05:36

## 2020-01-01 RX ADMIN — MUPIROCIN 2 %: 20 OINTMENT TOPICAL at 05:35

## 2020-01-01 RX ADMIN — HYDROCORTISONE SODIUM SUCCINATE 50 MG: 100 INJECTION, POWDER, FOR SOLUTION INTRAMUSCULAR; INTRAVENOUS at 05:32

## 2020-01-01 RX ADMIN — MIDODRINE HYDROCHLORIDE 15 MG: 5 TABLET ORAL at 14:48

## 2020-01-01 RX ADMIN — DEXMEDETOMIDINE HYDROCHLORIDE 0.7 MCG/KG/HR: 100 INJECTION, SOLUTION INTRAVENOUS at 11:01

## 2020-01-01 RX ADMIN — DEXMEDETOMIDINE HYDROCHLORIDE 0.8 MCG/KG/HR: 100 INJECTION, SOLUTION INTRAVENOUS at 19:38

## 2020-01-01 RX ADMIN — SODIUM CHLORIDE, POTASSIUM CHLORIDE, SODIUM LACTATE AND CALCIUM CHLORIDE 1000 ML: 600; 310; 30; 20 INJECTION, SOLUTION INTRAVENOUS at 12:50

## 2020-01-01 RX ADMIN — Medication 100 MG: at 05:36

## 2020-01-01 RX ADMIN — MORPHINE SULFATE 2 MG: 4 INJECTION INTRAVENOUS at 10:00

## 2020-01-01 RX ADMIN — PANTOPRAZOLE SODIUM 40 MG: 40 INJECTION, POWDER, LYOPHILIZED, FOR SOLUTION INTRAVENOUS at 17:30

## 2020-01-01 RX ADMIN — Medication 10 MG: at 16:55

## 2020-01-01 RX ADMIN — MIDODRINE HYDROCHLORIDE 15 MG: 5 TABLET ORAL at 21:44

## 2020-01-01 RX ADMIN — VASOPRESSIN 0.03 UNITS/MIN: 20 INJECTION INTRAVENOUS at 11:01

## 2020-01-01 RX ADMIN — LACTULOSE 30 ML: 20 SOLUTION ORAL at 12:23

## 2020-01-01 RX ADMIN — OCTREOTIDE ACETATE 50 MCG: 100 INJECTION, SOLUTION INTRAVENOUS; SUBCUTANEOUS at 13:00

## 2020-01-01 RX ADMIN — PIPERACILLIN SODIUM AND TAZOBACTAM SODIUM 2.25 G: 3; .375 INJECTION, POWDER, FOR SOLUTION INTRAVENOUS at 14:04

## 2020-01-01 RX ADMIN — OMEPRAZOLE 40 MG: KIT at 05:21

## 2020-01-01 RX ADMIN — LORAZEPAM 1 MG: 2 INJECTION INTRAMUSCULAR; INTRAVENOUS at 19:17

## 2020-01-01 RX ADMIN — PIPERACILLIN AND TAZOBACTAM 4.5 G: 4; .5 INJECTION, POWDER, LYOPHILIZED, FOR SOLUTION INTRAVENOUS; PARENTERAL at 18:32

## 2020-01-01 RX ADMIN — NOREPINEPHRINE BITARTRATE 15 MCG/MIN: 1 INJECTION INTRAVENOUS at 20:47

## 2020-01-01 RX ADMIN — PANTOPRAZOLE SODIUM 40 MG: 40 INJECTION, POWDER, LYOPHILIZED, FOR SOLUTION INTRAVENOUS at 05:40

## 2020-01-01 RX ADMIN — FENTANYL CITRATE 100 MCG: 0.05 INJECTION, SOLUTION INTRAMUSCULAR; INTRAVENOUS at 18:03

## 2020-01-01 RX ADMIN — THIAMINE HYDROCHLORIDE 200 MG: 100 INJECTION, SOLUTION INTRAMUSCULAR; INTRAVENOUS at 07:43

## 2020-01-01 RX ADMIN — LACTULOSE 30 ML: 20 SOLUTION ORAL at 05:23

## 2020-01-01 RX ADMIN — SODIUM BICARBONATE: 84 INJECTION, SOLUTION INTRAVENOUS at 01:34

## 2020-01-01 RX ADMIN — LACTULOSE 30 ML: 20 SOLUTION ORAL at 17:30

## 2020-01-01 RX ADMIN — HYDROCORTISONE SODIUM SUCCINATE 50 MG: 100 INJECTION, POWDER, FOR SOLUTION INTRAMUSCULAR; INTRAVENOUS at 17:06

## 2020-01-01 RX ADMIN — MIDODRINE HYDROCHLORIDE 15 MG: 5 TABLET ORAL at 05:31

## 2020-01-01 RX ADMIN — FENTANYL CITRATE 100 MCG: 0.05 INJECTION, SOLUTION INTRAMUSCULAR; INTRAVENOUS at 15:25

## 2020-01-01 RX ADMIN — ONDANSETRON 4 MG: 2 INJECTION INTRAMUSCULAR; INTRAVENOUS at 18:02

## 2020-01-01 RX ADMIN — MORPHINE SULFATE 2 MG: 4 INJECTION INTRAVENOUS at 11:42

## 2020-01-01 RX ADMIN — MIDODRINE HYDROCHLORIDE 15 MG: 5 TABLET ORAL at 22:11

## 2020-01-01 RX ADMIN — PIPERACILLIN AND TAZOBACTAM 4.5 G: 4; .5 INJECTION, POWDER, LYOPHILIZED, FOR SOLUTION INTRAVENOUS; PARENTERAL at 22:57

## 2020-01-01 RX ADMIN — INSULIN HUMAN 1 UNITS: 100 INJECTION, SOLUTION PARENTERAL at 11:06

## 2020-01-01 RX ADMIN — SODIUM CHLORIDE 80 MG: 9 INJECTION, SOLUTION INTRAVENOUS at 12:57

## 2020-01-01 RX ADMIN — MAGNESIUM SULFATE 2 G: 2 INJECTION INTRAVENOUS at 08:09

## 2020-01-01 RX ADMIN — THERA TABS 1 TABLET: TAB at 05:36

## 2020-01-01 RX ADMIN — VASOPRESSIN 0.03 UNITS/MIN: 20 INJECTION INTRAVENOUS at 00:04

## 2020-01-01 RX ADMIN — PANTOPRAZOLE SODIUM 40 MG: 40 INJECTION, POWDER, LYOPHILIZED, FOR SOLUTION INTRAVENOUS at 05:42

## 2020-01-01 RX ADMIN — CALCIUM CHLORIDE 1 G: 100 INJECTION, SOLUTION INTRAVENOUS at 15:07

## 2020-01-01 RX ADMIN — PIPERACILLIN AND TAZOBACTAM 4.5 G: 4; .5 INJECTION, POWDER, LYOPHILIZED, FOR SOLUTION INTRAVENOUS; PARENTERAL at 17:25

## 2020-01-01 RX ADMIN — PIPERACILLIN AND TAZOBACTAM 4.5 G: 4; .5 INJECTION, POWDER, LYOPHILIZED, FOR SOLUTION INTRAVENOUS; PARENTERAL at 23:25

## 2020-01-01 RX ADMIN — CALCIUM CHLORIDE 1 G: 100 INJECTION, SOLUTION INTRAVENOUS at 05:11

## 2020-01-01 RX ADMIN — PIPERACILLIN AND TAZOBACTAM 4.5 G: 4; .5 INJECTION, POWDER, LYOPHILIZED, FOR SOLUTION INTRAVENOUS; PARENTERAL at 23:54

## 2020-01-01 RX ADMIN — VASOPRESSIN 0.03 UNITS/MIN: 20 INJECTION INTRAVENOUS at 23:32

## 2020-01-01 RX ADMIN — VASOPRESSIN 0.03 UNITS/MIN: 20 INJECTION INTRAVENOUS at 16:38

## 2020-01-01 RX ADMIN — ATROPINE SULFATE 2 DROP: 10 SOLUTION OPHTHALMIC at 10:31

## 2020-01-01 RX ADMIN — MORPHINE SULFATE 2 MG: 4 INJECTION INTRAVENOUS at 02:16

## 2020-01-01 RX ADMIN — LACTULOSE 30 ML: 20 SOLUTION ORAL at 13:31

## 2020-01-01 RX ADMIN — SODIUM BICARBONATE 50 MEQ: 84 INJECTION, SOLUTION INTRAVENOUS at 12:43

## 2020-01-01 RX ADMIN — LACTULOSE 30 ML: 20 SOLUTION ORAL at 18:32

## 2020-01-01 RX ADMIN — MUPIROCIN 2 %: 20 OINTMENT TOPICAL at 05:32

## 2020-01-01 RX ADMIN — MIDODRINE HYDROCHLORIDE 15 MG: 5 TABLET ORAL at 05:21

## 2020-01-01 RX ADMIN — RIFAXIMIN 550 MG: 550 TABLET ORAL at 05:05

## 2020-01-01 RX ADMIN — MIDODRINE HYDROCHLORIDE 15 MG: 5 TABLET ORAL at 05:06

## 2020-01-01 RX ADMIN — LACTULOSE 30 ML: 20 SOLUTION ORAL at 05:05

## 2020-01-01 RX ADMIN — HYDROCORTISONE SODIUM SUCCINATE 50 MG: 100 INJECTION, POWDER, FOR SOLUTION INTRAMUSCULAR; INTRAVENOUS at 23:54

## 2020-01-01 RX ADMIN — MUPIROCIN 1 APPLICATION: 20 OINTMENT TOPICAL at 05:26

## 2020-01-01 RX ADMIN — INSULIN HUMAN 2 UNITS: 100 INJECTION, SOLUTION PARENTERAL at 11:58

## 2020-01-01 RX ADMIN — PIPERACILLIN AND TAZOBACTAM 4.5 G: 4; .5 INJECTION, POWDER, LYOPHILIZED, FOR SOLUTION INTRAVENOUS; PARENTERAL at 13:08

## 2020-01-01 RX ADMIN — MUPIROCIN 1 APPLICATOR: 20 OINTMENT TOPICAL at 17:59

## 2020-01-01 RX ADMIN — RIFAXIMIN 550 MG: 550 TABLET ORAL at 18:32

## 2020-01-01 RX ADMIN — CALCIUM CHLORIDE 1 G: 100 INJECTION INTRAVENOUS; INTRAVENTRICULAR at 12:43

## 2020-01-01 RX ADMIN — CALCIUM CHLORIDE 1 G: 100 INJECTION, SOLUTION INTRAVENOUS at 23:54

## 2020-01-01 RX ADMIN — THERA TABS 1 TABLET: TAB at 05:37

## 2020-01-01 RX ADMIN — PANTOPRAZOLE SODIUM 40 MG: 40 INJECTION, POWDER, LYOPHILIZED, FOR SOLUTION INTRAVENOUS at 17:59

## 2020-01-01 RX ADMIN — THIAMINE HYDROCHLORIDE 200 MG: 100 INJECTION, SOLUTION INTRAMUSCULAR; INTRAVENOUS at 06:47

## 2020-01-01 RX ADMIN — NOREPINEPHRINE BITARTRATE 20 MCG/MIN: 1 INJECTION INTRAVENOUS at 12:00

## 2020-01-01 RX ADMIN — SODIUM CHLORIDE 8 MG/HR: 9 INJECTION, SOLUTION INTRAVENOUS at 01:35

## 2020-01-01 RX ADMIN — FENTANYL CITRATE 50 MCG: 0.05 INJECTION, SOLUTION INTRAMUSCULAR; INTRAVENOUS at 10:23

## 2020-01-01 RX ADMIN — MUPIROCIN 1 DOSE: 20 OINTMENT TOPICAL at 17:38

## 2020-01-01 RX ADMIN — VASOPRESSIN 0.03 UNITS/MIN: 20 INJECTION INTRAVENOUS at 02:00

## 2020-01-01 RX ADMIN — LACTULOSE 30 ML: 20 SOLUTION ORAL at 10:48

## 2020-01-01 RX ADMIN — FAMOTIDINE 20 MG: 10 INJECTION INTRAVENOUS at 17:59

## 2020-01-01 RX ADMIN — OMEPRAZOLE 40 MG: KIT at 19:14

## 2020-01-01 RX ADMIN — PIPERACILLIN AND TAZOBACTAM 4.5 G: 4; .5 INJECTION, POWDER, LYOPHILIZED, FOR SOLUTION INTRAVENOUS; PARENTERAL at 11:31

## 2020-01-01 RX ADMIN — PANTOPRAZOLE SODIUM 40 MG: 40 INJECTION, POWDER, LYOPHILIZED, FOR SOLUTION INTRAVENOUS at 18:20

## 2020-01-01 RX ADMIN — MUPIROCIN 1 DOSE: 20 OINTMENT TOPICAL at 17:07

## 2020-01-01 RX ADMIN — OMEPRAZOLE 40 MG: KIT at 05:23

## 2020-01-01 RX ADMIN — VASOPRESSIN 0.03 UNITS/MIN: 20 INJECTION INTRAVENOUS at 12:30

## 2020-01-01 RX ADMIN — MIDODRINE HYDROCHLORIDE 5 MG: 5 TABLET ORAL at 10:49

## 2020-01-01 RX ADMIN — Medication 100 MG: at 15:07

## 2020-01-01 RX ADMIN — THIAMINE HYDROCHLORIDE 200 MG: 100 INJECTION, SOLUTION INTRAMUSCULAR; INTRAVENOUS at 05:25

## 2020-01-01 RX ADMIN — MIDODRINE HYDROCHLORIDE 5 MG: 5 TABLET ORAL at 05:36

## 2020-01-01 RX ADMIN — VASOPRESSIN 0.03 UNITS/MIN: 20 INJECTION INTRAVENOUS at 09:35

## 2020-01-01 RX ADMIN — NOREPINEPHRINE BITARTRATE 10 MCG/MIN: 1 INJECTION INTRAVENOUS at 13:30

## 2020-01-01 RX ADMIN — OCTREOTIDE ACETATE 50 MCG/HR: 200 INJECTION, SOLUTION INTRAVENOUS; SUBCUTANEOUS at 13:00

## 2020-01-01 RX ADMIN — VASOPRESSIN 0.03 UNITS/MIN: 20 INJECTION INTRAVENOUS at 00:06

## 2020-01-01 RX ADMIN — THIAMINE HYDROCHLORIDE 200 MG: 100 INJECTION, SOLUTION INTRAMUSCULAR; INTRAVENOUS at 18:34

## 2020-01-01 RX ADMIN — THIAMINE HYDROCHLORIDE 200 MG: 100 INJECTION, SOLUTION INTRAMUSCULAR; INTRAVENOUS at 17:17

## 2020-01-01 RX ADMIN — MANNITOL 37.5 G: 250 INJECTION, SOLUTION INTRAVENOUS at 19:40

## 2020-01-01 RX ADMIN — SUCCINYLCHOLINE CHLORIDE 40 MG: 20 INJECTION, SOLUTION INTRAMUSCULAR; INTRAVENOUS; PARENTERAL at 15:25

## 2020-01-01 RX ADMIN — MIDODRINE HYDROCHLORIDE 15 MG: 5 TABLET ORAL at 14:02

## 2020-01-01 RX ADMIN — Medication 200 MCG: at 12:43

## 2020-01-01 RX ADMIN — ATROPINE SULFATE 2 DROP: 10 SOLUTION OPHTHALMIC at 16:34

## 2020-01-01 RX ADMIN — SODIUM CHLORIDE 8 MG/HR: 9 INJECTION, SOLUTION INTRAVENOUS at 13:00

## 2020-01-01 RX ADMIN — FOLIC ACID 1 MG: 1 TABLET ORAL at 05:36

## 2020-01-01 RX ADMIN — INSULIN HUMAN 1 UNITS: 100 INJECTION, SOLUTION PARENTERAL at 23:42

## 2020-01-01 RX ADMIN — PREDNISOLONE 39.9 MG: 15 SOLUTION ORAL at 05:05

## 2020-01-01 RX ADMIN — HYDROCORTISONE SODIUM SUCCINATE 50 MG: 100 INJECTION, POWDER, FOR SOLUTION INTRAMUSCULAR; INTRAVENOUS at 23:22

## 2020-01-01 RX ADMIN — RIFAXIMIN 550 MG: 550 TABLET ORAL at 17:29

## 2020-01-01 RX ADMIN — SODIUM CHLORIDE 1000 ML: 9 INJECTION, SOLUTION INTRAVENOUS at 13:31

## 2020-01-01 RX ADMIN — LORAZEPAM 1 MG: 2 INJECTION INTRAMUSCULAR; INTRAVENOUS at 16:33

## 2020-01-01 RX ADMIN — Medication 100 MG: at 05:23

## 2020-01-01 RX ADMIN — VASOPRESSIN 0.03 UNITS/MIN: 20 INJECTION INTRAVENOUS at 03:34

## 2020-01-01 RX ADMIN — OMEPRAZOLE 40 MG: KIT at 17:18

## 2020-01-01 RX ADMIN — LACTULOSE 30 ML: 20 SOLUTION ORAL at 17:59

## 2020-01-01 RX ADMIN — LACTULOSE 30 ML: 20 SOLUTION ORAL at 16:58

## 2020-01-01 RX ADMIN — PIPERACILLIN AND TAZOBACTAM 4.5 G: 4; .5 INJECTION, POWDER, LYOPHILIZED, FOR SOLUTION INTRAVENOUS; PARENTERAL at 00:14

## 2020-01-01 RX ADMIN — LACTULOSE 30 ML: 20 SOLUTION ORAL at 12:27

## 2020-01-01 RX ADMIN — SODIUM PHOSPHATE, MONOBASIC, MONOHYDRATE AND SODIUM PHOSPHATE, DIBASIC, ANHYDROUS 30 MMOL: 276; 142 INJECTION, SOLUTION INTRAVENOUS at 12:24

## 2020-01-01 RX ADMIN — PANTOPRAZOLE SODIUM 40 MG: 40 INJECTION, POWDER, LYOPHILIZED, FOR SOLUTION INTRAVENOUS at 18:34

## 2020-01-01 RX ADMIN — LACTULOSE 30 ML: 20 SOLUTION ORAL at 05:31

## 2020-01-01 RX ADMIN — PHYTONADIONE 10 MG: 10 INJECTION, EMULSION INTRAMUSCULAR; INTRAVENOUS; SUBCUTANEOUS at 16:01

## 2020-01-01 RX ADMIN — PANTOPRAZOLE SODIUM 40 MG: 40 INJECTION, POWDER, LYOPHILIZED, FOR SOLUTION INTRAVENOUS at 18:37

## 2020-01-01 RX ADMIN — MIDODRINE HYDROCHLORIDE 10 MG: 5 TABLET ORAL at 23:53

## 2020-01-01 RX ADMIN — HYDROCORTISONE SODIUM SUCCINATE 50 MG: 100 INJECTION, POWDER, FOR SOLUTION INTRAMUSCULAR; INTRAVENOUS at 11:30

## 2020-01-01 RX ADMIN — HEPARIN SODIUM 2400 UNITS: 1000 INJECTION, SOLUTION INTRAVENOUS; SUBCUTANEOUS at 17:16

## 2020-01-01 RX ADMIN — HYDROCORTISONE SODIUM SUCCINATE 50 MG: 100 INJECTION, POWDER, FOR SOLUTION INTRAMUSCULAR; INTRAVENOUS at 18:34

## 2020-01-01 RX ADMIN — NOREPINEPHRINE BITARTRATE 5 MCG/MIN: 1 INJECTION INTRAVENOUS at 18:31

## 2020-01-01 RX ADMIN — MORPHINE SULFATE 2 MG: 4 INJECTION INTRAVENOUS at 20:45

## 2020-01-01 RX ADMIN — MIDODRINE HYDROCHLORIDE 15 MG: 5 TABLET ORAL at 23:22

## 2020-01-01 RX ADMIN — DEXMEDETOMIDINE HYDROCHLORIDE 0.8 MCG/KG/HR: 100 INJECTION, SOLUTION INTRAVENOUS at 11:06

## 2020-01-01 RX ADMIN — PIPERACILLIN SODIUM AND TAZOBACTAM SODIUM 2.25 G: 3; .375 INJECTION, POWDER, FOR SOLUTION INTRAVENOUS at 05:32

## 2020-01-01 RX ADMIN — MORPHINE SULFATE 2 MG: 4 INJECTION INTRAVENOUS at 12:52

## 2020-01-01 RX ADMIN — THIAMINE HYDROCHLORIDE 200 MG: 100 INJECTION, SOLUTION INTRAMUSCULAR; INTRAVENOUS at 06:39

## 2020-01-01 RX ADMIN — HYDROCORTISONE SODIUM SUCCINATE 50 MG: 100 INJECTION, POWDER, FOR SOLUTION INTRAMUSCULAR; INTRAVENOUS at 13:36

## 2020-01-01 RX ADMIN — SODIUM CHLORIDE, POTASSIUM CHLORIDE, SODIUM LACTATE AND CALCIUM CHLORIDE: 600; 310; 30; 20 INJECTION, SOLUTION INTRAVENOUS at 08:44

## 2020-01-01 RX ADMIN — DEXMEDETOMIDINE HYDROCHLORIDE 0.2 MCG/KG/HR: 100 INJECTION, SOLUTION INTRAVENOUS at 15:40

## 2020-01-01 RX ADMIN — VASOPRESSIN 0.03 UNITS/MIN: 20 INJECTION INTRAVENOUS at 12:27

## 2020-01-01 RX ADMIN — HYDROCORTISONE SODIUM SUCCINATE 50 MG: 100 INJECTION, POWDER, FOR SOLUTION INTRAMUSCULAR; INTRAVENOUS at 17:52

## 2020-01-01 RX ADMIN — PHYTONADIONE 10 MG: 10 INJECTION, EMULSION INTRAMUSCULAR; INTRAVENOUS; SUBCUTANEOUS at 11:08

## 2020-01-01 RX ADMIN — CEFTRIAXONE SODIUM 2 G: 2 INJECTION, POWDER, FOR SOLUTION INTRAMUSCULAR; INTRAVENOUS at 13:00

## 2020-01-01 RX ADMIN — NOREPINEPHRINE BITARTRATE 8 MCG/MIN: 1 INJECTION INTRAVENOUS at 15:34

## 2020-01-01 RX ADMIN — THIAMINE HYDROCHLORIDE 200 MG: 100 INJECTION, SOLUTION INTRAMUSCULAR; INTRAVENOUS at 17:47

## 2020-01-01 RX ADMIN — DEXMEDETOMIDINE HYDROCHLORIDE 0.8 MCG/KG/HR: 100 INJECTION, SOLUTION INTRAVENOUS at 02:43

## 2020-01-01 RX ADMIN — MIDODRINE HYDROCHLORIDE 15 MG: 5 TABLET ORAL at 22:42

## 2020-01-01 RX ADMIN — HYDROCORTISONE SODIUM SUCCINATE 50 MG: 100 INJECTION, POWDER, FOR SOLUTION INTRAMUSCULAR; INTRAVENOUS at 05:31

## 2020-01-01 RX ADMIN — CALCIUM CHLORIDE 1 G: 100 INJECTION, SOLUTION INTRAVENOUS at 10:49

## 2020-01-01 RX ADMIN — HYDROCORTISONE SODIUM SUCCINATE 50 MG: 100 INJECTION, POWDER, FOR SOLUTION INTRAMUSCULAR; INTRAVENOUS at 22:57

## 2020-01-01 RX ADMIN — Medication 50 MEQ: at 16:40

## 2020-01-01 RX ADMIN — PREDNISOLONE 39.9 MG: 15 SOLUTION ORAL at 05:32

## 2020-01-01 RX ADMIN — INSULIN HUMAN 1 UNITS: 100 INJECTION, SOLUTION PARENTERAL at 18:35

## 2020-01-01 RX ADMIN — VASOPRESSIN 0.03 UNITS/MIN: 20 INJECTION INTRAVENOUS at 11:07

## 2020-01-01 RX ADMIN — MUPIROCIN 2 %: 20 OINTMENT TOPICAL at 05:42

## 2020-01-01 RX ADMIN — HYDROCORTISONE SODIUM SUCCINATE 50 MG: 100 INJECTION, POWDER, FOR SOLUTION INTRAMUSCULAR; INTRAVENOUS at 11:07

## 2020-01-01 RX ADMIN — PHYTONADIONE 10 MG: 10 INJECTION, EMULSION INTRAMUSCULAR; INTRAVENOUS; SUBCUTANEOUS at 05:35

## 2020-01-01 RX ADMIN — FAMOTIDINE 20 MG: 20 TABLET ORAL at 05:31

## 2020-01-01 RX ADMIN — PIPERACILLIN AND TAZOBACTAM 4.5 G: 4; .5 INJECTION, POWDER, LYOPHILIZED, FOR SOLUTION INTRAVENOUS; PARENTERAL at 13:38

## 2020-01-01 RX ADMIN — LACTULOSE 30 ML: 20 SOLUTION ORAL at 17:18

## 2020-01-01 RX ADMIN — NOREPINEPHRINE BITARTRATE 8 MCG/MIN: 1 INJECTION INTRAVENOUS at 12:02

## 2020-01-01 RX ADMIN — FENTANYL CITRATE 100 MCG: 0.05 INJECTION, SOLUTION INTRAMUSCULAR; INTRAVENOUS at 13:54

## 2020-01-01 RX ADMIN — PIPERACILLIN AND TAZOBACTAM 4.5 G: 4; .5 INJECTION, POWDER, LYOPHILIZED, FOR SOLUTION INTRAVENOUS; PARENTERAL at 12:02

## 2020-01-01 RX ADMIN — OCTREOTIDE ACETATE 50 MCG/HR: 200 INJECTION, SOLUTION INTRAVENOUS; SUBCUTANEOUS at 12:00

## 2020-01-01 RX ADMIN — ONDANSETRON 4 MG: 2 INJECTION INTRAMUSCULAR; INTRAVENOUS at 15:38

## 2020-01-01 RX ADMIN — HEPARIN SODIUM 2400 UNITS: 1000 INJECTION, SOLUTION INTRAVENOUS; SUBCUTANEOUS at 19:45

## 2020-01-01 RX ADMIN — DEXMEDETOMIDINE HYDROCHLORIDE 1 MCG/KG/HR: 100 INJECTION, SOLUTION INTRAVENOUS at 21:07

## 2020-01-01 RX ADMIN — PIPERACILLIN AND TAZOBACTAM 4.5 G: 4; .5 INJECTION, POWDER, LYOPHILIZED, FOR SOLUTION INTRAVENOUS; PARENTERAL at 10:49

## 2020-01-01 RX ADMIN — THIAMINE HYDROCHLORIDE 200 MG: 100 INJECTION, SOLUTION INTRAMUSCULAR; INTRAVENOUS at 19:44

## 2020-01-01 RX ADMIN — FENTANYL CITRATE 50 MCG: 0.05 INJECTION, SOLUTION INTRAMUSCULAR; INTRAVENOUS at 12:25

## 2020-01-01 RX ADMIN — NOREPINEPHRINE BITARTRATE 15 MCG/MIN: 1 INJECTION INTRAVENOUS at 05:30

## 2020-01-01 RX ADMIN — NOREPINEPHRINE BITARTRATE 13 MCG/MIN: 1 INJECTION INTRAVENOUS at 14:03

## 2020-01-01 RX ADMIN — THIAMINE HYDROCHLORIDE 500 MG: 100 INJECTION, SOLUTION INTRAMUSCULAR; INTRAVENOUS at 13:52

## 2020-01-01 RX ADMIN — VASOPRESSIN 0.03 UNITS/MIN: 20 INJECTION INTRAVENOUS at 00:14

## 2020-01-01 RX ADMIN — THIAMINE HYDROCHLORIDE 200 MG: 100 INJECTION, SOLUTION INTRAMUSCULAR; INTRAVENOUS at 05:30

## 2020-01-01 RX ADMIN — MANNITOL 37.5 G: 12.5 INJECTION, SOLUTION INTRAVENOUS at 19:40

## 2020-01-01 RX ADMIN — VASOPRESSIN 0.03 UNITS/MIN: 20 INJECTION INTRAVENOUS at 01:14

## 2020-01-01 RX ADMIN — DEXMEDETOMIDINE HYDROCHLORIDE 0.7 MCG/KG/HR: 100 INJECTION, SOLUTION INTRAVENOUS at 18:30

## 2020-01-01 RX ADMIN — HEPARIN SODIUM 2400 UNITS: 1000 INJECTION, SOLUTION INTRAVENOUS; SUBCUTANEOUS at 15:47

## 2020-01-01 RX ADMIN — HYDROCORTISONE SODIUM SUCCINATE 50 MG: 100 INJECTION, POWDER, FOR SOLUTION INTRAMUSCULAR; INTRAVENOUS at 18:21

## 2020-01-01 RX ADMIN — ALBUTEROL SULFATE 10 MG: 2.5 SOLUTION RESPIRATORY (INHALATION) at 16:55

## 2020-01-01 RX ADMIN — MIDODRINE HYDROCHLORIDE 10 MG: 5 TABLET ORAL at 13:06

## 2020-01-01 RX ADMIN — ETOMIDATE 20 MG: 2 INJECTION INTRAVENOUS at 15:25

## 2020-01-01 RX ADMIN — PANTOPRAZOLE SODIUM 40 MG: 40 INJECTION, POWDER, LYOPHILIZED, FOR SOLUTION INTRAVENOUS at 05:31

## 2020-01-01 RX ADMIN — HYDROCORTISONE SODIUM SUCCINATE 50 MG: 100 INJECTION, POWDER, FOR SOLUTION INTRAMUSCULAR; INTRAVENOUS at 05:25

## 2020-01-01 RX ADMIN — Medication 100 MG: at 05:21

## 2020-01-01 RX ADMIN — PROPOFOL 60 MG: 10 INJECTION, EMULSION INTRAVENOUS at 13:53

## 2020-01-01 RX ADMIN — MIDODRINE HYDROCHLORIDE 15 MG: 5 TABLET ORAL at 15:32

## 2020-01-01 RX ADMIN — CALCIUM CHLORIDE 1 G: 100 INJECTION INTRAVENOUS; INTRAVENTRICULAR at 13:34

## 2020-01-01 RX ADMIN — DEXTROSE MONOHYDRATE 50 ML: 25 INJECTION, SOLUTION INTRAVENOUS at 13:53

## 2020-01-01 RX ADMIN — MUPIROCIN 2 %: 20 OINTMENT TOPICAL at 19:22

## 2020-01-01 RX ADMIN — MIDODRINE HYDROCHLORIDE 15 MG: 5 TABLET ORAL at 08:14

## 2020-01-01 RX ADMIN — PANTOPRAZOLE SODIUM 40 MG: 40 INJECTION, POWDER, LYOPHILIZED, FOR SOLUTION INTRAVENOUS at 17:52

## 2020-01-01 RX ADMIN — HYDROCORTISONE SODIUM SUCCINATE 50 MG: 100 INJECTION, POWDER, FOR SOLUTION INTRAMUSCULAR; INTRAVENOUS at 12:01

## 2020-01-01 RX ADMIN — SODIUM BICARBONATE 50 MEQ: 84 INJECTION, SOLUTION INTRAVENOUS at 16:40

## 2020-01-01 RX ADMIN — VASOPRESSIN 0.03 UNITS/MIN: 20 INJECTION INTRAVENOUS at 10:48

## 2020-01-01 RX ADMIN — HEPARIN SODIUM 2400 UNITS: 1000 INJECTION, SOLUTION INTRAVENOUS; SUBCUTANEOUS at 08:05

## 2020-01-01 RX ADMIN — FOLIC ACID 1 MG: 1 TABLET ORAL at 05:37

## 2020-01-01 RX ADMIN — PIPERACILLIN SODIUM AND TAZOBACTAM SODIUM 2.25 G: 3; .375 INJECTION, POWDER, FOR SOLUTION INTRAVENOUS at 22:11

## 2020-01-01 RX ADMIN — RIFAXIMIN 550 MG: 550 TABLET ORAL at 05:23

## 2020-01-01 RX ADMIN — HEPARIN SODIUM 2400 UNITS: 1000 INJECTION, SOLUTION INTRAVENOUS; SUBCUTANEOUS at 12:51

## 2020-01-01 RX ADMIN — PREDNISOLONE 39.9 MG: 15 SOLUTION ORAL at 05:23

## 2020-01-01 RX ADMIN — NOREPINEPHRINE BITARTRATE 6 MCG/MIN: 1 INJECTION INTRAVENOUS at 09:30

## 2020-01-01 RX ADMIN — DEXMEDETOMIDINE HYDROCHLORIDE 0.8 MCG/KG/HR: 100 INJECTION, SOLUTION INTRAVENOUS at 03:30

## 2020-01-01 RX ADMIN — SODIUM BICARBONATE 50 MEQ: 84 INJECTION, SOLUTION INTRAVENOUS at 16:41

## 2020-01-01 RX ADMIN — VASOPRESSIN 0.03 UNITS/MIN: 20 INJECTION INTRAVENOUS at 11:16

## 2020-01-01 RX ADMIN — Medication 100 MG: at 05:06

## 2020-01-01 RX ADMIN — PREDNISOLONE 39.9 MG: 15 SOLUTION ORAL at 05:21

## 2020-01-01 RX ADMIN — DEXMEDETOMIDINE HYDROCHLORIDE 1 MCG/KG/HR: 100 INJECTION, SOLUTION INTRAVENOUS at 04:00

## 2020-01-01 RX ADMIN — HYDROCORTISONE SODIUM SUCCINATE 50 MG: 100 INJECTION, POWDER, FOR SOLUTION INTRAMUSCULAR; INTRAVENOUS at 23:37

## 2020-01-01 RX ADMIN — HYDROCORTISONE SODIUM SUCCINATE 50 MG: 100 INJECTION, POWDER, FOR SOLUTION INTRAMUSCULAR; INTRAVENOUS at 05:36

## 2020-01-01 RX ADMIN — LACTULOSE 30 ML: 20 SOLUTION ORAL at 05:36

## 2020-01-01 RX ADMIN — SODIUM BICARBONATE: 84 INJECTION, SOLUTION INTRAVENOUS at 16:15

## 2020-01-01 RX ADMIN — SODIUM CHLORIDE 8 MG/HR: 9 INJECTION, SOLUTION INTRAVENOUS at 12:00

## 2020-01-01 RX ADMIN — MIDODRINE HYDROCHLORIDE 15 MG: 5 TABLET ORAL at 22:51

## 2020-01-01 RX ADMIN — MUPIROCIN 1 APPLICATION: 20 OINTMENT TOPICAL at 18:22

## 2020-01-01 RX ADMIN — INSULIN HUMAN 5 UNITS: 100 INJECTION, SOLUTION PARENTERAL at 13:53

## 2020-01-01 RX ADMIN — VASOPRESSIN 0.03 UNITS/MIN: 20 INJECTION INTRAVENOUS at 23:15

## 2020-01-01 RX ADMIN — VASOPRESSIN 0.03 UNITS/MIN: 20 INJECTION INTRAVENOUS at 10:41

## 2020-01-01 RX ADMIN — RIFAXIMIN 550 MG: 550 TABLET ORAL at 05:21

## 2020-01-01 RX ADMIN — MORPHINE SULFATE 2 MG: 4 INJECTION INTRAVENOUS at 08:15

## 2020-01-01 RX ADMIN — RIFAXIMIN 550 MG: 550 TABLET ORAL at 17:50

## 2020-01-01 RX ADMIN — VASOPRESSIN 0.03 UNITS/MIN: 20 INJECTION INTRAVENOUS at 22:58

## 2020-01-01 ASSESSMENT — COGNITIVE AND FUNCTIONAL STATUS - GENERAL
TOILETING: TOTAL
TURNING FROM BACK TO SIDE WHILE IN FLAT BAD: UNABLE
MOVING FROM LYING ON BACK TO SITTING ON SIDE OF FLAT BED: UNABLE
HELP NEEDED FOR BATHING: A LOT
WALKING IN HOSPITAL ROOM: TOTAL
DRESSING REGULAR UPPER BODY CLOTHING: A LOT
CLIMB 3 TO 5 STEPS WITH RAILING: TOTAL
SUGGESTED CMS G CODE MODIFIER MOBILITY: CM
PERSONAL GROOMING: A LITTLE
SUGGESTED CMS G CODE MODIFIER DAILY ACTIVITY: CL
SUGGESTED CMS G CODE MODIFIER MOBILITY: CN
STANDING UP FROM CHAIR USING ARMS: TOTAL
MOVING TO AND FROM BED TO CHAIR: UNABLE
WALKING IN HOSPITAL ROOM: TOTAL
HELP NEEDED FOR BATHING: TOTAL
PERSONAL GROOMING: A LOT
DRESSING REGULAR LOWER BODY CLOTHING: A LOT
STANDING UP FROM CHAIR USING ARMS: TOTAL
SUGGESTED CMS G CODE MODIFIER DAILY ACTIVITY: CM
EATING MEALS: A LOT
EATING MEALS: A LITTLE
MOVING FROM LYING ON BACK TO SITTING ON SIDE OF FLAT BED: UNABLE
TOILETING: TOTAL
MOBILITY SCORE: 8
MOBILITY SCORE: 6
DAILY ACTIVITIY SCORE: 13
TURNING FROM BACK TO SIDE WHILE IN FLAT BAD: A LITTLE
DAILY ACTIVITIY SCORE: 8
DRESSING REGULAR LOWER BODY CLOTHING: TOTAL
CLIMB 3 TO 5 STEPS WITH RAILING: TOTAL
MOVING TO AND FROM BED TO CHAIR: UNABLE
DRESSING REGULAR UPPER BODY CLOTHING: TOTAL

## 2020-01-01 ASSESSMENT — ENCOUNTER SYMPTOMS
BRUISES/BLEEDS EASILY: 0
NAUSEA: 0
FEVER: 0
NAUSEA: 0
EYE DISCHARGE: 0
WEIGHT LOSS: 1
SEIZURES: 0
EYES NEGATIVE: 1
SINUS PAIN: 0
FOCAL WEAKNESS: 0
COUGH: 0
DIZZINESS: 0
MUSCULOSKELETAL NEGATIVE: 1
VOMITING: 0
HEADACHES: 1
CHILLS: 0
SHORTNESS OF BREATH: 1
HEARTBURN: 0
HEMOPTYSIS: 0
BLOOD IN STOOL: 1
SHORTNESS OF BREATH: 0
COUGH: 1
RESPIRATORY NEGATIVE: 1
ABDOMINAL PAIN: 1
EYE DISCHARGE: 0
SHORTNESS OF BREATH: 0
HEADACHES: 0
DIARRHEA: 0
RESPIRATORY NEGATIVE: 1
SEIZURES: 0
COUGH: 0
HEADACHES: 1
FEVER: 0
ABDOMINAL PAIN: 0
NAUSEA: 0
BLOOD IN STOOL: 0
COUGH: 0
DIARRHEA: 1
FEVER: 0
EYE PAIN: 0
VOMITING: 1
ABDOMINAL PAIN: 0
ROS SKIN COMMENTS: JAUNDICED APPEARING
FLANK PAIN: 0
CHILLS: 0
DIARRHEA: 0
ABDOMINAL PAIN: 0
HEARTBURN: 0
NERVOUS/ANXIOUS: 0
SORE THROAT: 0
CONSTIPATION: 0
FOCAL WEAKNESS: 0
BACK PAIN: 0
ABDOMINAL PAIN: 1
SHORTNESS OF BREATH: 0
SPUTUM PRODUCTION: 0
WEAKNESS: 1
DIARRHEA: 0
CHILLS: 0
SEIZURES: 0
SORE THROAT: 0
DIARRHEA: 1
FEVER: 0
FOCAL WEAKNESS: 0
BLOOD IN STOOL: 0
CARDIOVASCULAR NEGATIVE: 1
PALPITATIONS: 0
NERVOUS/ANXIOUS: 0
WEAKNESS: 1
NERVOUS/ANXIOUS: 0
ABDOMINAL PAIN: 1
WEAKNESS: 1
NAUSEA: 0
COUGH: 0
ABDOMINAL PAIN: 1
MYALGIAS: 0
MEMORY LOSS: 0
MEMORY LOSS: 0
SINUS PAIN: 0
CHILLS: 0
ABDOMINAL PAIN: 0
DOUBLE VISION: 0
BRUISES/BLEEDS EASILY: 0
FEVER: 0
CHILLS: 0
FEVER: 0
WEAKNESS: 1
BACK PAIN: 0
BRUISES/BLEEDS EASILY: 0
SHORTNESS OF BREATH: 1
NAUSEA: 0
BACK PAIN: 0
NECK PAIN: 0
COUGH: 0
MYALGIAS: 0
EYE PAIN: 0
NAUSEA: 0
BLOOD IN STOOL: 0
FEVER: 0
ABDOMINAL PAIN: 1
DIARRHEA: 0
SHORTNESS OF BREATH: 0
DIARRHEA: 0
HEARTBURN: 1
SHORTNESS OF BREATH: 0
VOMITING: 0
CONSTIPATION: 0
BLOOD IN STOOL: 0
ABDOMINAL PAIN: 1
NERVOUS/ANXIOUS: 0
DEPRESSION: 0
MYALGIAS: 0
FOCAL WEAKNESS: 0
VOMITING: 0
DIARRHEA: 0
MUSCULOSKELETAL NEGATIVE: 1
VOMITING: 0
LOSS OF CONSCIOUSNESS: 0
DIAPHORESIS: 0
PALPITATIONS: 0
SHORTNESS OF BREATH: 0
SEIZURES: 0
SORE THROAT: 0
HEARTBURN: 1
WEIGHT LOSS: 1
SHORTNESS OF BREATH: 1
VOMITING: 0
EYE PAIN: 0
CONSTIPATION: 0
BRUISES/BLEEDS EASILY: 0
SPEECH CHANGE: 1
NAUSEA: 1
EYES NEGATIVE: 1
VOMITING: 0
WHEEZING: 0
SEIZURES: 0
HEARTBURN: 0
FOCAL WEAKNESS: 0
CLAUDICATION: 0
SEIZURES: 0
SINUS PAIN: 0
ABDOMINAL PAIN: 0
CARDIOVASCULAR NEGATIVE: 1
BACK PAIN: 0
PHOTOPHOBIA: 0
CONSTIPATION: 0
WEAKNESS: 1
CHILLS: 0
COUGH: 0

## 2020-01-01 ASSESSMENT — LIFESTYLE VARIABLES
AUDITORY DISTURBANCES: NOT PRESENT
VISUAL DISTURBANCES: NOT PRESENT
HEADACHE, FULLNESS IN HEAD: NOT PRESENT
TOTAL SCORE: 4
EVER HAD A DRINK FIRST THING IN THE MORNING TO STEADY YOUR NERVES TO GET RID OF A HANGOVER: YES
TOTAL SCORE: 4
AGITATION: NORMAL ACTIVITY
HAVE YOU EVER FELT YOU SHOULD CUT DOWN ON YOUR DRINKING: YES
AUDITORY DISTURBANCES: NOT PRESENT
ANXIETY: MILDLY ANXIOUS
ORIENTATION AND CLOUDING OF SENSORIUM: ORIENTED AND CAN DO SERIAL ADDITIONS
HAVE PEOPLE ANNOYED YOU BY CRITICIZING YOUR DRINKING: YES
ORIENTATION AND CLOUDING OF SENSORIUM: ORIENTED AND CAN DO SERIAL ADDITIONS
SUBSTANCE_ABUSE: 1
EVER FELT BAD OR GUILTY ABOUT YOUR DRINKING: YES
TREMOR: NO TREMOR
VISUAL DISTURBANCES: NOT PRESENT
ANXIETY: NO ANXIETY (AT EASE)
HAVE PEOPLE ANNOYED YOU BY CRITICIZING YOUR DRINKING: YES
VISUAL DISTURBANCES: NOT PRESENT
NAUSEA AND VOMITING: NO NAUSEA AND NO VOMITING
EVER HAD A DRINK FIRST THING IN THE MORNING TO STEADY YOUR NERVES TO GET RID OF A HANGOVER: YES
HEADACHE, FULLNESS IN HEAD: NOT PRESENT
PAROXYSMAL SWEATS: NO SWEAT VISIBLE
HEADACHE, FULLNESS IN HEAD: NOT PRESENT
EVER_SMOKED: YES
TREMOR: NO TREMOR
HAVE YOU EVER FELT YOU SHOULD CUT DOWN ON YOUR DRINKING: YES
TOTAL SCORE: 4
PAROXYSMAL SWEATS: NO SWEAT VISIBLE
TREMOR: NO TREMOR
PAROXYSMAL SWEATS: NO SWEAT VISIBLE
DO YOU DRINK ALCOHOL: YES
DO YOU DRINK ALCOHOL: YES
TOTAL SCORE: 0
ANXIETY: NO ANXIETY (AT EASE)
SUBSTANCE_ABUSE: 1
AUDITORY DISTURBANCES: NOT PRESENT
EVER FELT BAD OR GUILTY ABOUT YOUR DRINKING: YES
TOTAL SCORE: 1
TOTAL SCORE: 4
TOTAL SCORE: 4
TOTAL SCORE: 0
CONSUMPTION TOTAL: INCOMPLETE
AGITATION: NORMAL ACTIVITY
TOTAL SCORE: 4
NAUSEA AND VOMITING: NO NAUSEA AND NO VOMITING
ORIENTATION AND CLOUDING OF SENSORIUM: ORIENTED AND CAN DO SERIAL ADDITIONS
AGITATION: NORMAL ACTIVITY
SUBSTANCE_ABUSE: 1
CONSUMPTION TOTAL: INCOMPLETE
NAUSEA AND VOMITING: NO NAUSEA AND NO VOMITING
DOES PATIENT WANT TO TALK TO SOMEONE ABOUT QUITTING: YES
DOES PATIENT WANT TO STOP DRINKING: YES

## 2020-01-01 ASSESSMENT — ACTIVITIES OF DAILY LIVING (ADL): TOILETING: INDEPENDENT

## 2020-01-01 ASSESSMENT — PULMONARY FUNCTION TESTS
FVC: .8
FVC: 0
FVC: .5
EPAP_CMH2O: 10
FVC: .69
FVC: .33
EPAP_CMH2O: 10
FVC: 0

## 2020-01-01 ASSESSMENT — GAIT ASSESSMENTS: GAIT LEVEL OF ASSIST: UNABLE TO PARTICIPATE

## 2020-01-01 ASSESSMENT — COPD QUESTIONNAIRES
HAVE YOU SMOKED AT LEAST 100 CIGARETTES IN YOUR ENTIRE LIFE: YES
COPD SCREENING SCORE: 3
DURING THE PAST 4 WEEKS HOW MUCH DID YOU FEEL SHORT OF BREATH: NONE/LITTLE OF THE TIME
DO YOU EVER COUGH UP ANY MUCUS OR PHLEGM?: NO/ONLY WITH OCCASIONAL COLDS OR INFECTIONS

## 2020-01-20 PROBLEM — E87.20 LACTIC ACIDOSIS: Status: ACTIVE | Noted: 2020-01-01

## 2020-01-20 PROBLEM — R79.89 ELEVATED TROPONIN: Status: ACTIVE | Noted: 2020-01-01

## 2020-01-20 PROBLEM — R57.8 HEMORRHAGIC SHOCK (HCC): Status: ACTIVE | Noted: 2020-01-01

## 2020-01-20 PROBLEM — E87.20 METABOLIC ACIDOSIS: Status: ACTIVE | Noted: 2020-01-01

## 2020-01-20 PROBLEM — D68.9 COAGULOPATHY (HCC): Status: ACTIVE | Noted: 2020-01-01

## 2020-01-20 PROBLEM — K92.2 UPPER GI BLEED: Status: ACTIVE | Noted: 2020-01-01

## 2020-01-20 PROBLEM — F10.10 ALCOHOL ABUSE: Status: ACTIVE | Noted: 2020-01-01

## 2020-01-20 PROBLEM — A41.9 SEPTIC SHOCK (HCC): Status: ACTIVE | Noted: 2020-01-01

## 2020-01-20 PROBLEM — N17.9 AKI (ACUTE KIDNEY INJURY) (HCC): Status: ACTIVE | Noted: 2020-01-01

## 2020-01-20 PROBLEM — E87.5 HYPERKALEMIA: Status: ACTIVE | Noted: 2020-01-01

## 2020-01-20 PROBLEM — R79.89 INCREASED AMMONIA LEVEL: Status: ACTIVE | Noted: 2020-01-01

## 2020-01-20 PROBLEM — R65.21 SEPTIC SHOCK (HCC): Status: ACTIVE | Noted: 2020-01-01

## 2020-01-20 NOTE — ASSESSMENT & PLAN NOTE
Increasing lactate today unclear etiology with increased pressor requirement.  We will reevaluate fluid status and infectious disease status.  Elevated lactate is likely partly related least to poor liver function and inability to metabolize lactate to pyruvate.

## 2020-01-20 NOTE — ASSESSMENT & PLAN NOTE
Hepatorenal syndrome with renal failure, potential for dialysis today given increasing lactate and metabolic acidosis per renal.  Poor prognosis.  % of patients with acute hepatorenal syndrome requiring dialysis who are not candidates for liver transplant, as is the case in this patient, will die on ICU admission.

## 2020-01-20 NOTE — PROCEDURES
Central Line Insertion  Date/Time: 1/20/2020 3:40 PM  Performed by: Keith Montenegro D.O.  Authorized by: Keith Montenegro D.O.     Consent:     Consent obtained:  Emergent situation and verbal    Consent given by:  Patient and spouse    Risks discussed:  Incorrect placement, infection, bleeding, arterial puncture, nerve damage and pneumothorax    Alternatives discussed:  No treatment  Universal protocol:     Immediately prior to procedure, a time out was called: yes    Pre-procedure details:     Hand hygiene: Hand hygiene performed prior to insertion      Sterile barrier technique: All elements of maximal sterile technique followed      Skin preparation:  2% chlorhexidine    Skin preparation agent: Skin preparation agent completely dried prior to procedure    Anesthesia:     Anesthesia method:  Local infiltration    Local anesthetic:  Lidocaine 1% w/o epi  Procedure details:     Location:  R internal jugular    Site selection rationale:  Dialysis catheter    Patient position:  Trendelenburg    Procedural supplies:  Triple lumen    Catheter size: 13 Fr Trialysis cath.    Landmarks identified: yes      Ultrasound guidance: yes      Sterile ultrasound techniques: Sterile gel and sterile probe covers were used      Number of attempts:  1    Successful placement: yes    Post-procedure details:     Post-procedure:  Dressing applied and line sutured    Assessment:  Blood return through all ports and free fluid flow (pending CXR)    Patient tolerance of procedure:  Tolerated well, no immediate complications

## 2020-01-20 NOTE — ED NOTES
Med Rec completed per patient's S/O  Allergies reviewed  No ORAL antibiotics in last 14 days

## 2020-01-20 NOTE — ED PROVIDER NOTES
ED Provider Note    ER PROVIDER NOTE      CHIEF COMPLAINT  Chief Complaint   Patient presents with   • ALOC       HPI  Dyan Guevara is a 54 y.o. female who presents to the emergency department brought in by EMS with altered mental status.  Per EMS as well as her boyfriend who lives with her, she stopped drinking approximately 1/2 weeks ago, since that point she has had difficulty with nausea and vomiting and over the last few days has been more weak than usual.  Her boyfriend did not notice any seizures.  He had only seen a small amount of blood in her emesis over the last week although today when EMS found her there was large amount of blood in her oropharynx as well as bright red blood per rectum.  Other history is unobtainable due to patient's current clinical status.    Per EMS she was moaning and minimally responsive on their arrival    REVIEW OF SYSTEMS  Pertinent positives include altered mental status, GI bleed. Pertinent negatives include no seizure. See HPI for details. All other systems reviewed and are negative.    PAST MEDICAL HISTORY   has a past medical history of Sciatica.    SURGICAL HISTORY  patient denies any surgical history    FAMILY HISTORY  No family history on file.    SOCIAL HISTORY  Social History     Socioeconomic History   • Marital status: Single     Spouse name: Not on file   • Number of children: Not on file   • Years of education: Not on file   • Highest education level: Not on file   Occupational History   • Not on file   Social Needs   • Financial resource strain: Not on file   • Food insecurity:     Worry: Not on file     Inability: Not on file   • Transportation needs:     Medical: Not on file     Non-medical: Not on file   Tobacco Use   • Smoking status: Current Every Day Smoker     Packs/day: 0.80     Types: Cigarettes   • Smokeless tobacco: Never Used   Substance and Sexual Activity   • Alcohol use: Yes     Comment: x2 drinks a day   • Drug use: No   • Sexual activity: Not  "Currently     Partners: Female   Lifestyle   • Physical activity:     Days per week: Not on file     Minutes per session: Not on file   • Stress: Not on file   Relationships   • Social connections:     Talks on phone: Not on file     Gets together: Not on file     Attends Yarsani service: Not on file     Active member of club or organization: Not on file     Attends meetings of clubs or organizations: Not on file     Relationship status: Not on file   • Intimate partner violence:     Fear of current or ex partner: Not on file     Emotionally abused: Not on file     Physically abused: Not on file     Forced sexual activity: Not on file   Other Topics Concern   • Not on file   Social History Narrative   • Not on file      Social History     Substance and Sexual Activity   Drug Use No       CURRENT MEDICATIONS  Home Medications     Reviewed by Leo Dia (Pharmacy Tech) on 01/20/20 at 1325  Med List Status: Complete   Medication Last Dose Status        Patient Anthony Taking any Medications                       ALLERGIES  No Known Allergies    PHYSICAL EXAM  VITAL SIGNS: /61   Pulse (!) 101   Temp (!) 32.8 °C (91 °F) (Bladder)   Resp 18   Ht 1.676 m (5' 6\")   Wt 65.8 kg (145 lb)   SpO2 99%   BMI 23.40 kg/m²   Pulse ox interpretation: I interpret this pulse ox as normal.    Constitutional: Ill-appearing, moaning  HENT: No signs of trauma, Bilateral external ears normal, Nose normal.   Eyes: Pupils are equal and reactive, Conjunctiva normal, scleral icterus noted  Neck: Normal range of motion, No tenderness, Supple, No stridor.   Lymphatic: No lymphadenopathy noted.   Cardiovascular: Tachycardic, regular, no murmurs.   Thorax & Lungs: Normal breath sounds, No respiratory distress, No wheezing, No chest tenderness.  Tachypneic  Abdomen: Bowel sounds normal, Soft, No tenderness, No masses, No pulsatile masses. No peritoneal signs.  Skin: Warm, Dry, No erythema, No rash.  Jaundice  Back: No bony " tenderness, No CVA tenderness.   Extremities: Cool extremities but intact distal pulses, No edema, No tenderness, Musculoskeletal: Good range of motion in all major joints. No tenderness to palpation or major deformities noted.   Neurologic: Patient is moaning, confused, however after initial volume resuscitation, she is conversant, knows her name but is otherwise confused, no fasciculations, tongue, noted  psychiatric: Affect normal, Judgment normal, Mood normal.     DIAGNOSTIC STUDIES / PROCEDURES    EKG  Interpreted by me    Rhythm:  Normal sinus rhythm   Rate:74  Axis: normal  Intervals: Incomplete right bundle branch block  Ectopy: none  Conduction: normal  ST Segments: no acute change  T Waves: no acute change  Q Waves: none      LABS  Results for orders placed or performed during the hospital encounter of 01/20/20   CBC WITH DIFFERENTIAL   Result Value Ref Range    WBC 24.0 (H) 4.8 - 10.8 K/uL    RBC 1.14 (L) 4.20 - 5.40 M/uL    Hemoglobin 4.0 (LL) 12.0 - 16.0 g/dL    Hematocrit 13.3 (LL) 37.0 - 47.0 %    .7 (H) 81.4 - 97.8 fL    MCH 35.1 (H) 27.0 - 33.0 pg    MCHC 30.1 (L) 33.6 - 35.0 g/dL    RDW 86.9 (H) 35.9 - 50.0 fL    Platelet Count 215 164 - 446 K/uL    MPV 12.0 9.0 - 12.9 fL    Neutrophils-Polys 92.20 (H) 44.00 - 72.00 %    Lymphocytes 2.50 (L) 22.00 - 41.00 %    Monocytes 3.40 0.00 - 13.40 %    Eosinophils 0.00 0.00 - 6.90 %    Basophils 0.40 0.00 - 1.80 %    Immature Granulocytes 1.50 (H) 0.00 - 0.90 %    Nucleated RBC 2.30 /100 WBC    Neutrophils (Absolute) 22.13 (H) 2.00 - 7.15 K/uL    Lymphs (Absolute) 0.59 (L) 1.00 - 4.80 K/uL    Monos (Absolute) 0.81 0.00 - 0.85 K/uL    Eos (Absolute) 0.01 0.00 - 0.51 K/uL    Baso (Absolute) 0.10 0.00 - 0.12 K/uL    Immature Granulocytes (abs) 0.36 (H) 0.00 - 0.11 K/uL    NRBC (Absolute) 0.56 K/uL    Hypochromia 1+     Anisocytosis 2+     Macrocytosis 2+     Microcytosis 1+    COMP METABOLIC PANEL   Result Value Ref Range    Sodium 124 (L) 135 - 145  mmol/L    Potassium 6.9 (HH) 3.6 - 5.5 mmol/L    Chloride 77 (L) 96 - 112 mmol/L    Co2 7 (LL) 20 - 33 mmol/L    Anion Gap 40.0 (H) 0.0 - 11.9    Glucose 72 65 - 99 mg/dL    Bun 206 (HH) 8 - 22 mg/dL    Creatinine 13.41 (HH) 0.50 - 1.40 mg/dL    Calcium 5.0 (LL) 8.5 - 10.5 mg/dL    AST(SGOT) 145 (H) 12 - 45 U/L    ALT(SGPT) 23 2 - 50 U/L    Alkaline Phosphatase 212 (H) 30 - 99 U/L    Total Bilirubin 7.9 (H) 0.1 - 1.5 mg/dL    Albumin 2.3 (L) 3.2 - 4.9 g/dL    Total Protein 6.3 6.0 - 8.2 g/dL    Globulin 4.0 (H) 1.9 - 3.5 g/dL    A-G Ratio 0.6 g/dL   LACTIC ACID   Result Value Ref Range    Lactic Acid 10.0 (HH) 0.5 - 2.0 mmol/L   MAGNESIUM   Result Value Ref Range    Magnesium 2.8 (H) 1.5 - 2.5 mg/dL   PHOSPHORUS   Result Value Ref Range    Phosphorus 15.4 (HH) 2.5 - 4.5 mg/dL   TROPONIN   Result Value Ref Range    Troponin T 38 (H) 6 - 19 ng/L   APTT   Result Value Ref Range    APTT 63.4 (H) 24.7 - 36.0 sec   PROTHROMBIN TIME   Result Value Ref Range    PT 23.6 (H) 12.0 - 14.6 sec    INR 2.03 (H) 0.87 - 1.13   URINALYSIS   Result Value Ref Range    Color DK Yellow     Character Turbid (A)     Specific Gravity 1.024 <1.035    Ph 7.5 5.0 - 8.0    Glucose Negative Negative mg/dL    Ketones Negative Negative mg/dL    Protein 300 (A) Negative mg/dL    Bilirubin Large (A) Negative    Urobilinogen, Urine 1.0 Negative    Nitrite Positive (A) Negative    Leukocyte Esterase Small (A) Negative    Occult Blood Trace (A) Negative    Micro Urine Req Microscopic    COD - Adult (Type and Screen)   Result Value Ref Range    ABO Grouping Only O     Rh Grouping Only POS     Antibody Screen-Cod NEG    UN-XM'D RBC   Result Value Ref Range    Component R       R99                 Red Cells, LR       [id number]   issued       01/20/20   12:38      Product Type R99     Dispense Status issued     Unit Number (Barcoded) [id number]     Product Code (Barcoded) S5838O28     Blood Type (Barcoded) 9500     Component R       R99                  Red Cells, LR       [id number]   released     20   13:32      Product Type R99     Dispense Status /Released     Unit Number (Barcoded) [id number]     Product Code (Barcoded) D7231V43     Blood Type (Barcoded) 9500     Component R       R7                  Red Blood Cells7    [id number]   issued       20   13:35      Product Type Red Blood Cells LR Pheresis     Dispense Status issued     Unit Number (Barcoded) [id number]     Product Code (Barcoded) J4423W20     Blood Type (Barcoded) 9500    LACTIC ACID   Result Value Ref Range    Lactic Acid 13.2 (HH) 0.5 - 2.0 mmol/L   AMMONIA   Result Value Ref Range    Ammonia 73 (H) 11 - 45 umol/L   ARTERIAL BLOOD GAS w/ O2 (LAB)   Result Value Ref Range    Ph 7.24 (LL) 7.40 - 7.50    Pco2 16.5 (L) 26.0 - 37.0 mmHg    Po2 198.1 (H) 64.0 - 87.0 mmHg    O2 Saturation 98.8 93.0 - 99.0 %    Hco3 7 (L) 17 - 25 mmol/L    Base Excess -19 (L) -4 - 3 mmol/L    Body Temp see below Centigrade    O2 Therapy 4.0 2.0 - 10.0 L/min   URINE MICROSCOPIC (W/UA)   Result Value Ref Range    WBC 10-20 (A) /hpf    RBC 0-2 /hpf    Bacteria Many (A) None /hpf    Epithelial Cells Few /hpf   ESTIMATED GFR   Result Value Ref Range    GFR If  3 (A) >60 mL/min/1.73 m 2    GFR If Non African American 3 (A) >60 mL/min/1.73 m 2   ABO Rh Confirm   Result Value Ref Range    ABO Rh Confirm O POS    PERIPHERAL SMEAR REVIEW   Result Value Ref Range    Peripheral Smear Review see below    PLATELET ESTIMATE   Result Value Ref Range    Plt Estimation Normal    MORPHOLOGY   Result Value Ref Range    RBC Morphology Present     Polychromia 2+     Poikilocytosis 1+     Ovalocytes 1+     Target Cells 1+     Tear Drop Cells 1+     Stomatocytes 1+     Rouleaux Slight    DIFFERENTIAL COMMENT   Result Value Ref Range    Comments-Diff see below        All labs reviewed by me.    RADIOLOGY  DX-CHEST-PORTABLE (1 VIEW)   Final Result      No acute cardiac or pulmonary abnormalities  are identified.      Right IJ central line in place with tip at the expected location of the SVC/right atrial junction.      CT-HEAD W/O    (Results Pending)     The radiologist's interpretation of all radiological studies have been reviewed by me.    COURSE & MEDICAL DECISION MAKING  Nursing notes, GARCÍA MALDONADOHx reviewed in chart.    12:39 PM Patient seen and examined at bedside immediately on arrival of EMS, ordered for patient to be placed on the defibrillator with pads, ECG, calcium, bicarb, empiric O- blood.   Orders for CBC, type and cross, CMP, coags, ammonia, lactate, chest x-ray, CT head, urinalysis  Continuous management evaluation at bedside, her blood pressure seems to be responding to the fluids and empiric blood, on her initial ECG there is no obvious arrhythmia    Ordered for levo fed to bedside, will start on drip peripherally while I place the central line    I have explained to the patient representative the risks and benefits of transfusion of blood products.  This includes, as appropriate, the risk of mild allergic reaction, hemolytic reaction, transfusion-associated lung injury, febrile reactions, circulatory or iron overload, and infection.    We discussed possible alternatives and their risks, I believe the patient representative understands the risks and benefits and was able to express understanding.      1:10 PM  CENTRAL LINE PROCEDURE NOTE: (with ultrasound guidance)  The patient was consented all risks benefits and alternatives were discussed.  LOCATION:[facility]  POSITION:  trendelenburg.  PROCEDURE NOTE:  Sterile prep, wearing cap, mask, sterile gloves and sterile gown, and drape protocols with full sterile body drape were used. Sterile probe cover and sterile gel used for ultrasound. The vascular triangle was identified with ultrasound. Local anesthesia 1%  lidocaine 3cc was infiltrated with a 27 gauge needle. Using ultrasound guidance, the internal jugular was canulated with good flow. At no  point in the procedure was air aspirated with needle introduction. Wire was passed without difficulty. Dilation was performed. The central line catheter was passed over the wire without difficulty. The line was secured in place using 4-0 nylon suture and sterile dressing and biopatch applied. Air in the various ports was evacuated and the lines were flushed by me. Patient tolerated the procedure well there were no complications. A chest x-ray was ordered prior to use of the IV.    POST-PROCEDURE CXR:  There is no pneumothorax present. The tip of the line is well positioned at the SVC /RA junction.      Patient's hemoglobin has returned to 4.0 as well as INR above 2, boarded for additional unit of blood as well as FFP    With the results of her metabolic panel additionally ordered for another gram of calcium chloride as well as bicarb, will start on insulin dextrose    Discussed case with Dr. Montenegro for admission to the ICU, as well as Dr. Pardo for admission    Discussed case with Dr. Day from nephrology who will arrange for dialysis    Discussed case Dr. Ford from GI, at this point she will need continued medical management and resuscitation prior to scoping    The total critical care time spent on this patient was 40 minutes, resuscitating patient, speaking with admitting physician, and interpreting test results. This 40 minutes is exclusive of separately billable procedures.    Patient was given IV fluids due to hypotension and shock, oral fluids would not be appropriate or rapid enough, reevaluation she was improved    Decision Making:  This is a 54 y.o. female presenting with altered mental status as well as apparent GI bleed.  Patient arrived, americo-arrest, likely from her hemorrhagic shock as well as her hyperkalemia.  She was given empiric treatment given her presentation with calcium, bicarb, as well as O- blood and her blood pressure did improve, as her subsequent results did return, additional  management of both her hemorrhagic shock as well as hyperkalemia and renal failure as above.  She was given a total of 2 units of blood in the emergency department with water, ICU as well as FFP, additionally was given 2 g of calcium chloride, insulin and dextrose, ceftriaxone, Levophed drip, Protonix, octreotide and bicarb.      Patient is quite ill and is admitted in critical condition      FINAL IMPRESSION  1. Hemorrhagic shock (HCC)    2. Gastrointestinal hemorrhage, unspecified gastrointestinal hemorrhage type    3. Hyperkalemia    4. Renal failure, unspecified chronicity    5. Anemia, unspecified type    6. Lactic acidosis         The note accurately reflects work and decisions made by me.  Noé Mark M.D.  1/20/2020  2:47 PM

## 2020-01-20 NOTE — PROGRESS NOTES
Pt transferred from Red 08 to S137 on monitor and transport zoll. Stop at CT for head CT prior to transfer.    Dr. Montenegro at bedside to place dialysis cath.

## 2020-01-20 NOTE — ED NOTES
Critical labs reports from . Reports possible contaminations,   k 6.9  CO2- 7    Creat 13.41  Calcium 5  Phos- 15.4

## 2020-01-20 NOTE — CONSULTS
Date of service: 1/20/2020    Attending Physician: Noé Mark M.D.    History of Present Illness: Dyan Guevara is a 54 y.o. female here for GIB and altered mental status and abdominal pain.    We were called by Dr. Mark ER physician to consult emergently on this 54 year old patient with alcohol use above the sensible limits.    She has stopped drinking ten days ago or so according to significant other. Over past days he helped her to the bathroom and has seen small amounts of rectal bleeding (table spoons)  possibly true  hematochezia and also some hematemesis (again ? Teaspoons?).    She has gotten progressively weaker and developed altered mental status.     In the ER her HGB was ~ 4.    She has acute renal failure with a creatinine of 13.     She is profoundly hyperkalemic.  Lactic acid is elevated as well and PH is c/w acidosis.    ICU team and renal are contemplating best strategy for the ARF. She is making minimal urine per Velez. Phospfor is huseyin high and calcium very low. Sodium is around 124. A central dialysis catheter is being scheduled and she will be going to dialysis.    After receiving a few liters she has normal vitals including SBP in 100's, 100 percent oxygen sat on 3 liters, and is not particularly tachycardic. There is no overt hematochezia currently.    Bedside US per ICU demonstrated a normal cardiac EF and absence of ascites.     The patient complains of epigastric tenderness but there is no ascites clinically, nor guarding, nor rigidity.    Given her mental status an exhaustive ROS is not feasible.    Review of Systems:    Review of Systems   Unable to perform ROS: Acuity of condition   Constitutional: Positive for malaise/fatigue.   HENT: Positive for nosebleeds.         Trace dried  blood around corners of mouth   Respiratory: Positive for shortness of breath.    Gastrointestinal: Positive for abdominal pain and blood in stool.   Genitourinary:        Oliguric  ARF   Skin:         "Jaundiced appearing   Neurological: Positive for speech change.        Moaning, difficult to orient awake   Psychiatric/Behavioral: Positive for substance abuse.       Current Facility-Administered Medications   Medication Dose Route Frequency Provider Last Rate Last Dose   • pantoprazole (PROTONIX) 80 mg in  mL Infusion  8 mg/hr Intravenous Continuous Noé Mark M.D.   Stopped at 01/20/20 1429   • octreotide (SANDOSTATIN) 1,250 mcg in  mL Infusion  50 mcg/hr Intravenous Continuous Noé Mark M.D. 10 mL/hr at 01/20/20 1300 50 mcg/hr at 01/20/20 1300   • phenylephrine (JOSÉ MIGUEL-SYNEPHRINE) 100 mcg/mL inj (IV Push Syringe) 200 mcg  200 mcg Intravenous Q15 MIN PRN Noé Mark M.D.   200 mcg at 01/20/20 1243   • norepinephrine (LEVOPHED) 8 mg in  mL Infusion  0-30 mcg/min Intravenous Continuous Noé Mark M.D. 18.8 mL/hr at 01/20/20 1330 10 mcg/min at 01/20/20 1330   • sodium bicarbonate 8.4 % 150 mEq in D5W 1,000 mL infusion   Intravenous Continuous Siri Day D.O.         No current outpatient medications on file.       Social History     Tobacco Use   • Smoking status: Current Every Day Smoker     Packs/day: 0.80     Types: Cigarettes   • Smokeless tobacco: Never Used   Substance Use Topics   • Alcohol use: Yes     Comment: x2 drinks a day   • Drug use: No        Past Medical History:   Diagnosis Date   • Sciatica        No past surgical history on file.    Allergies: Patient has no known allergies.    No family history on file.    Vitals:    01/20/20 1231 01/20/20 1233 01/20/20 1242 01/20/20 1245   Height: 1.676 m (5' 6\")      Weight: 65.8 kg (145 lb)      Weight % change since last entry.: 0 %      BP:  (!) 65/49 (!) 80/44 (!) 81/67   Pulse:  71 88 (!) 103   BMI (Calculated): 23.4      Resp:  18 19 15   Temp:  (!) 35.8 °C (96.4 °F)     TempSrc:        01/20/20 1250 01/20/20 1256 01/20/20 1300 01/20/20 1305   Height:       Weight:       Weight % change since last entry.:   "     BP:  103/55 (!) 87/55 (!) 94/61   Pulse: 91 94 93 92   BMI (Calculated):       Resp: 19 (!) 24 (!) 24 (!) 21   Temp:       TempSrc:        01/20/20 1315 01/20/20 1325 01/20/20 1338 01/20/20 1350   Height:       Weight:       Weight % change since last entry.:       BP: (!) 97/58 (!) 95/72 100/64 104/52   Pulse: 93 81 88 91   BMI (Calculated):       Resp: 20 14 17 19   Temp:       TempSrc:        01/20/20 1405 01/20/20 1410 01/20/20 1415 01/20/20 1424   Height:       Weight:       Weight % change since last entry.:       BP: 100/54 101/56 110/74 104/61   Pulse: (!) 102 (!) 102 (!) 102 (!) 101   BMI (Calculated):       Resp: 18 18 16 18   Temp: (!) 32.8 °C (91 °F)      TempSrc: Bladder       01/20/20 1430 01/20/20 1436   Height:     Weight:     Weight % change since last entry.:     BP: 113/77 105/63   Pulse: 99 93   BMI (Calculated):     Resp: 19 19   Temp:     TempSrc:         Physical Examination:     Physical Exam   Constitutional: She appears distressed.   HENT:   Head: Atraumatic.   Eyes: Scleral icterus is present.   Neck: No tracheal deviation present.   Pulmonary/Chest: No stridor. She exhibits no tenderness.   some tachypnea   Abdominal: She exhibits no distension. There is tenderness. There is no rebound and no guarding.   Musculoskeletal:         General: No deformity or edema.      Comments: Callus feet severe   Neurological:   Awake  No obvious focal deficit   Skin: Skin is warm and dry.         Lab Results   Component Value Date/Time    PROTHROMBTM 23.6 (H) 01/20/2020 12:34 PM    INR 2.03 (H) 01/20/2020 12:34 PM      Lab Results   Component Value Date/Time    WBC 24.0 (H) 01/20/2020 12:34 PM    RBC 1.14 (L) 01/20/2020 12:34 PM    HEMOGLOBIN 4.0 (LL) 01/20/2020 12:34 PM    HEMATOCRIT 13.3 (LL) 01/20/2020 12:34 PM    .7 (H) 01/20/2020 12:34 PM    MCH 35.1 (H) 01/20/2020 12:34 PM    MCHC 30.1 (L) 01/20/2020 12:34 PM    MPV 12.0 01/20/2020 12:34 PM    NEUTSPOLYS 92.20 (H) 01/20/2020 12:34 PM     LYMPHOCYTES 2.50 (L) 01/20/2020 12:34 PM    MONOCYTES 3.40 01/20/2020 12:34 PM    EOSINOPHILS 0.00 01/20/2020 12:34 PM    BASOPHILS 0.40 01/20/2020 12:34 PM    HYPOCHROMIA 1+ 01/20/2020 12:34 PM    ANISOCYTOSIS 2+ 01/20/2020 12:34 PM      Lab Results   Component Value Date/Time    SODIUM 124 (L) 01/20/2020 12:34 PM    POTASSIUM 6.9 (HH) 01/20/2020 12:34 PM    CHLORIDE 77 (L) 01/20/2020 12:34 PM    CO2 7 (LL) 01/20/2020 12:34 PM    GLUCOSE 72 01/20/2020 12:34 PM     (HH) 01/20/2020 12:34 PM    CREATININE 13.41 (HH) 01/20/2020 12:34 PM      Recent Labs     01/20/20  1234 01/20/20  1251   ASTSGOT 145*  --    ALTSGPT 23  --    TBILIRUBIN 7.9*  --    ALKPHOSPHAT 212*  --    GLOBULIN 4.0*  --    INR 2.03*  --    AMMONIA  --  73*       Imaging:   DX-CHEST-PORTABLE (1 VIEW)   Final Result      No acute cardiac or pulmonary abnormalities are identified.      Right IJ central line in place with tip at the expected location of the SVC/right atrial junction.      CT-HEAD W/O    (Results Pending)   US-RENAL    (Results Pending)           Assessment and Plan:    Acute hemorrhagic anemia  Hgb 4  Profound macrocytosis   Tea / table spoon hematemesis and rectal bleeding per significant other  Altered mental status  Severe ARF  Severe electrolyte imbalance K ~ 6.9 Phos ~ 15 Creat 13  Alcoholism  Alcoholic hepatitis  Epigastric pain      Rec:  D/W ER physician Jas and Nickolas Rodarte of the ICU / hospitalist team  Stabilize prior to endoscopy per ICU  PPI and octeotride IV  Likely will nee medical and or renal dialysis for correction of electrolyte imbalance  Work up for alcoholic liver disease  Will need control of airway prior to endoscopy  With lactic acid elevation, WBC count elevation and alcoholic liver disease I would obtain abd US to assess for cirrhosis by abnormal liver contour and abdominal x-ray though suspicion for free air is on lower range  Would place NG tube to assess for ongoing active  bleeding  Would obtain iron studies on blood sample before any blood products were administered.

## 2020-01-20 NOTE — CONSULTS
"Critical Care Consultation    Date of consult: 1/20/2020    Referring Physician  Noé Mark M.D.    Reason for Consultation  EtOH abuse, GI bleed, GENESIS    History of Presenting Illness  54 y.o. female with hx of alcohol abuse, hx of recent pancreatitis, upper GI bleed 2 months ago, who presented 1/20/2020 with altered mental status and abd pain. Pt was actively drinking until 2 weeks ago where she quit \"cold turkey\" per aichachandu. History obtained from  and medical record. Since then, pt has been having N/V and in the past few days, pt developed progressive weakness and AMS. John reported small amount of blood in her emesis as well as in her stools. Pt was brought to ED. At ED, pt was hypotensive with BP in 60/40s. Also reported ED noted melanotic stools on the sheets and some blood around her mouth. Pt received 3L crystalloid fluid boluses. Lactate 13, creatinine 13, K 6.9. HCO3 7. Ceftriaxone started. Blood cultures obtained. INR 2.0, plt 215K    HyperK cocktail was given. Nephrology was consulted for emergent dialysis. Hgb 4.0, received 2U uncrossmatched PRBCs with 1U plat and 1U FFP. Per ED, pt's mental status is somewhat improved with fluids, but pt still moaning while I got there. Pt c/o epigastric pain. On 3L NC with sat >90%. SBP in 100s after fluid boluses.     Code Status  Full Code    Review of Systems  Review of Systems   Constitutional: Positive for malaise/fatigue. Negative for chills and fever.   HENT:        Dried lips  Dry mucosa   Respiratory: Negative for cough and shortness of breath.    Cardiovascular: Negative for chest pain, palpitations and leg swelling.   Gastrointestinal: Positive for nausea and vomiting. Negative for diarrhea.   Musculoskeletal: Negative for back pain and joint pain.   Skin: Negative for rash.        Telangiectasia noted on the upper chest   Neurological: Positive for weakness. Negative for seizures and headaches.   Psychiatric/Behavioral: The patient is not " nervous/anxious.    All other systems reviewed and are negative.      Past Medical History   has a past medical history of Sciatica.    Surgical History   has no past surgical history on file.    Family History  family history is not on file.    Social History   reports that she has been smoking cigarettes. She has been smoking about 0.80 packs per day. She has never used smokeless tobacco. She reports current alcohol use. She reports that she does not use drugs.    Medications  Home Medications     Reviewed by Leo Dia (Pharmacy Tech) on 01/20/20 at 1325  Med List Status: Complete   Medication Last Dose Status        Patient Anthony Taking any Medications                     Current Facility-Administered Medications   Medication Dose Route Frequency Provider Last Rate Last Dose   • pantoprazole (PROTONIX) 80 mg in  mL Infusion  8 mg/hr Intravenous Continuous Noé Mark M.D. 25 mL/hr at 01/20/20 1300 8 mg/hr at 01/20/20 1300   • octreotide (SANDOSTATIN) 1,250 mcg in  mL Infusion  50 mcg/hr Intravenous Continuous Noé Mark M.D.       • phenylephrine (JOSÉ MIGUEL-SYNEPHRINE) 100 mcg/mL inj (IV Push Syringe) 200 mcg  200 mcg Intravenous Q15 MIN PRN Noé Mark M.D.   200 mcg at 01/20/20 1243   • norepinephrine (LEVOPHED) 8 mg in  mL Infusion  0-30 mcg/min Intravenous Continuous Noé Mark M.D. 18.8 mL/hr at 01/20/20 1330 10 mcg/min at 01/20/20 1330   • insulin regular (HUMULIN R) injection 5 Units  5 Units Intravenous Once Noé Mark M.D.       • dextrose 50% (D50W) injection 50 mL  50 mL Intravenous Once Noé Mark M.D.       • thiamine (B-1) 500 mg in D5W 100 mL IVPB  500 mg Intravenous Once Noé Mark M.D.         No current outpatient medications on file.       Allergies  No Known Allergies    Vital Signs last 24 hours  Temp:  [35.8 °C (96.4 °F)] 35.8 °C (96.4 °F)  Pulse:  [] 88  Resp:  [14-24] 17  BP: ()/(44-72) 100/64  SpO2:  [87 %-100 %] 100  %    Physical Exam  Physical Exam  Vitals signs and nursing note reviewed.   Constitutional:       General: She is not in acute distress.     Appearance: She is obese. She is ill-appearing and toxic-appearing. She is not diaphoretic.      Comments: Alert, but moaning  Minimally following commands.    HENT:      Head: Normocephalic and atraumatic.      Mouth/Throat:      Mouth: Mucous membranes are dry.   Eyes:      General: Scleral icterus present.         Right eye: No discharge.         Left eye: No discharge.      Pupils: Pupils are equal, round, and reactive to light.   Neck:      Musculoskeletal: Neck supple.   Cardiovascular:      Rate and Rhythm: Normal rate and regular rhythm.      Pulses: Normal pulses.      Heart sounds: Normal heart sounds. No murmur.   Pulmonary:      Effort: No respiratory distress.      Breath sounds: Normal breath sounds. No wheezing, rhonchi or rales.      Comments: tachypneic  Abdominal:      General: There is no distension.      Palpations: Abdomen is soft.      Tenderness: There is no tenderness. There is no guarding or rebound.   Musculoskeletal:      Right lower leg: No edema.      Left lower leg: No edema.   Skin:     General: Skin is warm and dry.      Capillary Refill: Capillary refill takes less than 2 seconds.      Coloration: Skin is jaundiced.      Findings: No bruising, erythema or rash.      Comments: No mottling noted   Neurological:      General: No focal deficit present.      Mental Status: She is alert and oriented to person, place, and time.         Fluids    Intake/Output Summary (Last 24 hours) at 1/20/2020 1340  Last data filed at 1/20/2020 1336  Gross per 24 hour   Intake 200 ml   Output --   Net 200 ml       Laboratory  Recent Results (from the past 48 hour(s))   CBC WITH DIFFERENTIAL    Collection Time: 01/20/20 12:34 PM   Result Value Ref Range    WBC 24.0 (H) 4.8 - 10.8 K/uL    RBC 1.14 (L) 4.20 - 5.40 M/uL    Hemoglobin 4.0 (LL) 12.0 - 16.0 g/dL     Hematocrit 13.3 (LL) 37.0 - 47.0 %    .7 (H) 81.4 - 97.8 fL    MCH 35.1 (H) 27.0 - 33.0 pg    MCHC 30.1 (L) 33.6 - 35.0 g/dL    RDW 86.9 (H) 35.9 - 50.0 fL    Platelet Count 215 164 - 446 K/uL    MPV 12.0 9.0 - 12.9 fL    Neutrophils-Polys 92.20 (H) 44.00 - 72.00 %    Lymphocytes 2.50 (L) 22.00 - 41.00 %    Monocytes 3.40 0.00 - 13.40 %    Eosinophils 0.00 0.00 - 6.90 %    Basophils 0.40 0.00 - 1.80 %    Immature Granulocytes 1.50 (H) 0.00 - 0.90 %    Nucleated RBC 2.30 /100 WBC    Neutrophils (Absolute) 22.13 (H) 2.00 - 7.15 K/uL    Lymphs (Absolute) 0.59 (L) 1.00 - 4.80 K/uL    Monos (Absolute) 0.81 0.00 - 0.85 K/uL    Eos (Absolute) 0.01 0.00 - 0.51 K/uL    Baso (Absolute) 0.10 0.00 - 0.12 K/uL    Immature Granulocytes (abs) 0.36 (H) 0.00 - 0.11 K/uL    NRBC (Absolute) 0.56 K/uL    Hypochromia 1+     Anisocytosis 2+     Macrocytosis 2+     Microcytosis 1+    COMP METABOLIC PANEL    Collection Time: 01/20/20 12:34 PM   Result Value Ref Range    Sodium 124 (L) 135 - 145 mmol/L    Potassium 6.9 (HH) 3.6 - 5.5 mmol/L    Chloride 77 (L) 96 - 112 mmol/L    Co2 7 (LL) 20 - 33 mmol/L    Anion Gap 40.0 (H) 0.0 - 11.9    Glucose 72 65 - 99 mg/dL    Bun 206 (HH) 8 - 22 mg/dL    Creatinine 13.41 (HH) 0.50 - 1.40 mg/dL    Calcium 5.0 (LL) 8.5 - 10.5 mg/dL    AST(SGOT) 145 (H) 12 - 45 U/L    ALT(SGPT) 23 2 - 50 U/L    Alkaline Phosphatase 212 (H) 30 - 99 U/L    Total Bilirubin 7.9 (H) 0.1 - 1.5 mg/dL    Albumin 2.3 (L) 3.2 - 4.9 g/dL    Total Protein 6.3 6.0 - 8.2 g/dL    Globulin 4.0 (H) 1.9 - 3.5 g/dL    A-G Ratio 0.6 g/dL   MAGNESIUM    Collection Time: 01/20/20 12:34 PM   Result Value Ref Range    Magnesium 2.8 (H) 1.5 - 2.5 mg/dL   PHOSPHORUS    Collection Time: 01/20/20 12:34 PM   Result Value Ref Range    Phosphorus 15.4 (HH) 2.5 - 4.5 mg/dL   TROPONIN    Collection Time: 01/20/20 12:34 PM   Result Value Ref Range    Troponin T 38 (H) 6 - 19 ng/L   APTT    Collection Time: 01/20/20 12:34 PM   Result Value Ref  Range    APTT 63.4 (H) 24.7 - 36.0 sec   PROTHROMBIN TIME    Collection Time: 20 12:34 PM   Result Value Ref Range    PT 23.6 (H) 12.0 - 14.6 sec    INR 2.03 (H) 0.87 - 1.13   COD - Adult (Type and Screen)    Collection Time: 20 12:34 PM   Result Value Ref Range    ABO Grouping Only O     Rh Grouping Only POS     Antibody Screen-Cod NEG    LACTIC ACID    Collection Time: 20 12:34 PM   Result Value Ref Range    Lactic Acid 13.2 (HH) 0.5 - 2.0 mmol/L   ESTIMATED GFR    Collection Time: 20 12:34 PM   Result Value Ref Range    GFR If  3 (A) >60 mL/min/1.73 m 2    GFR If Non African American 3 (A) >60 mL/min/1.73 m 2   PERIPHERAL SMEAR REVIEW    Collection Time: 20 12:34 PM   Result Value Ref Range    Peripheral Smear Review see below    PLATELET ESTIMATE    Collection Time: 20 12:34 PM   Result Value Ref Range    Plt Estimation Normal    MORPHOLOGY    Collection Time: 20 12:34 PM   Result Value Ref Range    RBC Morphology Present     Polychromia 2+     Poikilocytosis 1+     Ovalocytes 1+     Target Cells 1+     Tear Drop Cells 1+     Stomatocytes 1+     Rouleaux Slight    DIFFERENTIAL COMMENT    Collection Time: 20 12:34 PM   Result Value Ref Range    Comments-Diff see below    UN-XM'D RBC    Collection Time: 20 12:39 PM   Result Value Ref Range    Component R       R99                 Red Cells, LR       [id number]   issued       20   12:38      Product Type R99     Dispense Status issued     Unit Number (Barcoded) [id number]     Product Code (Barcoded) H1632D94     Blood Type (Barcoded) 9500     Component R       R99                 Red Cells, LR       [id number]   released     20   13:32      Product Type R99     Dispense Status /Released     Unit Number (Barcoded) [id number]     Product Code (Barcoded) H4270R35     Blood Type (Barcoded) 9500     Component R       R7                  Red Blood Cells7    [id number]    issued       01/20/20   13:35      Product Type Red Blood Cells LR Pheresis     Dispense Status issued     Unit Number (Barcoded) [id number]     Product Code (Barcoded) V3491V40     Blood Type (Barcoded) 9500    URINALYSIS    Collection Time: 01/20/20 12:51 PM   Result Value Ref Range    Color DK Yellow     Character Turbid (A)     Specific Gravity 1.024 <1.035    Ph 7.5 5.0 - 8.0    Glucose Negative Negative mg/dL    Ketones Negative Negative mg/dL    Protein 300 (A) Negative mg/dL    Bilirubin Large (A) Negative    Urobilinogen, Urine 1.0 Negative    Nitrite Positive (A) Negative    Leukocyte Esterase Small (A) Negative    Occult Blood Trace (A) Negative    Micro Urine Req Microscopic    PLATELET MAPPING WITH BASIC TEG    Collection Time: 01/20/20 12:51 PM   Result Value Ref Range    Reaction Time Initial-R 11.8 (H) 5.0 - 10.0 min    Clot Kinetics-K 1.5 1.0 - 3.0 min    Clot Angle-Angle 71.7 53.0 - 72.0 degrees    Maximum Clot Strength-MA 83.2 (H) 50.0 - 70.0 mm    Lysis 30 minutes-LY30 0.0 0.0 - 8.0 %    % Inhibition ADP 1.9 %    % Inhibition AA 0.0 %    TEG Algorithm Link Algorithm    AMMONIA    Collection Time: 01/20/20 12:51 PM   Result Value Ref Range    Ammonia 73 (H) 11 - 45 umol/L   URINE MICROSCOPIC (W/UA)    Collection Time: 01/20/20 12:51 PM   Result Value Ref Range    WBC 10-20 (A) /hpf    RBC 0-2 /hpf    Bacteria Many (A) None /hpf    Epithelial Cells Few /hpf   BASIC TEG W HEPARINASE    Collection Time: 01/20/20 12:51 PM   Result Value Ref Range    React Time Initial Hep 11.1 (H) 5.0 - 10.0 min    Clot Kinetics Heparinase 1.1 1.0 - 3.0 min    Clot Angle Heparinase 76.8 (H) 53.0 - 72.0 degrees    Max Clot Heparinase 79.0 (H) 50.0 - 70.0 mm    Lysis 30 min Heparinase 0.8 0.0 - 8.0 %   RETICULOCYTES COUNT    Collection Time: 01/20/20 12:51 PM   Result Value Ref Range    Reticulocyte Count 9.4 (H) 0.8 - 2.1 %    Retic, Absolute 0.10 (H) 0.04 - 0.06 M/uL    Imm. Reticulocyte Fraction 21.4 (H) 9.3 -  17.4 %    Retic Hgb Equivalent 33.0 29.0 - 35.0 pg/cell   Prothrombin time (INR)    Collection Time: 01/20/20 12:51 PM   Result Value Ref Range    PT 24.0 (H) 12.0 - 14.6 sec    INR 2.07 (H) 0.87 - 1.13   LACTIC ACID    Collection Time: 01/20/20  1:21 PM   Result Value Ref Range    Lactic Acid 10.0 (HH) 0.5 - 2.0 mmol/L   ARTERIAL BLOOD GAS w/ O2 (LAB)    Collection Time: 01/20/20  1:21 PM   Result Value Ref Range    Ph 7.24 (LL) 7.40 - 7.50    Pco2 16.5 (L) 26.0 - 37.0 mmHg    Po2 198.1 (H) 64.0 - 87.0 mmHg    O2 Saturation 98.8 93.0 - 99.0 %    Hco3 7 (L) 17 - 25 mmol/L    Base Excess -19 (L) -4 - 3 mmol/L    Body Temp see below Centigrade    O2 Therapy 4.0 2.0 - 10.0 L/min   ABO Rh Confirm    Collection Time: 01/20/20  1:21 PM   Result Value Ref Range    ABO Rh Confirm O POS    Influenza By PCR, A/B    Collection Time: 01/20/20  2:07 PM   Result Value Ref Range    Influenza virus A RNA Negative Negative    Influenza virus B, PCR Negative Negative       Imaging  DX-CHEST-PORTABLE (1 VIEW)   Final Result      No acute cardiac or pulmonary abnormalities are identified.      Right IJ central line in place with tip at the expected location of the SVC/right atrial junction.      CT-HEAD W/O    (Results Pending)   US-RENAL    (Results Pending)       Assessment/Plan  * Hyperkalemia  Assessment & Plan  Life threatening hyperkalemia   S/p hyperK cocktail at ED. No ECG changes in ED  Neph consulted  Emergent dialysis     Hemorrhagic shock (HCC)  Assessment & Plan  Due to Upper GI bleed, in the setting of active alcohol abuse.   S/p 4U PRBCs, 1U FFP  Serial H/H, q4h, tranfsue to keep Hgb >7  GI consulted for need of EGD  Protonix gtt  Octreotide gtt    Upper GI bleed  Assessment & Plan  Evidence of hematemesis and melena at admission  Pt's Hgb 4.0 at admission   S/p 4U PRBCs and 1U FFP    GI consulted for EGD  Will need to be intubated for EGD  Serial Hgb Q4H, transfuse to keep Hgb >7  Protonix gtt  Octreotide gtt  NG tube  to suction     Septic shock (HCC)  Assessment & Plan  This is Septic shock Present on admission  SIRS criteria identified on my evaluation include: Hypothermia, with temperature less than 96.8 deg F, Tachycardia, with heart rate greater than 90 BPM, Tachypnea, with respirations greater than 20 per minute and Leukocyosis, with WBC greater than 12,000  Source is Upper GI bleed, GI  Presentation includes: Severe sepsis present and initial Lactate level result is >= 4 mmol/L.   Despite appropriate fluid resuscitation with crystalloid given per sepsis guidelines, the patient remains hypotensive with systolic blood pressure less than 90 or MAP less than 65  Hemodynamic support with additional fluids and IV vasopressors as needed to maintain a SBP of 90 or MAP of 65  IV antibiotics as appropriate for source of sepsis  Reassessment: I have reassessed the patient's hemodynamic status    Start norepinephrine gtt, goal MAP >65  Added vaso  Serial lactates  D/c ceftriaxone, change to piptazo to cover GI source.   Blood cultures have been obtained  Start hydrocortisone 50 Q6H      GENESIS (acute kidney injury) (HCC)  Assessment & Plan  Likely due to ATN, now with life threatening hyperkalemia  Bedside US without evidence of hydronephrosis  Received 3L fluid boluses.   Will do emergent dialysis     Coagulopathy (HCC)  Assessment & Plan  Liver related  TEG with prolonged R, MA ok  INR 2.0  Plt in 200s  Will give total 2 U FFPs  Start vitamin K 10mg IV daily x 3 days  Check fibrinogen.   Transfuse cryo if fibrinogen <200    Lactic acidosis  Assessment & Plan  High lactate at 13 due to septic shock, hemorrhagic shock, in the setting of alcoholic abuse  Serial lactate    Metabolic acidosis  Assessment & Plan  Due to lactic acidosis    Alcohol abuse  Assessment & Plan  Per sundeep, quit 2 weeks ago  Banana bag with thiamine, mag, multivitamin      Discussed patient condition and risk of morbidity and/or mortality with Hospitalist, Family,  RN, RT, Pharmacy and Patient.      I have assessed and reassessed her respiratory status, blood pressure, hemodynamics, cardiovascular and neurological status. SHe is at increased risk for worsening respiratory, cardiovascular and neurological system dysfunction.  SHe is at significant risk for requiring intubation mechanical ventilatory support.  High risk of deterioration and worsening vital organ dysfunction and death without the above critical care interventions.     The patient remains critically ill.  Critical care time = 185 minutes in directly providing and coordinating critical care and extensive data review.  No time overlap and excludes procedures.

## 2020-01-20 NOTE — ASSESSMENT & PLAN NOTE
Resolved. Life threatening hyperkalemia 6.9 at admission. No ECG changes. S/p hyperK cocktail and emergent dialysis 1/20

## 2020-01-20 NOTE — ED TRIAGE NOTES
Pr EMS pt detox ing from ETOH x1 week, progressively weaker over the last 2 days, per ems pt had dried blood around moth and under pt on the bed

## 2020-01-20 NOTE — ASSESSMENT & PLAN NOTE
Patient ryan on pressors.  This is could be due to sepsis but also due to end-stage liver disease where people classically have very low blood pressures patient.  Patient ryan on pressors as well as Midrin.  Will likely will need to tolerate a systolic in the 80s and see if she has organ perfusion with that.    Increasing lactate increasing pressor requirement likely secondary to poor liver function.  Also could be recurrent sepsis bleak overall clinical picture

## 2020-01-20 NOTE — LETTER
February 3, 2020    To Whom It May Concern:         This is confirmation that Juan Gatica has been a family member in the hospital for the past 2 weeks.  His significant other has been in the hospital since 1/20/20 and passed away today.  Please excuse him from work.          If you have any questions please do not hesitate to call me at the phone number listed below.    Sincerely,          Darrell Ratliff M.D.  [phone]

## 2020-01-21 PROBLEM — R56.9 SEIZURE (HCC): Status: ACTIVE | Noted: 2020-01-01

## 2020-01-21 NOTE — PROCEDURES
"Arterial Line Insertion  Date/Time: 1/20/2020 5:10 PM  Performed by: Keith Montenegro D.O.  Authorized by: Keith Montenegro D.O.   Consent: The procedure was performed in an emergent situation. Verbal consent obtained.  Risks and benefits: risks, benefits and alternatives were discussed  Consent given by: spouse  Required items: required blood products, implants, devices, and special equipment available  Time out: Immediately prior to procedure a \"time out\" was called to verify the correct patient, procedure, equipment, support staff and site/side marked as required.  Preparation: Patient was prepped and draped in the usual sterile fashion.  Indications: multiple ABGs and hemodynamic monitoring  Location: left femoral (also attempted left radial artery)  Anesthesia: local infiltration    Anesthesia:  Local Anesthetic: lidocaine 1% without epinephrine  Anesthetic total: 4 mL    Sedation:  Patient sedated: no    Cezar's test normal: no  Needle gauge: 20  Seldinger technique: Seldinger technique used  Number of attempts: 3  Post-procedure: line sutured and dressing applied  Post-procedure CMS: normal  Comments: Procedure was not successful and aborted after 3 attempts              "

## 2020-01-21 NOTE — H&P
"Hospital Medicine History & Physical Note    Date of Service  1/20/2020    Primary Care Physician  Pcp Pt States None    Consultants  Nephrology  Pulmonary/intensivist  Gastroenterology    Code Status  Full    Chief Complaint  Confusion, weakness with recent nausea and vomiting over the last several days and black tarry stools over the last day    History of Presenting Illness  54 y.o. female with a history of daily alcohol, tobacco user, prior pancreatitis, history of upper GI bleed 2 months prior use who quit drinking about 2 weeks ago who presented 1/20/2020 with nausea and vomiting over the last few days and becoming weaker.  Her boyfriend noticed that she become \"glassy eyed\" and less responsive.  He encouraged her to drink sports drinks like Gatorade but felt she was not getting any better.  Boyfriend states she had had decrease oral intake of food over the last week as well.  Paramedics were called and found large amount of blood in her oropharynx as well as some melena on her clothing.  Patient was minimally responsive on arrival to the emergency room with a hemoglobin of 4 and hypotensive with a blood pressure 60/40.     Here in the emergency room she has been transfused.  She is getting fluid boluses.  She is already been given dextrose and insulin along with calcium to help stabilize a elevated potassium serum level.  Nephrology is setting up emergent dialysis.  Patient be transferred to the intensive care for pressor support ongoing monitoring.    I have discussed care with gastroenterologist, nephrologist, pulmonary/intensivist and emergency room physician.  The patient's boyfriend is at been at bedside for whom I have gotten most of the information from.    Patient is critically ill.  Critical care time 35 minutes excluding any procedures spent with the above physicians evaluating the patient and review of chart.    Review of Systems  Review of Systems   Unable to perform ROS: Acuity of condition "       Past Medical History   has a past medical history of Sciatica.    Surgical History   has no past surgical history on file.     Family History  Unknown    Social History   reports that she has been smoking cigarettes. She has been smoking about 0.80 packs per day. She has never used smokeless tobacco. She reports current alcohol use. She reports that she does not use drugs.  Boyfriend states she drinks vodka all day long but sips it.  Quit drinking cold turkey 2 weeks prior.      Allergies  No Known Allergies    Medications  None       Physical Exam  Temp:  [32.8 °C (91 °F)-35.8 °C (96.4 °F)] 33.2 °C (91.8 °F)  Pulse:  [] 94  Resp:  [14-41] 23  BP: ()/(41-92) 98/62  SpO2:  [87 %-100 %] 100 %    Physical Exam  Vitals signs reviewed.   Constitutional:       General: She is in acute distress.      Appearance: She is toxic-appearing and diaphoretic.   HENT:      Head: Normocephalic and atraumatic.      Nose: Nose normal.      Mouth/Throat:      Mouth: Mucous membranes are dry.      Pharynx: Oropharyngeal exudate (coffee ground emesis like material around her oralpharynx) present.   Eyes:      General: Scleral icterus present.         Right eye: No discharge.         Left eye: No discharge.      Conjunctiva/sclera: Conjunctivae normal.      Pupils: Pupils are equal, round, and reactive to light.   Neck:      Musculoskeletal: No muscular tenderness.   Cardiovascular:      Rate and Rhythm: Regular rhythm. Tachycardia present.      Pulses:           Radial pulses are 1+ on the right side and 1+ on the left side.        Dorsalis pedis pulses are 0 on the right side and 0 on the left side.      Heart sounds: No murmur.   Pulmonary:      Effort: Tachypnea present. No respiratory distress.      Breath sounds: No stridor. No wheezing or rales.   Abdominal:      General: Bowel sounds are normal. There is no distension.      Palpations: Abdomen is soft. There is no mass.      Tenderness: There is no tenderness.    Musculoskeletal:         General: No swelling or tenderness.      Comments: Atrophy of extremities   Lymphadenopathy:      Cervical: No cervical adenopathy.   Skin:     General: Skin is warm.      Coloration: Skin is not jaundiced.      Findings: No bruising.   Neurological:      Mental Status: She is lethargic, disoriented and confused.      Cranial Nerves: No cranial nerve deficit.      Motor: Weakness present.   Psychiatric:         Attention and Perception: She is inattentive.         Behavior: Behavior is withdrawn.         Cognition and Memory: Cognition is impaired. Memory is impaired.         Laboratory:  Recent Labs     01/20/20  1234 01/20/20  1550   WBC 24.0*  --    RBC 1.14*  --    HEMOGLOBIN 4.0* 6.4*   HEMATOCRIT 13.3*  --    .7*  --    MCH 35.1*  --    MCHC 30.1*  --    RDW 86.9*  --    PLATELETCT 215  --    MPV 12.0  --      Recent Labs     01/20/20  1234   SODIUM 124*   POTASSIUM 6.9*   CHLORIDE 77*   CO2 7*   GLUCOSE 72   *   CREATININE 13.41*   CALCIUM 5.0*     Recent Labs     01/20/20  1234   ALTSGPT 23   ASTSGOT 145*   ALKPHOSPHAT 212*   TBILIRUBIN 7.9*   GLUCOSE 72     Recent Labs     01/20/20  1234 01/20/20  1251   APTT 63.4*  --    INR 2.03* 2.07*     No results for input(s): NTPROBNP in the last 72 hours.      Recent Labs     01/20/20  1234 01/20/20  1550   TROPONINT 38* 35*       Urinalysis:    Recent Labs     01/20/20  1251   SPECGRAVITY 1.024   GLUCOSEUR Negative   KETONES Negative   NITRITE Positive*   LEUKESTERAS Small*   WBCURINE 10-20*   RBCURINE 0-2   BACTERIA Many*   EPITHELCELL Few        Imaging:  DX-CHEST-FOR LINE PLACEMENT Perform procedure in: Patient's Room   Final Result      1.  Satisfactory positioning of new right IJV multilumen dialysis catheter.      2.  No pneumothorax identified.      CT-HEAD W/O   Final Result      1.  Cerebral atrophy.      2.  Otherwise, Head CT without contrast within normal limits. No evidence of acute cerebral infarction,  hemorrhage or mass lesion.      DX-CHEST-PORTABLE (1 VIEW)   Final Result      No acute cardiac or pulmonary abnormalities are identified.      Right IJ central line in place with tip at the expected location of the SVC/right atrial junction.      US-RENAL    (Results Pending)         Assessment/Plan:  I anticipate this patient will require at least two midnights for appropriate medical management, necessitating inpatient admission.    Hemorrhagic shock (HCC)  Assessment & Plan  Transfusion in the emergency room 1 unit of packed red blood cells aiming for hemoglobin greater than 7  Monitor serial hemoglobins  Good reticulocyte count  Await stat ferritin, iron and TIBC    Upper GI bleed  Assessment & Plan  GI consulting  Octreotide and Protonix drip  Transfuse to keep hemoglobin greater than 7  Avoid any anticoagulation  History of alcohol use    Septic shock (HCC)  Assessment & Plan  This is Severe Sepsis Present on admission  SIRS criteria identified on my evaluation include: Tachycardia, with heart rate greater than 90 BPM and Leukocyosis, with WBC greater than 12,000  Source of infection is unclear at this point time check a urine analysis.  May be component of anemia.  Possible bacteremia.  Clinical indicators of end organ dysfunction include Creatinine >2.0 (Without ESRD or CKD)   Vasopressors to keep systolic blood pressure greater than 90 and map greater than 60  Measure CVP and strict I's and O's  Sepsis protocol initiated  Fluid resuscitation ordered per protocol  IV antibiotics as appropriate for source of sepsis  Reassessment: I have reassessed the patient's hemodynamic status  End organ dysfunction include(s):  Acute kidney failure and Hepatic failure      GENESIS (acute kidney injury) (HCC)  Assessment & Plan  Unknown baseline but appears to be acute.  Boyfriend states no history of that he is aware of any kidney problems  Nephrology consulting  Follow serial labs  Emergent dialysis  Renal  ultrasound    Hyperkalemia  Assessment & Plan  Given dextrose, IV insulin and calcium IV  Follow serial labs  Monitor on telemetry  Nephrology looking toward emergent dialysis    Coagulopathy (HCC)  Assessment & Plan  1/20/2020 INR 2.07  Likely component of chronic liver disease  FFP and vitamin K to be given    Lactic acidosis  Assessment & Plan  Likely secondary to shock  Rule out infectious etiology  Continue antibiotics  Monitor labs  I have placed the patient on bicarb drip    Metabolic acidosis  Assessment & Plan  Probable uremia continue to monitor serial BMPs    Alcohol abuse  Assessment & Plan  Rally bag with vitamins  Alcohol withdrawal protocol  Seizure precautions    Increased ammonia level  Assessment & Plan  Future lactulose follow neurochecks    Elevated troponin  Assessment & Plan  Follow serial troponins  Monitor on telemetry      VTE prophylaxis: SCDs

## 2020-01-21 NOTE — PROGRESS NOTES
Nephrology Daily Progress Note    Date of Service  1/21/2020    HISTORY OF PRESENT ILLNESS:    55yo female BIB EMS to ED for altered mentation. Patient is unable to provide history 2/2 AMS. History obtained from her significant other (at bedside) and ED staff. Patient stopped consuming EtOH ~2 weeks ago after history of excessive use. She did not taper or utilize BZDs. She has had hematemesis x 1-2 months and dark tarry stools for at least 2 weeks. She also stopped smoking and significant other repeatedly denies use of any illicit substance, new medications, or supplements except protein shakes. She has been taking ~2 ibuprofen tabs daily for 2 weeks. EMS reported patient minimally responsive and moaning upon their arrival.      Patient is hypotensive in ED, SBP 60s-100s, responsive to IVF. Hgb 4 at presentation, provided with IVF. She has been placed on PPI. She is acidotic and has significant hyponatremia and hyperkalemia. She had been provided with NS prior to consultation.      No known h/o renal disease or DM. Possible HTN associated with EtOH use per significant other. SCr 0.82 7/2011     UDS negative, ASA pending    Interval Problem Update  01/20 - consult done, HD provided  01/21 - tolerated HD, Na, K, CO2, BUN improved, poor UOP, alert this AM, she confirms no ingestions, cessation of EtOH use after excessive use, use of ~400mg ibuprofen daily x 2 weeks, as well as hematemesis and melena PTA    Review of Systems  Review of Systems   Constitutional: Negative for chills and fever.   HENT: Negative for sinus pain and sore throat.    Eyes: Negative for pain and discharge.   Respiratory: Negative for cough and shortness of breath.    Cardiovascular: Negative for chest pain and leg swelling.   Gastrointestinal: Negative for abdominal pain, diarrhea, melena, nausea and vomiting.   Genitourinary: Negative for dysuria, frequency, hematuria and urgency.   Musculoskeletal: Negative for back pain and myalgias.    Skin: Negative for itching and rash.   Neurological: Negative for focal weakness and seizures.   Endo/Heme/Allergies: Negative for environmental allergies. Does not bruise/bleed easily.   Psychiatric/Behavioral: Negative for memory loss. The patient is not nervous/anxious.         Physical Exam  Temp:  [32.8 °C (91 °F)-35.8 °C (96.4 °F)] 35.8 °C (96.4 °F)  Pulse:  [] 109  Resp:  [] 19  BP: ()/(41-92) 102/68  SpO2:  [82 %-100 %] 98 %    Physical Exam  Constitutional:       General: She is not in acute distress.     Comments: Thin   HENT:      Head: Normocephalic and atraumatic.      Nose: Nose normal.      Mouth/Throat:      Mouth: Mucous membranes are dry.   Eyes:      General:         Right eye: No discharge.         Left eye: No discharge.      Extraocular Movements: Extraocular movements intact.   Cardiovascular:      Rate and Rhythm: Regular rhythm. Tachycardia present.      Heart sounds: No murmur.   Pulmonary:      Effort: Pulmonary effort is normal.      Breath sounds: Normal breath sounds. No wheezing or rales.   Abdominal:      General: Abdomen is flat. There is no distension.      Palpations: Abdomen is soft.   Musculoskeletal:         General: No swelling or tenderness.   Skin:     General: Skin is warm and dry.   Neurological:      General: No focal deficit present.      Mental Status: She is alert and oriented to person, place, and time.   Psychiatric:         Mood and Affect: Mood normal.         Thought Content: Thought content normal.         Fluids    Intake/Output Summary (Last 24 hours) at 1/21/2020 0841  Last data filed at 1/21/2020 0800  Gross per 24 hour   Intake 8175.6 ml   Output 550 ml   Net 7625.6 ml       Laboratory  Recent Labs     01/20/20  1234  01/20/20  2118 01/21/20  0229 01/21/20  0519   WBC 24.0*  --   --  20.7*  --    RBC 1.14*  --   --  2.46*  --    HEMOGLOBIN 4.0*   < > 7.5* 7.9* 8.0*   HEMATOCRIT 13.3*  --   --  23.0*  --    .7*  --   --  93.0  --     MCH 35.1*  --   --  32.1  --    MCHC 30.1*  --   --  34.3  --    RDW 86.9*  --   --  65.4*  --    PLATELETCT 215  --   --  178  --    MPV 12.0  --   --  12.1  --     < > = values in this interval not displayed.     Recent Labs     01/20/20  1550 01/20/20  2118 01/21/20  0229   SODIUM 129* 130* 129*   POTASSIUM 6.1* 3.4* 3.7   CHLORIDE 87* 85* 85*   CO2 7* 21 23   GLUCOSE 140* 201* 253*   * 78* 78*   CREATININE 11.60* 5.58* 5.55*   CALCIUM 6.9* 6.1* 6.6*     Recent Labs     01/20/20  1234 01/20/20  1251 01/21/20  0519   APTT 63.4*  --   --    INR 2.03* 2.07* 1.58*     No results for input(s): NTPROBNP in the last 72 hours.            Imaging     Renal US per radiology read (reviewed):   IMPRESSION:     1.  No hydronephrosis or renal abnormality.  2.  Bladder is decompressed by Velez catheter.    Assessment/Plan     GENESIS: Etiology likely prolonged prerenal state, may be small contributing factor of NSAIDs use. UA reviewed, concerning for possible infection, no casts (except 0-2 hyaline) or crystals noted. Renal US without abnormality. PCR and ACR pending. Continue IVF, LR ordered. Daily labs and weights, renally dose meds, strict I/O, no NSAIDs, no nephrotoxins. ASA pending, UDS negative. No HD today     Hyperkalemia: resolved, monitor     Acidosis: improved     Septic shock: improved, off pressors     Alcohol withdrawal: CIWA per critical care     Hemorrhagic shock and GIB: shock resolved, Hgb improved, on PPI     Hyponatremia: Improved. avoid overcorrection, goal 4-6meq q 24 hours    Hyperphosphatemia: improved, no binders at this time     Probable chronic liver disease and associated coagulopathy: note INR elevated, albumin low, ammonia elevated. Platelets wnl. AST elevated, ALT wnl. No documented h/o cirrhosis      Long discussion with patient and her significant other regarding plan           Other management per primary service        All prior notes form other physicians and RN staff were reviewed from  admission to current day to help me make my clinical decisions    PMH/PSH/FH/SH reviewed and unchanged  Medications and labs reviewed

## 2020-01-21 NOTE — OR SURGEON
Immediate Post OP Note    PreOp Diagnosis:  UGIB  PostOp Diagnosis:   Grad D ulcerative esopahgitis  Small hiatus hernia  Mild portal HTN gastropathy    Procedure(s):  GASTROSCOPY    Surgeon(s):  Ned Díaz M.D.    Anesthesiologist/Type of Anesthesia:  Per ICU propofol    Surgical Staff:  Endoscopy Technician: (Unknown)  Endoscopy Nurse: (Unknown)  Genaro and Elena  Specimens removed if any:  NA    Estimated Blood Loss:NA    Findings: See above    Complications: None immediate    Rec:  PPI BID  Start with full liquids and advance as tolerated        1/21/2020 1:40 PM Ned Díaz M.D.

## 2020-01-21 NOTE — PROGRESS NOTES
[facility] Progress Note       HD ordered by Dr. Day. Treatment started at 1714 and ended at 1944.    Net UF removed: 0mL.     Pt tolerated treatment well, had one episode of seizure activity during HD, was able to finish treatment. See paper flow sheet for details. CVC patent, locked with Heparin 1,000 units. No s/s of infection present. Dressing in place, CDI. Report given to EMELY Banda RN.

## 2020-01-21 NOTE — ASSESSMENT & PLAN NOTE
Due to Upper GI bleed, in the setting of active alcohol abuse. - resolved. Bleeding seems to have stopped.   S/p 4U PRBCs, 1U FFP on 1/20  No further blood transfusions  Transfuse to keep Hgb >7    Resolved

## 2020-01-21 NOTE — ASSESSMENT & PLAN NOTE
Patient's for supportive therapy with multiorgan failure on artificial life support including dialysis pressor agents for shock mechanical ventilation respiratory failure rifaximin and lactulose for neurologic failure from hepatic encephalopathy.  Again bleak prognosis

## 2020-01-21 NOTE — PROGRESS NOTES
Subjective: I was called stat back to the bedside at 1720 for seizure activity.  Bedside nurse reports that the patient was talking to her fiancé at the bedside when she had a sudden onset of generalized seizure-like activity that lasted approximately 90 seconds and was terminated with Ativan 1 mg.    Objective:   Vital signs: 35.5, heart rate 107, SBP 95, respiratory rate 18, oxygen saturations 100%.  Unconscious unresponsive  Protecting her airway  Cough and gag reflex present  Lung sounds clear to auscultation bilaterally  Tachycardic regular rhythm no murmurs gallops rubs  Abdomen round soft nontender nondistended  Extremities: Trace edema  Skin: Pale cool dry    Labs reviewed: Blood glucose within acceptable range,   Sodium 124 potassium 6.9 chloride 77  creatinine 13.4 lactate 13.2  ABG- 7.22/30/22/12.7  Ionized calcium 0.68    Dialysis initiated approximately 45 minutes before the seizure activity.    I placed a arterial line please see procedure note for details    Assessment: Generalized seizures shortly after initiation of hemodialysis, now postictal.  Plan:  1. Mannitol 0.5 g/KG  2. Serial neurologic exams  3. Remains at high risk for requiring endotracheal intubation for airway protection.  4.  Optimize calcium    Multiple re-evaluations throughout this evening.  Her mental status is improving but she has still not returned to baseline.  If she does not return to baseline mental status within the next 6-8  hours continuous EEG is indicated.    This patient remains at high risk for worsening central nervous system dysfunction, requiring frequent neurological assessment and strict blood pressure control.  I am titrating Levophed.  I have assessed and reassessed the neurologic exam, blood pressure, and hemodynamics     Critical care time 60 minutes.  In addition to time spent by  before I assumed care this evening.

## 2020-01-21 NOTE — PROGRESS NOTES
"Critical Care Progress Note    Date of admission  1/20/2020    Chief Complaint  AMS, hematemesis    Hospital Course    54 y.o. female with hx of alcohol abuse, hx of recent pancreatitis, upper GI bleed 2 months ago, who presented 1/20/2020 with altered mental status and abd pain. Pt was actively drinking until 2 weeks ago where she quit \"cold turkey\" per sundeep. History obtained from  and medical record. Since then, pt has been having N/V and in the past few days, pt developed progressive weakness and AMS. Sundeep reported small amount of blood in her emesis as well as in her stools. Pt was brought to ED. At ED, pt was hypotensive with BP in 60/40s. Also reported ED noted melanotic stools on the sheets and some blood around her mouth. Pt received 3L crystalloid fluid boluses. Lactate 13, creatinine 13, K 6.9. HCO3 7. Ceftriaxone started. Blood cultures obtained. INR 2.0, plt 215K     HyperK cocktail was given. Nephrology was consulted for emergent dialysis. Hgb 4.0, received 2U uncrossmatched PRBCs with 1U plat and 1U FFP. Per ED, pt's mental status is somewhat improved with fluids, but pt still moaning while I got there. Pt c/o epigastric pain. On 3L NC with sat >90%. SBP in 100s after fluid boluses.       Interval Problem Update  Reviewed last 24 hour events:  Remains critically ill  Noted episode of seizure overnight, aborted with ativan 1mg IV x1.   Pt was able to protect her airway.   Was hypothermic yesterday at 33.6C, overnight with Tm 37.7  HR in 100-110s  MAP in low 60s. Remains on 2 pressors norepi and vasopressin   Very minimal NG output   Hgb stable in 7- 8s. No further PRBC transfusions required.   Pt did have emergent dialysis yesterday for hyperkalemia  K is 3.7 this am.   Lipase 360  Lactic acid continues to improve 3.5  PH 7.59. Bicarb gtt was discontinued    Review of Systems  Review of Systems   Reason unable to perform ROS: limited due to mental status.        Vital Signs for last 24 " hours   Temp:  [32.8 °C (91 °F)-35.8 °C (96.4 °F)] 35.8 °C (96.4 °F)  Pulse:  [] 108  Resp:  [] 20  BP: ()/(41-92) 102/68  SpO2:  [82 %-100 %] 98 %    Hemodynamic parameters for last 24 hours       Respiratory Information for the last 24 hours       Physical Exam   Physical Exam  Vitals signs and nursing note reviewed.   Constitutional:       General: She is not in acute distress.     Appearance: She is obese. She is ill-appearing and toxic-appearing. She is not diaphoretic.   HENT:      Head: Normocephalic and atraumatic.      Mouth/Throat:      Mouth: Mucous membranes are dry.   Eyes:      General: Scleral icterus present.      Pupils: Pupils are equal, round, and reactive to light.   Neck:      Musculoskeletal: Neck supple.   Cardiovascular:      Rate and Rhythm: Normal rate and regular rhythm.      Pulses: Normal pulses.      Heart sounds: Normal heart sounds. No murmur.   Pulmonary:      Effort: No respiratory distress.      Breath sounds: Normal breath sounds. No wheezing, rhonchi or rales.   Abdominal:      General: There is no distension.      Palpations: Abdomen is soft.      Tenderness: There is no tenderness. There is no guarding.   Musculoskeletal:      Right lower leg: No edema.      Left lower leg: No edema.   Skin:     Coloration: Skin is not jaundiced.      Findings: No bruising or rash.   Neurological:      General: No focal deficit present.      Mental Status: She is alert and oriented to person, place, and time.         Medications  Current Facility-Administered Medications   Medication Dose Route Frequency Provider Last Rate Last Dose   • norepinephrine (LEVOPHED) 8 mg in  mL Infusion  0-30 mcg/min Intravenous Continuous Noé Mark M.D. 46.9 mL/hr at 01/21/20 0602 25 mcg/min at 01/21/20 0602   • Pharmacy Consult Request - to monitor for nephrotoxic agents  1 Each Other PHARMACY TO DOSE Anthony Pardo D.O.       • Respiratory Care per Protocol   Nebulization  Continuous RT Anthony Pardo D.O.       • lactated ringers infusion (BOLUS): BMI less than or equal to 30  30 mL/kg Intravenous Once PRN Anthony Pardo D.O.       • octreotide (SANDOSTATIN) 1,250 mcg in  mL Infusion  50 mcg/hr Intravenous Continuous REILLY MurphyOTucker 10 mL/hr at 01/20/20 1621 50 mcg/hr at 01/20/20 1621   • pantoprazole (PROTONIX) 80 mg in  mL Infusion  8 mg/hr Intravenous Continuous REILLY MurphyOTucker 25 mL/hr at 01/21/20 0135 8 mg/hr at 01/21/20 0135   • LORazepam (ATIVAN) injection 4 mg  4 mg Intravenous Q15 MIN PRN Anthony Pardo D.O.        Or   • LORazepam (ATIVAN) injection 3 mg  3 mg Intravenous Q15 MIN PRN Anthony Pardo D.O.        Or   • LORazepam (ATIVAN) injection 2 mg  2 mg Intravenous Q15 MIN PRN Anthony Pardo D.O.        Or   • LORazepam (ATIVAN) injection 1 mg  1 mg Intravenous Q15 MIN PRN REILLY MurphyOTucker   1 mg at 01/20/20 1917   • thiamine tablet 100 mg  100 mg Oral DAILY Anthony Pardo D.O.        And   • folic acid (FOLVITE) tablet 1 mg  1 mg Oral DAILY Anthony Pardo D.O.        And   • multivitamin (THERAGRAN) tablet 1 Tab  1 Tab Oral DAILY Anthony Pardo D.O.       • piperacillin-tazobactam (ZOSYN) 4.5 g in  mL IVPB  4.5 g Intravenous Q12HRS Keith Montenegro D.O.   Stopped at 01/21/20 0338   • vasopressin (VASOSTRICT) 20 Units in  mL Infusion  0.03 Units/min Intravenous Continuous Keith Montenegro D.O. 9 mL/hr at 01/21/20 0334 0.03 Units/min at 01/21/20 0334   • hydrocortisone sodium succinate PF (SOLU-CORTEF) 100 MG injection 50 mg  50 mg Intravenous Q6HRS REILLY GonzalesOTucker   50 mg at 01/21/20 0512   • heparin injection 2,400 Units  2,400 Units Intracatheter DIALYSIS PRN Siri Day D.O.   2,400 Units at 01/20/20 1945       Fluids    Intake/Output Summary (Last 24 hours) at 1/21/2020 0619  Last data filed at 1/21/2020 0602  Gross per 24 hour   Intake 7863.49 ml   Output 550 ml   Net 7313.49 ml       Laboratory  Recent Labs      01/20/20  1321 01/20/20  1714 01/20/20  2247   OGEVK91I 7.24*  --   --    ITNTNR754D 16.5*  --   --    VCPZS173S 198.1*  --   --    CPDR4XNL 98.8  --   --    ARTHCO3 7*  --   --    S9MVZUQSX 4.0  --   --    ARTBE -19*  --   --    ISTATAPH  --   --  7.598*   ISTATAPCO2  --   --  24.2*   ISTATAPO2  --   --  76   ISTATATCO2  --   --  24   [id number]  --   --  97   ISTATARTHCO3  --   --  23.7   ISTATARTBE  --   --  2   ISTATTEMP  --  33.2 C 36.9 C   ISTATFIO2  --   --  28   ISTATSPEC  --  Venous Arterial   ISTATAPHTC  --   --  7.599*   UYNGECMW6EQ  --   --  75     Recent Labs     01/20/20  1251   CPKTOTAL 337*     Recent Labs     01/20/20  1234 01/20/20  1550 01/20/20 2118 01/21/20 0229   SODIUM 124* 129* 130* 129*   POTASSIUM 6.9* 6.1* 3.4* 3.7   CHLORIDE 77* 87* 85* 85*   CO2 7* 7* 21 23   * 181* 78* 78*   CREATININE 13.41* 11.60* 5.58* 5.55*   MAGNESIUM 2.8*  --   --  1.8   PHOSPHORUS 15.4*  --   --  6.5*   CALCIUM 5.0* 6.9* 6.1* 6.6*     Recent Labs     01/20/20  1234 01/20/20  1550 01/20/20 2118 01/21/20 0229   ALTSGPT 23  --   --  23   ASTSGOT 145*  --   --  142*   ALKPHOSPHAT 212*  --   --  197*   TBILIRUBIN 7.9*  --   --  8.5*   GLUCOSE 72 140* 201* 253*     Recent Labs     01/20/20  1234 01/21/20 0229   WBC 24.0* 20.7*   NEUTSPOLYS 92.20* 92.90*   LYMPHOCYTES 2.50* 1.50*   MONOCYTES 3.40 4.00   EOSINOPHILS 0.00 0.00   BASOPHILS 0.40 0.50   ASTSGOT 145* 142*   ALTSGPT 23 23   ALKPHOSPHAT 212* 197*   TBILIRUBIN 7.9* 8.5*     Recent Labs     01/20/20  1234 01/20/20  1251  01/20/20 2118 01/21/20  0229 01/21/20  0519   RBC 1.14*  --   --   --  2.46*  --    HEMOGLOBIN 4.0*  --    < > 7.5* 7.9* 8.0*   HEMATOCRIT 13.3*  --   --   --  23.0*  --    PLATELETCT 215  --   --   --  178  --    PROTHROMBTM 23.6* 24.0*  --   --   --  19.4*   APTT 63.4*  --   --   --   --   --    INR 2.03* 2.07*  --   --   --  1.58*   IRON 118  --   --   --   --   --    TOTIRONBC 139*  --   --   --   --   --     < > = values in  this interval not displayed.       Imaging  X-Ray:  I have personally reviewed the images and compared with prior images.    Assessment/Plan  * Septic shock (HCC)  Assessment & Plan  This is Septic shock Present on admission  SIRS criteria identified on my evaluation include: Hypothermia, with temperature less than 96.8 deg F, Tachycardia, with heart rate greater than 90 BPM, Tachypnea, with respirations greater than 20 per minute and Leukocyosis, with WBC greater than 12,000  Source is Upper GI bleed, GI  Presentation includes: Severe sepsis present and initial Lactate level result is >= 4 mmol/L.   Despite appropriate fluid resuscitation with crystalloid given per sepsis guidelines, the patient remains hypotensive with systolic blood pressure less than 90 or MAP less than 65  Hemodynamic support with additional fluids and IV vasopressors as needed to maintain a SBP of 90 or MAP of 65  IV antibiotics as appropriate for source of sepsis  Reassessment: I have reassessed the patient's hemodynamic status    Continue norepinephrine gtt and vasopressin to actively titrate to goal MAP >60  Serial lactates until lactate <2. I expect decrease lactate clearance due to her liver dysfunction .   Continue piptazo.   Nasal MRSA screen positive, decolonize with mupirocin BID x 5 days.   Follow up blood cultures  Continue hydrocortisone 50 Q6H x 5days  Thiamine 200 IV Q12H x 5 days  CT A/P to rule out any intraabdominal source. Noted hypodense lesions in the liver. Will need contrast to better characterize the lesions    Hemorrhagic shock (HCC)  Assessment & Plan  Due to Upper GI bleed, in the setting of active alcohol abuse.   S/p 4U PRBCs, 1U FFP on 1/20  Bleeding seems to have stopped.   H/H Q6H and if Hgb stable in next 24 hours can decrease to Q12H tomorrow.   Transfuse to keep Hgb >7      Upper GI bleed  Assessment & Plan  Evidence of hematemesis and melena at admission  Pt's Hgb 4.0 at admission   S/p 4U PRBCs and 2U  FFPs  EGD today by GI and noted ulcerative esophagitis. No esophageal varices  H/H Q6H transfuse to keep Hgb >7  Discontinue protonix gtt and switched to 40 BID  Discontinue octreotide gtt  Ionized calcium is low and will replete calcium chloride.   Ok for oral diet from GI standpoint. Will discontinue Ng tube.   Swallow study and advance PO as tolerated     GENESIS (acute kidney injury) (HCC)  Assessment & Plan  Likely due to ATN, now with life threatening hyperkalemia  Bedside US without evidence of hydronephrosis  Received 3L fluid boluses.   S/p emergent dialysis 1/20    Hyperkalemia  Assessment & Plan  Life threatening hyperkalemia   S/p hyperK cocktail at ED. No ECG changes in ED  Neph consulted  Emergent dialysis     Seizure (HCC)  Assessment & Plan  One episode of seizure last night. No further episodes noted today.   Pt alert, oriented.   Ativan 2-4 mg IV [id number] prn seizures only     Coagulopathy (HCC)  Assessment & Plan  Liver related  TEG with prolonged R, MA ok  INR 2.0  Plt in 200s, fibrinogen in 500s  Continue vitamin K 10mg IV daily x 3 days      Lactic acidosis  Assessment & Plan  High lactate at 13 due to septic shock, hemorrhagic shock, in the setting of alcoholic abuse  Significantly improved, last was 3.5  Continue serial lactates until lactate <2    Metabolic acidosis  Assessment & Plan  Due to lactic acidosis  Resolved. Bicarb gtt was stopped    Alcohol abuse  Assessment & Plan  Per sundeep and patient, quit 2 weeks ago  Banana bag with thiamine, mag, multivitamin completed  Urine drug screen negative   Pt not in withdrawal. Will discontinue CIWA protocol        VTE:  Contraindicated  Ulcer: PPI  Lines: Central Line  Ongoing indication addressed, Arterial Line  Ongoing indication addressed and Velez Catheter  Ongoing indication addressed    I have performed a physical exam and reviewed and updated ROS and Plan today (1/21/2020). In review of yesterday's note (1/20/2020), there are no changes  except as documented above.     Discussed patient condition and risk of morbidity and/or mortality with Family, RN, RT, Pharmacy, Charge nurse / hot rounds and Patient  The patient remains critically ill.  Critical care time = 90 minutes in directly providing and coordinating critical care and extensive data review.  No time overlap and excludes procedures.

## 2020-01-21 NOTE — PROCEDURES
Arterial Line Insertion  Date/Time: 1/21/2020 1:47 AM  Performed by: Toy Martinez M.D.  Authorized by: Toy Martinez M.D.   Consent: The procedure was performed in an emergent situation. Verbal consent not obtained.  Indications: multiple ABGs, respiratory failure and hemodynamic monitoring  Location: Left axillary artery.    Anesthesia:  Local Anesthetic: lidocaine 1% without epinephrine    Sedation:  Patient sedated: no    Cezar's test normal: yes  Needle gauge: 18  Seldinger technique: Seldinger technique used  Number of attempts: 2  Post-procedure: line sutured and dressing applied  Post-procedure CMS: unchanged  Patient tolerance: Patient tolerated the procedure well with no immediate complications  Comments: The site was selected as the patient's radial arteries were found to be extremely small caliber and femoral attempted previously been made.  A small non-expanding hematoma  was noted at the conclusion of this procedure.  Distal pulses remained intact.  Nurse was notified to perform hourly neurovascular assessments and notify MD for any neurovascular changes.

## 2020-01-21 NOTE — ASSESSMENT & PLAN NOTE
Patient was drinking excessive alcohol.  The alcohol at this point has completely destroyed her liver.  The patient now is in hepatorenal failure.  Continue with comfort care measures

## 2020-01-21 NOTE — PROGRESS NOTES
1615: Dr. Montenegro to set up art line, PRBC Running  1625: 2mL Donnie stick  1630: 2 amps bicarb verbal order  1638: Levo at 30, vaso started  1655: dialysis setting up at bedside, x ray at bedside  1700: 1 unit PRBC verified with RN x 2 to be run with dialysis

## 2020-01-21 NOTE — ASSESSMENT & PLAN NOTE
Liver related  TEG with prolonged R, MA ok  INR 2.0  Plt in 200s, fibrinogen in 500s  Completed vitamin K 10mg IV daily x 3 days 1/20-1/22    Coagulopathy likely secondary to burnout liver with limited ability to make coagulation factors

## 2020-01-21 NOTE — THERAPY
"Speech Language Therapy Clinical Swallow Evaluation completed.  Functional Status: Patient strict NPO pending evaluation after EGD today. Per GI note, \"start with full liquids and advance as tolerated.\" Patient sleeping, but rousing to verbal and tactile cues, although appearing weak and sleepy throughout evaluation. Informal oral motor evaluation completed d/t same, which revealed moderate amount of dried, dark red blood in oropharynx; RN aware. Patient was provided with oral care prior to PO trials with minimal amount of dried blood removed. Patient with no other gross deficits or abnormalities during inspection of oral motor function. Patient consumed PO trials of single ice chips, NTL via tsp and cup sip, jello, and thin liquids via tsp, cup sip, and straw. Patient had no overt s/sx of aspiration on any consistencies trialed. Laryngeal elevation palpated as complete. Initiation of swallow trigger was delayed 2-3 seconds. Vocal quality remained clear. At this time, recommend patient start full liquid diet. Patient will likely need assistance with feeding d/t weakness and poor coordination. RN aware. SLP to follow for upgraded trials as able, appropriate, and cleared by GI.     Recommendations - Diet: Full liquid (per GI recs)                            Strategies: Monitor during meals, Assistance needed for meal tray set-up and Head of Bed at 90 Degrees                          Medication Administration: Other (Per GI recs)  Plan of Care: Will benefit from Speech Therapy 3 times per week  Post-Acute Therapy: Recommend inpatient transitional care services for continued speech therapy services.      See \"Rehab Therapy-Acute\" Patient Summary Report for complete documentation.   "

## 2020-01-21 NOTE — PROCEDURES
Conscious Sedation Procedure Note    Indication: Upper GI bleed    Consent: I have discussed with the patient and/or the patient representative the indication, alternatives, and the possible risks and/or complications of the planned procedure and the anesthesia methods. The patient and/or patient representative appear to understand and agree to proceed.    Physician Involvement: The attending physician was present and supervising this procedure.    Pre-Sedation Documentation and Exam: I have personally completed a history, physical exam & review of systems for this patient (see notes).  Vital signs have been reviewed (see flow sheet for vitals).    Airway Assessment: Mallampati Class II - (soft palate, fauces & uvula are visible)    Prior History of Anesthesia Complications: none    ASA Classification: Class 4 - A patient with an incapacitating systemic disease that is a constant threat to life    Sedation/ Anesthesia Plan: intravenous sedation    Medications Used: fentanyl intravenously (total 100mcg) and propofol intravenously (total 60mg)    Monitoring and Safety: The patient was placed on a cardiac monitor and vital signs, pulse oximetry and level of consciousness were continuously evaluated throughout the procedure. The patient was closely monitored until recovery from the medications was complete and the patient had returned to baseline status. Respiratory therapy was on standby at all times during the procedure.    Post-Sedation Vital Signs: Vital signs were reviewed and were stable after the procedure.            Post-Sedation Exam: Lungs: clear to auscultation bilaterally and Cardiovascular: regular rate and rhythm           Complications: none    Sedation Time: 1325 to 1340. 15 Total minutes    [id number]

## 2020-01-21 NOTE — ASSESSMENT & PLAN NOTE
Evidence of hematemesis and melena at admission  Pt's Hgb 4.0 at admission   S/p 4U PRBCs and 2U FFPs  EGD 1/22 by GI and noted ulcerative esophagitis. No esophageal varices  Continue protonix 40 BID  Ionized calcium is still low and will replete calcium chloride.   Advance diet as tolerated  Repeat iron studies in 3 days.     No evidence of ongoing bleeding today

## 2020-01-21 NOTE — PROCEDURES
DATE OF SERVICE:  01/21/2020    PROCEDURE:  Esophagogastroduodenoscopy.    PREOPERATIVE DIAGNOSES:  1.  Hematemesis.  2.  Hematochezia.  3.  Alcoholism.  4.  Extreme anemia with hemoglobin around 4.    POSTOPERATIVE DIAGNOSES:  1.  [place] grade D esophagitis, erosive with esophageal ulcers distally.  2.  Small hiatal hernia.  3.  Impression of mild portal hypertensive gastropathy with some clots and   blood throughout the stomach, but no varices.    SEDATION:  Per ICU attending, propofol sedation, deep sedation, MAC.    CONSENT:  Written informed consent was obtained.    DESCRIPTION OF PROCEDURE:  The patient was positioned in the left lateral   decubitus.  The bite block was inserted.  The diagnostic Olympus upper   endoscope was advanced without difficulty up to the second portion of the   duodenum.  It was retroflexed in the stomach and then further withdrawn.  No   immediate complications occurred.  There was no blood loss associated with the   procedure.  The patient tolerated the procedure well.    FINDINGS:  1.  Esophagus:  LA grade D esophagitis with longitudinal 3-4 cm shallow   ulcerations in the distal esophagus.  Small hiatal hernia.  2.  Stomach:  Impression of mild diffuse portal hypertensive gastropathy, but   no source of bleeding, no varices noted.  No biopsies taken.  3.  Duodenum:  Grossly normal.    RECOMMENDATIONS:  1.  Protonix 40 mg b.i.d.  2.  Follow up in the outpatient setting for repeat EGD in about 2 months.  3.  Follow up in the outpatient setting with [facility] for   further evaluation of her liver disease.  4.  Ultrasound of the abdomen and portal vein to assess for portal   hypertension to assess for abnormal contour of the liver.  5.  Monitor liver function test.  6.  Outpatient workup for advanced liver disease versus alcoholic hepatitis.       ____________________________________     Ned Díaz MD EMD / AIDAN    DD:  01/21/2020 13:46:13  DT:   01/21/2020 15:20:49    D#:  [id number]  Job#:  [id number]

## 2020-01-21 NOTE — CONSULTS
[facility] -  CONSULTATION NOTE               Author: Siri Day D.O. Date & Time: 1/20/2020  4:42 PM       REASON FOR CONSULTATION:   - GENESIS    CHIEF COMPLAINT:   -  altered    HISTORY OF PRESENT ILLNESS:    55yo female BIB EMS to ED for altered mentation. Patient is unable to provide history 2/2 AMS. History obtained from her significant other (at bedside) and ED staff. Patient stopped consuming EtOH ~2 weeks ago after history of excessive use. She did not taper or utilize BZDs. She has had hematemesis x 1-2 months and dark tarry stools for at least 2 weeks. She also stopped smoking and significant other repeatedly denies use of any illicit substance, new medications, or supplements except protein shakes. She has been taking ~2 ibuprofen tabs daily for 2 weeks. EMS reported patient minimally responsive and moaning upon their arrival.     Patient is hypotensive in ED, SBP 60s-100s, responsive to IVF. Hgb 4 at presentation, provided with IVF. She has been placed on PPI. She is acidotic and has significant hyponatremia and hyperkalemia. She had been provided with NS prior to consultation.     No known h/o renal disease or DM. Possible HTN associated with EtOH use per significant other. SCr 0.82 7/2011    UDS negative, ASA pending      REVIEW OF SYSTEMS:    Review of Systems   Unable to perform ROS: Mental status change         PAST MEDICAL HISTORY:   - Depression and SI  - EtOH abuse  - Sciatica    PAST SURGICAL HISTORY:   - none reported by significant other    FAMILY HISTORY:   - Unable to obtain 2/2 AMS, significant other does not know    SOCIAL HISTORY:   - +tobacco use, +alcohol use (excessive), no illicit drugs    HOME MEDICATIONS: (none)  - Reviewed and documented in chart    Inpatient meds reviewed    Current Facility-Administered Medications:   •  albuterol (PROVENTIL) 2.5 mg/0.5 mL solution nebulizer, , , ,   •  norepinephrine (LEVOPHED) 8 mg in  mL Infusion,  0-30 mcg/min, Intravenous, Continuous, Noé Mark M.D., Last Rate: 46.9 mL/hr at 01/20/20 1649, 25 mcg/min at 01/20/20 1649  •  sodium bicarbonate 8.4 % 150 mEq in D5W 1,000 mL infusion, , Intravenous, Continuous, Siri Day D.O., Last Rate: 125 mL/hr at 01/20/20 1615  •  Pharmacy Consult Request - to monitor for nephrotoxic agents, 1 Each, Other, PHARMACY TO DOSE, Anthony Pardo D.O.  •  Respiratory Care per Protocol, , Nebulization, Continuous RT, Anthony Pardo D.O.  •  lactated ringers infusion (BOLUS): BMI less than or equal to 30, 30 mL/kg, Intravenous, Once PRN, Anthony Pardo D.O.  •  octreotide (SANDOSTATIN) 1,250 mcg in  mL Infusion, 50 mcg/hr, Intravenous, Continuous, Anthony Pardo D.O., Last Rate: 10 mL/hr at 01/20/20 1621, 50 mcg/hr at 01/20/20 1621  •  pantoprazole (PROTONIX) 80 mg in  mL Infusion, 8 mg/hr, Intravenous, Continuous, Anthony Pardo D.O., Last Rate: 25 mL/hr at 01/20/20 1622, 8 mg/hr at 01/20/20 1622  •  LORazepam (ATIVAN) injection 4 mg, 4 mg, Intravenous, Q15 MIN PRN **OR** LORazepam (ATIVAN) injection 3 mg, 3 mg, Intravenous, Q15 MIN PRN **OR** LORazepam (ATIVAN) injection 2 mg, 2 mg, Intravenous, Q15 MIN PRN **OR** LORazepam (ATIVAN) injection 1 mg, 1 mg, Intravenous, Q15 MIN PRN, Anthony Pardo D.O.  •  [COMPLETED] detox IV 1000 mL (D5LR + magnesium 1 g + thiamine 100 mg + folic acid 1 mg) infusion, , Intravenous, Once, Last Rate: 500 mL/hr at 01/20/20 1606 **AND** [START ON 1/21/2020] thiamine tablet 100 mg, 100 mg, Oral, DAILY **AND** [START ON 1/21/2020] folic acid (FOLVITE) tablet 1 mg, 1 mg, Oral, DAILY **AND** [START ON 1/21/2020] multivitamin (THERAGRAN) tablet 1 Tab, 1 Tab, Oral, DAILY, Anthony Pardo D.O.  •  phytonadione (AQUA-MEPHYTON) 10 mg in NS 50 mL IVPB, 10 mg, Intravenous, Once, REILLY MurphyO., Last Rate: 50 mL/hr at 01/20/20 1601, 10 mg at 01/20/20 1601  •  piperacillin-tazobactam (ZOSYN) 4.5 g in  mL IVPB, 4.5 g,  "Intravenous, Once **AND** piperacillin-tazobactam (ZOSYN) 4.5 g in  mL IVPB, 4.5 g, Intravenous, Q12HRS, Keith Montenegro D.O.  •  vasopressin (VASOSTRICT) 20 Units in  mL Infusion, 0.03 Units/min, Intravenous, Continuous, Keith Montenegro D.O., Last Rate: 9 mL/hr at 01/20/20 1638, 0.03 Units/min at 01/20/20 1638  •  albuterol (PROVENTIL) 2.5mg/0.5ml nebulizer solution 10 mg, 10 mg, Nebulization, ONCE (RT), Keith Montenegro D.O.      ALLERGIES:  Patient has no known allergies.    VS:  BP (!) 72/55   Pulse 91   Temp (!) 33.5 °C (92.3 °F)   Resp 20   Ht 1.676 m (5' 6\")   Wt 65.8 kg (145 lb)   SpO2 100%   BMI 23.40 kg/m²   PHYSICAL EXAM(2,8)  VITAL SIGNS: /54   Pulse 67   Temp (!) 33.5 °C (92.3 °F)   Resp 20   Ht 1.676 m (5' 6\")   Wt 65.8 kg (145 lb)   SpO2 99%   BMI 23.40 kg/m²    Constitutional: thin, ill appearing  HENT: Normocephalic, atraumatic, bilateral external ears normal, blood crusting lips, dry membranes  Eyes: PERRLA, conjunctiva pink  Cardiovascular: tachycardic, regular  Respiratory: CTAB no WRR  Gastrointestinal: soft, nt/nd  Skin: No erythema, no rash.  Genitourinary: No costovertebral angle tenderness, no obvious abnormality of vulva  Neurologic: Alert, unable to assess orientation, CORTEZ   Psychiatric: opens eyes to voices, makes eye contact, moves mouth but does not answer questions      LABORATORY STUDIES:   - Reviewed and documented in chart      LABS:  Recent Results (from the past 24 hour(s))   CBC WITH DIFFERENTIAL    Collection Time: 01/20/20 12:34 PM   Result Value Ref Range    WBC 24.0 (H) 4.8 - 10.8 K/uL    RBC 1.14 (L) 4.20 - 5.40 M/uL    Hemoglobin 4.0 (LL) 12.0 - 16.0 g/dL    Hematocrit 13.3 (LL) 37.0 - 47.0 %    .7 (H) 81.4 - 97.8 fL    MCH 35.1 (H) 27.0 - 33.0 pg    MCHC 30.1 (L) 33.6 - 35.0 g/dL    RDW 86.9 (H) 35.9 - 50.0 fL    Platelet Count 215 164 - 446 K/uL    MPV 12.0 9.0 - 12.9 fL    Neutrophils-Polys 92.20 (H) 44.00 - 72.00 %    Lymphocytes 2.50 (L) 22.00 " - 41.00 %    Monocytes 3.40 0.00 - 13.40 %    Eosinophils 0.00 0.00 - 6.90 %    Basophils 0.40 0.00 - 1.80 %    Immature Granulocytes 1.50 (H) 0.00 - 0.90 %    Nucleated RBC 2.30 /100 WBC    Neutrophils (Absolute) 22.13 (H) 2.00 - 7.15 K/uL    Lymphs (Absolute) 0.59 (L) 1.00 - 4.80 K/uL    Monos (Absolute) 0.81 0.00 - 0.85 K/uL    Eos (Absolute) 0.01 0.00 - 0.51 K/uL    Baso (Absolute) 0.10 0.00 - 0.12 K/uL    Immature Granulocytes (abs) 0.36 (H) 0.00 - 0.11 K/uL    NRBC (Absolute) 0.56 K/uL    Hypochromia 1+     Anisocytosis 2+     Macrocytosis 2+     Microcytosis 1+    COMP METABOLIC PANEL    Collection Time: 01/20/20 12:34 PM   Result Value Ref Range    Sodium 124 (L) 135 - 145 mmol/L    Potassium 6.9 (HH) 3.6 - 5.5 mmol/L    Chloride 77 (L) 96 - 112 mmol/L    Co2 7 (LL) 20 - 33 mmol/L    Anion Gap 40.0 (H) 0.0 - 11.9    Glucose 72 65 - 99 mg/dL    Bun 206 (HH) 8 - 22 mg/dL    Creatinine 13.41 (HH) 0.50 - 1.40 mg/dL    Calcium 5.0 (LL) 8.5 - 10.5 mg/dL    AST(SGOT) 145 (H) 12 - 45 U/L    ALT(SGPT) 23 2 - 50 U/L    Alkaline Phosphatase 212 (H) 30 - 99 U/L    Total Bilirubin 7.9 (H) 0.1 - 1.5 mg/dL    Albumin 2.3 (L) 3.2 - 4.9 g/dL    Total Protein 6.3 6.0 - 8.2 g/dL    Globulin 4.0 (H) 1.9 - 3.5 g/dL    A-G Ratio 0.6 g/dL   MAGNESIUM    Collection Time: 01/20/20 12:34 PM   Result Value Ref Range    Magnesium 2.8 (H) 1.5 - 2.5 mg/dL   PHOSPHORUS    Collection Time: 01/20/20 12:34 PM   Result Value Ref Range    Phosphorus 15.4 (HH) 2.5 - 4.5 mg/dL   TROPONIN    Collection Time: 01/20/20 12:34 PM   Result Value Ref Range    Troponin T 38 (H) 6 - 19 ng/L   APTT    Collection Time: 01/20/20 12:34 PM   Result Value Ref Range    APTT 63.4 (H) 24.7 - 36.0 sec   PROTHROMBIN TIME    Collection Time: 01/20/20 12:34 PM   Result Value Ref Range    PT 23.6 (H) 12.0 - 14.6 sec    INR 2.03 (H) 0.87 - 1.13   COD - Adult (Type and Screen)    Collection Time: 01/20/20 12:34 PM   Result Value Ref Range    ABO Grouping Only O     Rh  Grouping Only POS     Antibody Screen-Cod NEG     Component R       R4                  Red Blood Cells     [id number]   issued       20   16:03      Product Type Red Blood Cells LR Pheresis     Dispense Status issued     Unit Number (Barcoded) [id number]     Product Code (Barcoded) U5069B44     Blood Type (Barcoded) 9500    LACTIC ACID    Collection Time: 20 12:34 PM   Result Value Ref Range    Lactic Acid 13.2 (HH) 0.5 - 2.0 mmol/L   ESTIMATED GFR    Collection Time: 20 12:34 PM   Result Value Ref Range    GFR If  3 (A) >60 mL/min/1.73 m 2    GFR If Non African American 3 (A) >60 mL/min/1.73 m 2   PERIPHERAL SMEAR REVIEW    Collection Time: 20 12:34 PM   Result Value Ref Range    Peripheral Smear Review see below    PLATELET ESTIMATE    Collection Time: 20 12:34 PM   Result Value Ref Range    Plt Estimation Normal    MORPHOLOGY    Collection Time: 20 12:34 PM   Result Value Ref Range    RBC Morphology Present     Polychromia 2+     Poikilocytosis 1+     Ovalocytes 1+     Target Cells 1+     Tear Drop Cells 1+     Stomatocytes 1+     Rouleaux Slight    DIFFERENTIAL COMMENT    Collection Time: 20 12:34 PM   Result Value Ref Range    Comments-Diff see below    IRON/TOTAL IRON BIND    Collection Time: 20 12:34 PM   Result Value Ref Range    Iron 118 40 - 170 ug/dL    Total Iron Binding 139 (L) 250 - 450 ug/dL    % Saturation 85 (H) 15 - 55 %   UN-XM'D RBC    Collection Time: 20 12:39 PM   Result Value Ref Range    Component R       R99                 Red Cells, LR       [id number]   issued       20   12:38      Product Type R99     Dispense Status issued     Unit Number (Barcoded) [id number]     Product Code (Barcoded) R3394Y01     Blood Type (Barcoded) 9500     Component R       R99                 Red Cells, LR       [id number]   released     20   13:32      Product Type R99     Dispense Status /Released     Unit  Number (Barcoded) [id number]     Product Code (Barcoded) P2681Q48     Blood Type (Barcoded) 9500     Component R       R7                  Red Blood Cells7    [id number]   issued       01/20/20   13:35      Product Type Red Blood Cells LR Pheresis     Dispense Status issued     Unit Number (Barcoded) [id number]     Product Code (Barcoded) D4805I33     Blood Type (Barcoded) 9500    URINALYSIS    Collection Time: 01/20/20 12:51 PM   Result Value Ref Range    Color DK Yellow     Character Turbid (A)     Specific Gravity 1.024 <1.035    Ph 7.5 5.0 - 8.0    Glucose Negative Negative mg/dL    Ketones Negative Negative mg/dL    Protein 300 (A) Negative mg/dL    Bilirubin Large (A) Negative    Urobilinogen, Urine 1.0 Negative    Nitrite Positive (A) Negative    Leukocyte Esterase Small (A) Negative    Occult Blood Trace (A) Negative    Micro Urine Req Microscopic    PLATELET MAPPING WITH BASIC TEG    Collection Time: 01/20/20 12:51 PM   Result Value Ref Range    Reaction Time Initial-R 11.8 (H) 5.0 - 10.0 min    Clot Kinetics-K 1.5 1.0 - 3.0 min    Clot Angle-Angle 71.7 53.0 - 72.0 degrees    Maximum Clot Strength-MA 83.2 (H) 50.0 - 70.0 mm    Lysis 30 minutes-LY30 0.0 0.0 - 8.0 %    % Inhibition ADP 1.9 %    % Inhibition AA 0.0 %    TEG Algorithm Link Algorithm    AMMONIA    Collection Time: 01/20/20 12:51 PM   Result Value Ref Range    Ammonia 73 (H) 11 - 45 umol/L   URINE MICROSCOPIC (W/UA)    Collection Time: 01/20/20 12:51 PM   Result Value Ref Range    WBC 10-20 (A) /hpf    RBC 0-2 /hpf    Bacteria Many (A) None /hpf    Epithelial Cells Few /hpf    Hyaline Cast 0-2 /lpf   BASIC TEG W HEPARINASE    Collection Time: 01/20/20 12:51 PM   Result Value Ref Range    React Time Initial Hep 11.1 (H) 5.0 - 10.0 min    Clot Kinetics Heparinase 1.1 1.0 - 3.0 min    Clot Angle Heparinase 76.8 (H) 53.0 - 72.0 degrees    Max Clot Heparinase 79.0 (H) 50.0 - 70.0 mm    Lysis 30 min Heparinase 0.8 0.0 - 8.0 %   RETICULOCYTES  COUNT    Collection Time: 01/20/20 12:51 PM   Result Value Ref Range    Reticulocyte Count 9.4 (H) 0.8 - 2.1 %    Retic, Absolute 0.10 (H) 0.04 - 0.06 M/uL    Imm. Reticulocyte Fraction 21.4 (H) 9.3 - 17.4 %    Retic Hgb Equivalent 33.0 29.0 - 35.0 pg/cell   Prothrombin time (INR)    Collection Time: 01/20/20 12:51 PM   Result Value Ref Range    PT 24.0 (H) 12.0 - 14.6 sec    INR 2.07 (H) 0.87 - 1.13   Sed Rate    Collection Time: 01/20/20 12:51 PM   Result Value Ref Range    Sed Rate Westergren 41 (H) 0 - 30 mm/hour   Procalcitonin    Collection Time: 01/20/20 12:51 PM   Result Value Ref Range    Procalcitonin 13.59 (H) <0.25 ng/mL   CREATINE KINASE    Collection Time: 01/20/20 12:51 PM   Result Value Ref Range    CPK Total 337 (H) 0 - 154 U/L   LACTIC ACID    Collection Time: 01/20/20  1:21 PM   Result Value Ref Range    Lactic Acid 10.0 (HH) 0.5 - 2.0 mmol/L   ARTERIAL BLOOD GAS w/ O2 (LAB)    Collection Time: 01/20/20  1:21 PM   Result Value Ref Range    Ph 7.24 (LL) 7.40 - 7.50    Pco2 16.5 (L) 26.0 - 37.0 mmHg    Po2 198.1 (H) 64.0 - 87.0 mmHg    O2 Saturation 98.8 93.0 - 99.0 %    Hco3 7 (L) 17 - 25 mmol/L    Base Excess -19 (L) -4 - 3 mmol/L    Body Temp see below Centigrade    O2 Therapy 4.0 2.0 - 10.0 L/min   ABO Rh Confirm    Collection Time: 01/20/20  1:21 PM   Result Value Ref Range    ABO Rh Confirm O POS    FRESH FROZEN PLASMA    Collection Time: 01/20/20  1:45 PM   Result Value Ref Range    Component Ft       FPT                 Plasma, Thawed      [id number]   issued       01/20/20   15:45      Product Type Plasma  Thawed     Dispense Status issued     Unit Number (Barcoded) [id number]     Product Code (Barcoded) D4956S57     Blood Type (Barcoded) 5100    Influenza By PCR, A/B    Collection Time: 01/20/20  2:07 PM   Result Value Ref Range    Influenza virus A RNA Negative Negative    Influenza virus B, PCR Negative Negative   HGB (Hemoglobin) for 48 hours    Collection Time: 01/20/20  3:50 PM    Result Value Ref Range    Hemoglobin 6.4 (L) 12.0 - 16.0 g/dL   TROPONIN    Collection Time: 01/20/20  3:50 PM   Result Value Ref Range    Troponin T 35 (H) 6 - 19 ng/L   LACTIC ACID    Collection Time: 01/20/20  3:50 PM   Result Value Ref Range    Lactic Acid 10.7 (HH) 0.5 - 2.0 mmol/L   AMMONIA    Collection Time: 01/20/20  3:50 PM   Result Value Ref Range    Ammonia 70 (H) 11 - 45 umol/L       (click the triangle to expand results)    IMAGING: reviewed  CT-HEAD W/O   Final Result      1.  Cerebral atrophy.      2.  Otherwise, Head CT without contrast within normal limits. No evidence of acute cerebral infarction, hemorrhage or mass lesion.      DX-CHEST-PORTABLE (1 VIEW)   Final Result      No acute cardiac or pulmonary abnormalities are identified.      Right IJ central line in place with tip at the expected location of the SVC/right atrial junction.      US-RENAL    (Results Pending)   DX-CHEST-FOR LINE PLACEMENT Perform procedure in: Patient's Room    (Results Pending)       IMPRESSION:  GENESIS: Etiology likely prerenal, may be small contributing factor of NSAIDs use. UA reviewed, concerning for possible infection, no casts or crystals noted. Renal US pending. Check PCR and ACR. RRT indicated, intensivist agreeable to plan dialysis catheter. HD x 2.5 hours, slow flows to avoid over correction of BUN. IVF ordered, would run continuously. No UF with HD. Daily labs and weights, renally dose meds, strict I/O, no NSAIDs, no nephrotoxins. ASA pending, UDS negative    Hyperkalemia: medically managed in ED. HD ordered. D5W with 150meq sodium bicarbonate ordered.     Acidosis: LA elevated, AG 40. Bicarbonate drip as above, HD ordered. Hypotension, defer pressors to critical care    Septic shock: multifactorial. cultures pending, IVF ordered, pressors ordered. Abx per critical care    Alcohol withdrawal: CIWA per critical care    Hemorrhagic shock and GIB: PRBC and FFP per critical care, GI consult per critical care. See  septic shock.    Hyponatremia: avoid overcorrection, goal 4-6meq q 24 hours, receiving significant IVF. Will utilize 130 Na bath. Check metabolic panel in AM.    Hyperphosphatemia: will improve with HD, recheck in AM, no binders at this time    Abnormal liver function: note INR elevated, albumin low, ammonia elevated. Platelets wnl. AST elevated, ALT wnl. No documented h/o cirrhosis        Other management per primary service      All prior notes form other doctors and RN staff were reviewed from admission to current day to help me make my clinical decisions    Thank you for the consultation!

## 2020-01-21 NOTE — PROGRESS NOTES
Dr. Martinez updated on Calcium's critical. Will draw an ionized calcium. Gave patient some CaCl.

## 2020-01-22 PROBLEM — K70.10 ALCOHOLIC HEPATITIS WITHOUT ASCITES: Status: ACTIVE | Noted: 2020-01-01

## 2020-01-22 NOTE — CARE PLAN
Problem: Urinary Elimination:  Goal: Ability to reestablish a normal urinary elimination pattern will improve  Outcome: NOT MET  Intervention: Assess and monitor for signs and symptoms of urinary retention  Note:   Patient remains anuric      Problem: Pain Management  Goal: Pain level will decrease to patient's comfort goal  Outcome: PROGRESSING AS EXPECTED  Intervention: Follow pain managment plan developed in collaboration with patient and Interdisciplinary Team  Note:   Non-pharmacologic methods of pain management such as repositioning utilized. Patient medicated as appropriate.

## 2020-01-22 NOTE — PROGRESS NOTES
"  Gastroenterology Progress Note     Author: HECTOR Emery   Date & Time Created: 1/22/2020 8:23 AM    Chief Complaint:   Hematemesis, Hematochezia, Alcoholism.    Interval History:  EGD yesterday revealed LA grade D esophagitis with erosive esophageal ulcers, a small hiatal hernia, mild portal hypertensive gastropathy with some clots and blood throughout the stomach, but no varices.  AST and tBili worse today.  Patient feels \"ok\", has some lower quadrant abdominal pain, mild.  Tolerating small amounts of diet without nausea of vomiting.     Review of Systems:  Review of Systems   Constitutional: Positive for malaise/fatigue.   Gastrointestinal: Positive for abdominal pain. Negative for blood in stool, constipation, diarrhea, heartburn, melena, nausea and vomiting.   Neurological: Positive for weakness.       Physical Exam:  Physical Exam  Constitutional:       Appearance: She is ill-appearing.   HENT:      Head: Normocephalic.   Eyes:      Pupils: Pupils are equal, round, and reactive to light.   Cardiovascular:      Rate and Rhythm: Normal rate.      Pulses: Normal pulses.      Heart sounds: Normal heart sounds.   Pulmonary:      Effort: Pulmonary effort is normal. No respiratory distress.   Abdominal:      General: Abdomen is flat. Bowel sounds are normal. There is no distension.      Palpations: Abdomen is soft.      Tenderness: There is tenderness.   Skin:     General: Skin is warm and dry.   Neurological:      General: No focal deficit present.      Mental Status: She is alert and oriented to person, place, and time.   Psychiatric:         Mood and Affect: Mood normal.         Behavior: Behavior normal.         Thought Content: Thought content normal.         Judgment: Judgment normal.         Labs:  Recent Labs     01/20/20  1321 01/20/20  1714 01/20/20  2247   [id number] 7.24*  --   --    [id number] 16.5*  --   --    [id number] 198.1*  --   --    OJZX4AVH 98.8  --   --    ARTHCO3 7*  --   " --    [id number] 4.0  --   --    ARTBE -19*  --   --    ISTATAPH  --   --  7.598*   ISTATAPCO2  --   --  24.2*   ISTATAPO2  --   --  76   ISTATATCO2  --   --  24   [id number]  --   --  97   ISTATARTHCO3  --   --  23.7   ISTATARTBE  --   --  2   ISTATTEMP  --  33.2 C 36.9 C   ISTATFIO2  --   --  28   ISTATSPEC  --  Venous Arterial   ISTATAPHTC  --   --  7.599*   [id number]  --   --  75     Recent Labs     01/20/20  1251   CPKTOTAL 337*     Recent Labs     01/20/20  1234  01/21/20  0229 01/21/20  0840 01/22/20  0350   SODIUM 124*   < > 129* 127* 132*   POTASSIUM 6.9*   < > 3.7 3.9 4.3   CHLORIDE 77*   < > 85* 85* 87*   CO2 7*   < > 23 24 24   *   < > 78* 86* 89*   CREATININE 13.41*   < > 5.55* 5.98* 6.38*   MAGNESIUM 2.8*  --  1.8  --   --    PHOSPHORUS 15.4*  --  6.5*  --   --    CALCIUM 5.0*   < > 6.6* 6.8* 6.8*    < > = values in this interval not displayed.     Recent Labs     01/20/20  1234  01/21/20  0229 01/21/20  0840 01/21/20  1500 01/22/20  0350   ALTSGPT 23  --  23  --   --  28   ASTSGOT 145*  --  142*  --   --  162*   ALKPHOSPHAT 212*  --  197*  --   --  223*   TBILIRUBIN 7.9*  --  8.5*  --   --  9.5*   LIPASE  --   --   --  360* 374*  --    GLUCOSE 72   < > 253* 254*  --  171*    < > = values in this interval not displayed.     Recent Labs     01/20/20  1234 01/20/20  1251  01/21/20  0229 01/21/20  0519 01/21/20  1500 01/21/20 2125 01/22/20  0350   RBC 1.14*  --   --  2.46*  --   --   --   --    HEMOGLOBIN 4.0*  --    < > 7.9* 8.0* 8.0* 8.1* 8.2*   HEMATOCRIT 13.3*  --   --  23.0*  --   --   --   --    PLATELETCT 215  --   --  178  --   --   --   --    PROTHROMBTM 23.6* 24.0*  --   --  19.4*  --   --   --    APTT 63.4*  --   --   --   --   --   --   --    INR 2.03* 2.07*  --   --  1.58*  --   --   --    IRON 118  --   --   --   --   --   --   --    FERRITIN 1806.3*  --   --   --   --   --   --   --    TOTIRONBC 139*  --   --   --   --   --   --   --     < > = values in this interval not  displayed.     Recent Labs     20  1234 20  0229 20  0350   WBC 24.0* 20.7*  --    NEUTSPOLYS 92.20* 92.90*  --    LYMPHOCYTES 2.50* 1.50*  --    MONOCYTES 3.40 4.00  --    EOSINOPHILS 0.00 0.00  --    BASOPHILS 0.40 0.50  --    ASTSGOT 145* 142* 162*   ALTSGPT 23 23 28   ALKPHOSPHAT 212* 197* 223*   TBILIRUBIN 7.9* 8.5* 9.5*     Hemodynamics:  Temp (24hrs), Av.3 °C (97.4 °F), Min:36.3 °C (97.4 °F), Max:36.3 °C (97.4 °F)  Temperature: 36.3 °C (97.4 °F), Monitored Temp: 36.5 °C (97.7 °F)  Pulse  Av.1  Min: 67  Max: 117   Blood Pressure: (!) 78/45, Arterial BP: (!) 88/56     Respiratory:    Respiration: 18, Pulse Oximetry: 96 %     Work Of Breathing / Effort: Shallow;Mild  RUL Breath Sounds: Clear, RML Breath Sounds: Clear, RLL Breath Sounds: Diminished, NILDA Breath Sounds: Clear, LLL Breath Sounds: Diminished  Fluids:    Intake/Output Summary (Last 24 hours) at 2020 0823  Last data filed at 2020 0635  Gross per 24 hour   Intake 1446.8 ml   Output 0 ml   Net 1446.8 ml        GI/Nutrition:  Orders Placed This Encounter   Procedures   • Diet Order Full Liquid     Standing Status:   Standing     Number of Occurrences:   1     Order Specific Question:   Diet:     Answer:   Full Liquid [11]     Medical Decision Making, by Problem:  Active Hospital Problems    Diagnosis   • *Septic shock (HCC) [A41.9, R65.21]   • Hyperkalemia [E87.5]   • GENESIS (acute kidney injury) (HCC) [N17.9]   • Upper GI bleed [K92.2]   • Hemorrhagic shock (HCC) [R57.8]   • Seizure (HCC) [R56.9]   • Alcohol abuse [F10.10]   • Metabolic acidosis [E87.2]   • Lactic acidosis [E87.2]   • Coagulopathy (HCC) [D68.9]   • Elevated troponin [R79.89]   • Increased ammonia level [R79.89]       Plan:  1.  Protonix 40 mg b.i.d.  Continue PO PPI BID outpatient at discharge x 8 weeks.    2.  Follow up in the outpatient setting for repeat EGD in about 2 months.  3.  Follow up in the outpatient setting with [facility]  for further evaluation of her liver disease, advanced liver disease versus alcoholic hepatitis.  Elevated transaminases now likely related to alcoholic hepatitis vs liver shock.   4.  Ultrasound of the abdomen and portal vein to assess for portal hypertension to assess for abnormal contour of the liver pending.   5.  Continue to monitor liver function test.    Quality-Core Measures

## 2020-01-22 NOTE — PROGRESS NOTES
Nephrology Daily Progress Note    Date of Service  1/22/2020    HISTORY OF PRESENT ILLNESS:    53yo female BIB EMS to ED for altered mentation. Patient is unable to provide history 2/2 AMS. History obtained from her significant other (at bedside) and ED staff. Patient stopped consuming EtOH ~2 weeks ago after history of excessive use. She did not taper or utilize BZDs. She has had hematemesis x 1-2 months and dark tarry stools for at least 2 weeks. She also stopped smoking and significant other repeatedly denies use of any illicit substance, new medications, or supplements except protein shakes. She has been taking ~2 ibuprofen tabs daily for 2 weeks. EMS reported patient minimally responsive and moaning upon their arrival.      Patient is hypotensive in ED, SBP 60s-100s, responsive to IVF. Hgb 4 at presentation, provided with IVF. She has been placed on PPI. She is acidotic and has significant hyponatremia and hyperkalemia. She had been provided with NS prior to consultation.      No known h/o renal disease or DM. Possible HTN associated with EtOH use per significant other. SCr 0.82 7/2011     UDS negative, ASA pending    Interval Problem Update  01/20 - consult done, HD provided  01/21 - tolerated HD, Na, K, CO2, BUN improved, poor UOP, alert this AM, she confirms no ingestions, cessation of EtOH use after excessive use, use of ~400mg ibuprofen daily x 2 weeks, as well as hematemesis and melena PTA  01/22 - SCr increased, Na near normal, K wnl, acidosis improved, possible US vs MRI today, had EGD which demonstrated ulcerative esophagitis, small hiatal hernia, portal HTN gastropathy    Review of Systems  Review of Systems   Constitutional: Negative for chills and fever.   HENT: Negative for sinus pain and sore throat.    Eyes: Negative for pain and discharge.   Respiratory: Negative for cough and shortness of breath.    Cardiovascular: Negative for chest pain and leg swelling.   Gastrointestinal: Negative  for abdominal pain, diarrhea, melena, nausea and vomiting.   Genitourinary: Negative for dysuria, frequency, hematuria and urgency.   Musculoskeletal: Negative for back pain and myalgias.   Skin: Negative for itching and rash.   Neurological: Negative for focal weakness and seizures.   Endo/Heme/Allergies: Negative for environmental allergies. Does not bruise/bleed easily.   Psychiatric/Behavioral: Negative for memory loss. The patient is not nervous/anxious.         Physical Exam  Temp:  [36.3 °C (97.4 °F)] 36.3 °C (97.4 °F)  Pulse:  [] 86  Resp:  [8-37] 18  BP: (78)/(45) 78/45  SpO2:  [89 %-99 %] 96 %    Physical Exam  Constitutional:       General: She is not in acute distress.     Comments: Thin   HENT:      Head: Normocephalic and atraumatic.      Nose: Nose normal.      Mouth/Throat:      Mouth: Mucous membranes are dry.   Eyes:      General:         Right eye: No discharge.         Left eye: No discharge.      Extraocular Movements: Extraocular movements intact.   Cardiovascular:      Rate and Rhythm: Normal rate and regular rhythm.      Heart sounds: No murmur.   Pulmonary:      Effort: Pulmonary effort is normal.      Breath sounds: Normal breath sounds. No wheezing or rales.   Abdominal:      General: Abdomen is flat. There is no distension.      Palpations: Abdomen is soft.   Musculoskeletal:         General: Swelling (hips) present. No tenderness.   Skin:     General: Skin is warm and dry.   Neurological:      General: No focal deficit present.      Mental Status: She is alert and oriented to person, place, and time.   Psychiatric:         Mood and Affect: Mood normal.         Thought Content: Thought content normal.         Fluids    Intake/Output Summary (Last 24 hours) at 1/22/2020 0940  Last data filed at 1/22/2020 0800  Gross per 24 hour   Intake 4175.65 ml   Output 0 ml   Net 4175.65 ml       Laboratory  Recent Labs     01/20/20  1234  01/21/20  0229  01/21/20  1500 01/21/20  7039  01/22/20  0350   WBC 24.0*  --  20.7*  --   --   --   --    RBC 1.14*  --  2.46*  --   --   --   --    HEMOGLOBIN 4.0*   < > 7.9*   < > 8.0* 8.1* 8.2*   HEMATOCRIT 13.3*  --  23.0*  --   --   --   --    .7*  --  93.0  --   --   --   --    MCH 35.1*  --  32.1  --   --   --   --    MCHC 30.1*  --  34.3  --   --   --   --    RDW 86.9*  --  65.4*  --   --   --   --    PLATELETCT 215  --  178  --   --   --   --    MPV 12.0  --  12.1  --   --   --   --     < > = values in this interval not displayed.     Recent Labs     01/21/20  0229 01/21/20  0840 01/22/20  0350   SODIUM 129* 127* 132*   POTASSIUM 3.7 3.9 4.3   CHLORIDE 85* 85* 87*   CO2 23 24 24   GLUCOSE 253* 254* 171*   BUN 78* 86* 89*   CREATININE 5.55* 5.98* 6.38*   CALCIUM 6.6* 6.8* 6.8*     Recent Labs     01/20/20  1234 01/20/20  1251 01/21/20  0519   APTT 63.4*  --   --    INR 2.03* 2.07* 1.58*     No results for input(s): NTPROBNP in the last 72 hours.            Imaging     Renal US per radiology read (reviewed):   IMPRESSION:     1.  No hydronephrosis or renal abnormality.  2.  Bladder is decompressed by Velez catheter.    Assessment/Plan     GENESIS: Etiology likely prolonged prerenal state, may be small contributing factor of NSAIDs use. UA reviewed, concerning for possible infection, no casts (except 0-2 hyaline) or crystals noted. Will repeat if able. Renal US without abnormality. PCR and ACR pending. Will check ANCA, SPEP/FLC, complements, VARGHESE for completeness, but mostly likely ATN. Can consider renal biopsy if no improvement in next several days. No IVF at this time. On pressor support. Preferable to keep MAP>60, SBP>110. Will start midodrine 5mg TID. Daily labs and weights, renally dose meds, strict I/O, no NSAIDs, no nephrotoxins. ASA pending, UDS negative. No HD today unless MRI with jamee to be done today. Hep panel reviewed.      Hyperkalemia: resolved, monitor     Acidosis: improved     Septic and hemorrhagic shock: per ICU     upper GIB: Hgb  improved, on PPI     Hyponatremia: Improved.     Hyperphosphatemia: improved, no binders at this time    Hypocalcemia: replete PRN. Check PTH and vitamin D     Probable chronic liver disease and associated coagulopathy: note INR elevated, albumin low, ammonia elevated. Platelets wnl. AST elevated, ALT wnl. No documented h/o cirrhosis      Long discussion with patient and her significant other regarding plan           Other management per primary service        All prior notes form other physicians and RN staff were reviewed from admission to current day to help me make my clinical decisions    PMH/PSH/FH/SH reviewed and unchanged  Medications and labs reviewed

## 2020-01-22 NOTE — ASSESSMENT & PLAN NOTE
One episode of possible seizure on the evening on 1/20. No further episodes noted on 1/21 until present  Pt alert, oriented.   Ativan 2-4 mg IV [id number] prn seizures only

## 2020-01-22 NOTE — PROGRESS NOTES
Assumed care of patient. Report received from ANANYA Chester. POC reviewed with patient and family. All questions answered.

## 2020-01-22 NOTE — ASSESSMENT & PLAN NOTE
End-stage liver disease with very high meld score predicting extremely high 90-day mortality rate as discussed.  Unfortunately patient does not appear to have any correctable features.  Continue aggressive supportive care as we discuss goals of care with family and get a second opinion regarding prognosis

## 2020-01-22 NOTE — PROGRESS NOTES
"Critical Care Progress Note    Date of admission  1/20/2020    Chief Complaint  AMS, hematemesis    Hospital Course    54 y.o. female with hx of alcohol abuse, hx of recent pancreatitis, upper GI bleed 2 months ago, who presented 1/20/2020 with altered mental status and abd pain. Pt was actively drinking until 2 weeks ago where she quit \"cold turkey\" per sundeep. History obtained from  and medical record. Since then, pt has been having N/V and in the past few days, pt developed progressive weakness and AMS. Sundeep reported small amount of blood in her emesis as well as in her stools. Pt was brought to ED. At ED, pt was hypotensive with BP in 60/40s. Also reported ED noted melanotic stools on the sheets and some blood around her mouth. Pt received 3L crystalloid fluid boluses. Lactate 13, creatinine 13, K 6.9. HCO3 7. Ceftriaxone started. Blood cultures obtained. INR 2.0, plt 215K     HyperK cocktail was given. Nephrology was consulted for emergent dialysis. Hgb 4.0, received 2U uncrossmatched PRBCs with 1U plat and 1U FFP. Per ED, pt's mental status is somewhat improved with fluids, but pt still moaning while I got there. Pt c/o epigastric pain. On 3L NC with sat >90%. SBP in 100s after fluid boluses.    1/20 s/p emergent dialysis  1/22 s/p EGD: ulcerative esophagitis, mild portal HTN gastropathy, small hiatus hernia.       Interval Problem Update  Reviewed last 24 hour events:  Remains critically ill  No acute events overnight.   S/p EGD 1/21  Afebrile Tm 36.9C  MAP >60, HR in 80s  On 2 pressors, NE at 10, vasopressin at 0.03  Hgb stable in 7- 8s. No further PRBC transfusions required.   K is 4.3 this am.   Lactic acid continues to improve 2.7    Review of Systems  Review of Systems   Reason unable to perform ROS: limited due to mental status.        Vital Signs for last 24 hours   Temp:  [36.3 °C (97.4 °F)] 36.3 °C (97.4 °F)  Pulse:  [] 85  Resp:  [8-37] 18  BP: (78)/(45) 78/45  SpO2:  [89 %-99 %] 98 " %    Hemodynamic parameters for last 24 hours       Respiratory Information for the last 24 hours       Physical Exam   Physical Exam  Vitals signs and nursing note reviewed.   Constitutional:       General: She is not in acute distress.     Appearance: She is obese. She is ill-appearing and toxic-appearing. She is not diaphoretic.   HENT:      Head: Normocephalic and atraumatic.      Mouth/Throat:      Mouth: Mucous membranes are dry.   Eyes:      General: Scleral icterus present.      Pupils: Pupils are equal, round, and reactive to light.   Neck:      Musculoskeletal: Neck supple.   Cardiovascular:      Rate and Rhythm: Normal rate and regular rhythm.      Pulses: Normal pulses.      Heart sounds: Normal heart sounds. No murmur.   Pulmonary:      Effort: No respiratory distress.      Breath sounds: Normal breath sounds. No wheezing, rhonchi or rales.   Abdominal:      General: There is no distension.      Palpations: Abdomen is soft.      Tenderness: There is no tenderness. There is no guarding.   Musculoskeletal:      Right lower leg: No edema.      Left lower leg: No edema.   Skin:     Coloration: Skin is not jaundiced.      Findings: No bruising or rash.   Neurological:      General: No focal deficit present.      Mental Status: She is alert and oriented to person, place, and time.         Medications  Current Facility-Administered Medications   Medication Dose Route Frequency Provider Last Rate Last Dose   • thiamine (B-1) 200 mg in D5W 100 mL IVPB  200 mg Intravenous BID Keith Montenegro D.O. 200 mL/hr at 01/21/20 1834 200 mg at 01/21/20 1834   • phytonadione (AQUA-MEPHYTON) 10 mg in NS 50 mL IVPB  10 mg Intravenous DAILY Keith Montenegro D.O.   Stopped at 01/22/20 0635   • LORazepam (ATIVAN) injection 2-4 mg  2-4 mg Intravenous Q HOUR PRN Keith Montenegro D.O.       • insulin regular (HUMULIN R) injection 1-6 Units  1-6 Units Subcutaneous Q6HRS REILLY GonzalesO.   1 Units at 01/22/20 0548    And   • glucose 4 g chewable  tablet 16 g  16 g Oral Q15 MIN PRN Keith Montenegro D.O.        And   • DEXTROSE 10% BOLUS 250 mL  250 mL Intravenous Q15 MIN PRN Keith Montenegro D.O.       • pantoprazole (PROTONIX) injection 40 mg  40 mg Intravenous BID REILLY GonzalesO.   40 mg at 01/22/20 0542   • mupirocin (BACTROBAN) 2 % ointment   Topical BID REILLY GonzalesOTucker   2 % at 01/22/20 0542   • morphine (pf) 4 MG/ML injection 2 mg  2 mg Intravenous Q HOUR PRN Toy Martinez M.D.   2 mg at 01/21/20 2045   • norepinephrine (LEVOPHED) 8 mg in  mL Infusion  0-30 mcg/min Intravenous Continuous Noé Mark M.D. 18.8 mL/hr at 01/22/20 0201 10 mcg/min at 01/22/20 0201   • Pharmacy Consult Request - to monitor for nephrotoxic agents  1 Each Other PHARMACY TO DOSE Anthony Pardo D.O.       • Respiratory Care per Protocol   Nebulization Continuous RT Anthony Pardo D.O.       • lactated ringers infusion (BOLUS): BMI less than or equal to 30  30 mL/kg Intravenous Once PRN Anthony Pardo D.O.       • thiamine tablet 100 mg  100 mg Oral DAILY REILLY MurphyOTucker   100 mg at 01/22/20 0536    And   • folic acid (FOLVITE) tablet 1 mg  1 mg Oral DAILY REILLY MurphyOTucker   1 mg at 01/22/20 0537    And   • multivitamin (THERAGRAN) tablet 1 Tab  1 Tab Oral DAILY REILLY MurphyO.   1 Tab at 01/22/20 0537   • piperacillin-tazobactam (ZOSYN) 4.5 g in  mL IVPB  4.5 g Intravenous Q12HRS Keith Montenegro D.O.   Stopped at 01/22/20 0325   • vasopressin (VASOSTRICT) 20 Units in  mL Infusion  0.03 Units/min Intravenous Continuous Keith Montenegro D.O. 9 mL/hr at 01/21/20 2258 0.03 Units/min at 01/21/20 2258   • hydrocortisone sodium succinate PF (SOLU-CORTEF) 100 MG injection 50 mg  50 mg Intravenous Q6HRS OPAL Gonzales.OTucker   50 mg at 01/22/20 0532   • heparin injection 2,400 Units  2,400 Units Intracatheter DIALYSIS PRN REILLY SchwartzOTucker   2,400 Units at 01/20/20 1945       Fluids    Intake/Output Summary (Last 24 hours) at 1/22/2020 0634  Last data filed at  1/22/2020 0325  Gross per 24 hour   Intake 1714.21 ml   Output 0 ml   Net 1714.21 ml       Laboratory  Recent Labs     01/20/20  1321 01/20/20  1714 01/20/20  2247   RPZNH24Z 7.24*  --   --    ONPHRX602W 16.5*  --   --    IHIKQ316H 198.1*  --   --    KPGW1TCC 98.8  --   --    ARTHCO3 7*  --   --    [id number] 4.0  --   --    ARTBE -19*  --   --    ISTATAPH  --   --  7.598*   ISTATAPCO2  --   --  24.2*   ISTATAPO2  --   --  76   ISTATATCO2  --   --  24   [id number]  --   --  97   ISTATARTHCO3  --   --  23.7   ISTATARTBE  --   --  2   ISTATTEMP  --  33.2 C 36.9 C   ISTATFIO2  --   --  28   ISTATSPEC  --  Venous Arterial   ISTATAPHTC  --   --  7.599*   EFQQUOBA2GQ  --   --  75     Recent Labs     01/20/20  1251   CPKTOTAL 337*     Recent Labs     01/20/20  1234  01/21/20  0229 01/21/20  0840 01/22/20  0350   SODIUM 124*   < > 129* 127* 132*   POTASSIUM 6.9*   < > 3.7 3.9 4.3   CHLORIDE 77*   < > 85* 85* 87*   CO2 7*   < > 23 24 24   *   < > 78* 86* 89*   CREATININE 13.41*   < > 5.55* 5.98* 6.38*   MAGNESIUM 2.8*  --  1.8  --   --    PHOSPHORUS 15.4*  --  6.5*  --   --    CALCIUM 5.0*   < > 6.6* 6.8* 6.8*    < > = values in this interval not displayed.     Recent Labs     01/20/20  1234  01/21/20  0229 01/21/20  0840 01/21/20  1500 01/22/20  0350   ALTSGPT 23  --  23  --   --  28   ASTSGOT 145*  --  142*  --   --  162*   ALKPHOSPHAT 212*  --  197*  --   --  223*   TBILIRUBIN 7.9*  --  8.5*  --   --  9.5*   LIPASE  --   --   --  360* 374*  --    GLUCOSE 72   < > 253* 254*  --  171*    < > = values in this interval not displayed.     Recent Labs     01/20/20  1234 01/21/20  0229 01/22/20  0350   WBC 24.0* 20.7*  --    NEUTSPOLYS 92.20* 92.90*  --    LYMPHOCYTES 2.50* 1.50*  --    MONOCYTES 3.40 4.00  --    EOSINOPHILS 0.00 0.00  --    BASOPHILS 0.40 0.50  --    ASTSGOT 145* 142* 162*   ALTSGPT 23 23 28   ALKPHOSPHAT 212* 197* 223*   TBILIRUBIN 7.9* 8.5* 9.5*     Recent Labs     01/20/20  1234 01/20/20  1251   01/21/20  0229 01/21/20  0519 01/21/20  1500 01/21/20  2125 01/22/20  0350   RBC 1.14*  --   --  2.46*  --   --   --   --    HEMOGLOBIN 4.0*  --    < > 7.9* 8.0* 8.0* 8.1* 8.2*   HEMATOCRIT 13.3*  --   --  23.0*  --   --   --   --    PLATELETCT 215  --   --  178  --   --   --   --    PROTHROMBTM 23.6* 24.0*  --   --  19.4*  --   --   --    APTT 63.4*  --   --   --   --   --   --   --    INR 2.03* 2.07*  --   --  1.58*  --   --   --    IRON 118  --   --   --   --   --   --   --    FERRITIN 1806.3*  --   --   --   --   --   --   --    TOTIRONBC 139*  --   --   --   --   --   --   --     < > = values in this interval not displayed.       Imaging  X-Ray:  I have personally reviewed the images and compared with prior images.    Assessment/Plan  * Septic shock (HCC)  Assessment & Plan  This is Septic shock Present on admission  SIRS criteria identified on my evaluation include: Hypothermia, with temperature less than 96.8 deg F, Tachycardia, with heart rate greater than 90 BPM, Tachypnea, with respirations greater than 20 per minute and Leukocyosis, with WBC greater than 12,000  Source is Upper GI bleed, GI  Presentation includes: Severe sepsis present and initial Lactate level result is >= 4 mmol/L.   Despite appropriate fluid resuscitation with crystalloid given per sepsis guidelines, the patient remains hypotensive with systolic blood pressure less than 90 or MAP less than 65  Hemodynamic support with additional fluids and IV vasopressors as needed to maintain a SBP of 90 or MAP of 65  IV antibiotics as appropriate for source of sepsis  Reassessment: I have reassessed the patient's hemodynamic status    Continue norepinephrine gtt and vasopressin to actively titrate to goal MAP >60  Serial lactates until lactate <2. I expect decrease lactate clearance due to her liver dysfunction .   Continue piptazo.   Nasal MRSA screen positive, decolonize with mupirocin BID x 5 days.   Follow up blood cultures  Continue  hydrocortisone 50 Q6H x 5days  Thiamine 200 IV Q12H x 5 days  CT A/P to rule out any intraabdominal source. Noted hypodense lesions in the liver. Will need contrast to better characterize the lesions  Will check US RUQ to differentiate if fluid/mass     Hemorrhagic shock (HCC)  Assessment & Plan  Due to Upper GI bleed, in the setting of active alcohol abuse.   S/p 4U PRBCs, 1U FFP on 1/20  Bleeding seems to have stopped.   Hgb has been stable in 48 hours with Hgb in 8s.   Can check CBC daily   Transfuse to keep Hgb >7      Upper GI bleed  Assessment & Plan  Evidence of hematemesis and melena at admission  Pt's Hgb 4.0 at admission   S/p 4U PRBCs and 2U FFPs  EGD today by GI and noted ulcerative esophagitis. No esophageal varices  H/H Q6H transfuse to keep Hgb >7  Continue protonix 40 BID  Ionized calcium is still low and will replete calcium chloride.   Advance diet as tolerated  Repeat iron studies in 3 days.     GENESIS (acute kidney injury) (HCC)  Assessment & Plan  Likely due to ATN, now with life threatening hyperkalemia  Bedside US without evidence of hydronephrosis  Received 3L fluid boluses.   S/p emergent dialysis 1/20    Hyperkalemia  Assessment & Plan  Life threatening hyperkalemia   S/p hyperK cocktail at ED. No ECG changes in ED  Neph consulted  Emergent dialysis  1/20    Seizure (HCC)  Assessment & Plan  One episode of possible seizure on the evening on 1/20. No further episodes noted on 1/21 and 1/22  Pt alert, oriented.   Ativan 2-4 mg IV [id number] prn seizures only     Coagulopathy (HCC)  Assessment & Plan  Liver related  TEG with prolonged R, MA ok  INR 2.0  Plt in 200s, fibrinogen in 500s  Completed vitamin K 10mg IV daily x 3 days 1/20-1/22      Lactic acidosis  Assessment & Plan  High lactate at 13 due to septic shock, hemorrhagic shock, in the setting of alcoholic abuse  Significantly improved, last was 3.5  Continue serial lactates until lactate <2  Change lactate Q6H to daily     Metabolic  acidosis  Assessment & Plan  Due to lactic acidosis  Resolved. Bicarb gtt was stopped    Alcohol abuse  Assessment & Plan  Per sundeep and patient, quit 2 weeks ago  Banana bag with thiamine, mag, multivitamin completed  Urine drug screen negative   Pt not in withdrawal. CIWA protocol was discontinued 1/21    Alcoholic hepatitis without ascites  Assessment & Plan  LFT suggests alcoholic hepatitis  AST >ALT, Total bili is slowly rising  Maddrey DF score is 39 which he will benefit from steroids.   Currently on hydrocortisone 50 Q6H, will transition to prednisolone soon  Lactulose 30 BID to prevent hepatic encephalopathy.          VTE:  Contraindicated  Ulcer: PPI  Lines: Central Line  Ongoing indication addressed, Arterial Line  Ongoing indication addressed and Velez Catheter  Ongoing indication addressed    I have performed a physical exam and reviewed and updated ROS and Plan today (1/22/2020). In review of yesterday's note (1/21/2020), there are no changes except as documented above.     Discussed patient condition and risk of morbidity and/or mortality with Family, RN, RT, Pharmacy, Charge nurse / hot rounds and Patient  The patient remains critically ill.  Critical care time = 50 minutes in directly providing and coordinating critical care and extensive data review.  No time overlap and excludes procedures.

## 2020-01-23 NOTE — PROGRESS NOTES
[facility] Progress Note    Hemodialysis treatment ordered today per Dr. Day x 3 hours. Treatment initiated at 1243 and ended at 1543    Pt on 2 pressors for BP support, BP stable throughout treatment, VSS post HD.; see paper flow sheets for details.    Net UF 1,000 mL    Post tx, CVC flushed with saline then locked with heparin 1000 units/mL per designated amount in each wing then clamped and capped. Aspirate heparin prior to next CVC use.    Report given to Primary RN.

## 2020-01-23 NOTE — PROGRESS NOTES
Assumed care of patient. Report received from ANANYA Conti. POC reviewed with patient and family. All questions answered.

## 2020-01-23 NOTE — PROGRESS NOTES
"Critical Care Progress Note    Date of admission  1/20/2020    Chief Complaint  AMS, hematemesis    Hospital Course    54 y.o. female with hx of alcohol abuse, hx of recent pancreatitis, upper GI bleed 2 months ago, who presented 1/20/2020 with altered mental status and abd pain. Pt was actively drinking until 2 weeks ago where she quit \"cold turkey\" per sundeep. History obtained from  and medical record. Since then, pt has been having N/V and in the past few days, pt developed progressive weakness and AMS. Sundeep reported small amount of blood in her emesis as well as in her stools. Pt was brought to ED. At ED, pt was hypotensive with BP in 60/40s. Also reported ED noted melanotic stools on the sheets and some blood around her mouth. Pt received 3L crystalloid fluid boluses. Lactate 13, creatinine 13, K 6.9. HCO3 7. Ceftriaxone started. Blood cultures obtained. INR 2.0, plt 215K     HyperK cocktail was given. Nephrology was consulted for emergent dialysis. Hgb 4.0, received 2U uncrossmatched PRBCs with 1U plat and 1U FFP. Per ED, pt's mental status is somewhat improved with fluids, but pt still moaning while I got there. Pt c/o epigastric pain. On 3L NC with sat >90%. SBP in 100s after fluid boluses.    1/20 s/p emergent dialysis  1/22 s/p EGD: ulcerative esophagitis, mild portal HTN gastropathy, small hiatus hernia.       Interval Problem Update  Reviewed last 24 hour events:  Remains critically ill  No acute events overnight.   Remains on pressors, NE down to 7, vasopressin  Pt had an episode of brief hypoxia in the afternoon, but this was quickly corrected with NRB. Oxygenation then was weaned to oxymask  Afebrile Tm 36.2C  MAP >60, HR in 70s  Hgb stable in 7- 8s. No further PRBC transfusions required.   K is 4.3 this am.   Lactic acid continues to improve 1.7  WBC 27K  No urine output    Review of Systems  Review of Systems   Reason unable to perform ROS: limited due to mental status.        Vital Signs " for last 24 hours   Pulse:  [78-93] 79  Resp:  [8-18] 13  BP: ()/(54-65) 87/59  SpO2:  [94 %-98 %] 97 %    Hemodynamic parameters for last 24 hours       Respiratory Information for the last 24 hours       Physical Exam   Physical Exam  Vitals signs and nursing note reviewed.   Constitutional:       General: She is not in acute distress.     Appearance: She is ill-appearing and toxic-appearing. She is not diaphoretic.   HENT:      Head: Normocephalic and atraumatic.      Mouth/Throat:      Mouth: Mucous membranes are dry.   Eyes:      General: Scleral icterus present.      Pupils: Pupils are equal, round, and reactive to light.   Neck:      Musculoskeletal: Neck supple.   Cardiovascular:      Rate and Rhythm: Normal rate and regular rhythm.      Pulses: Normal pulses.      Heart sounds: Normal heart sounds. No murmur.   Pulmonary:      Effort: No respiratory distress.      Breath sounds: Normal breath sounds. No wheezing, rhonchi or rales.   Abdominal:      General: There is no distension.      Palpations: Abdomen is soft.      Tenderness: There is no tenderness. There is no guarding.   Musculoskeletal:      Right lower leg: No edema.      Left lower leg: No edema.   Skin:     Coloration: Skin is not jaundiced.      Findings: No bruising or rash.   Neurological:      General: No focal deficit present.      Mental Status: She is alert and oriented to person, place, and time.         Medications  Current Facility-Administered Medications   Medication Dose Route Frequency Provider Last Rate Last Dose   • lactulose 20 GM/30ML solution 30 mL  30 mL Oral BID Keith Montenegro D.O.   30 mL at 01/23/20 0536   • midodrine (PROAMATINE) tablet 5 mg  5 mg Oral Q8HRS Siri Day D.O.   5 mg at 01/23/20 0536   • thiamine (B-1) 200 mg in D5W 100 mL IVPB  200 mg Intravenous BID Keith Montenegro D.O. 200 mL/hr at 01/23/20 0647 200 mg at 01/23/20 0647   • LORazepam (ATIVAN) injection 2-4 mg  2-4 mg Intravenous Q HOUR PRN Keith  SANJEEV Montenegro       • insulin regular (HUMULIN R) injection 1-6 Units  1-6 Units Subcutaneous Q6HRS REILLY GonzalesOTucker   Stopped at 01/22/20 1800    And   • glucose 4 g chewable tablet 16 g  16 g Oral Q15 MIN PRN REILLY GonzalesOTucker        And   • DEXTROSE 10% BOLUS 250 mL  250 mL Intravenous Q15 MIN PRN REILLY GonzalesO.       • pantoprazole (PROTONIX) injection 40 mg  40 mg Intravenous BID OPAL Gonzales.O.   40 mg at 01/23/20 0536   • mupirocin (BACTROBAN) 2 % ointment   Topical BID REILLY GonzalesOTucker   2 % at 01/23/20 0535   • morphine (pf) 4 MG/ML injection 2 mg  2 mg Intravenous Q HOUR PRN Toy Martinez M.D.   2 mg at 01/22/20 0815   • norepinephrine (LEVOPHED) 8 mg in  mL Infusion  0-30 mcg/min Intravenous Continuous Keith Montenegro D.O. 13.1 mL/hr at 01/22/20 1725 7 mcg/min at 01/22/20 1725   • Pharmacy Consult Request - to monitor for nephrotoxic agents  1 Each Other PHARMACY TO DOSE Anthony Pardo D.O.       • Respiratory Care per Protocol   Nebulization Continuous RT Anthony Pardo D.O.       • lactated ringers infusion (BOLUS): BMI less than or equal to 30  30 mL/kg Intravenous Once PRN Anthony Pardo D.O.       • thiamine tablet 100 mg  100 mg Oral DAILY REILLY MurphyOTucker   100 mg at 01/23/20 0536    And   • folic acid (FOLVITE) tablet 1 mg  1 mg Oral DAILY REILLY MurphyOTucker   1 mg at 01/23/20 0536    And   • multivitamin (THERAGRAN) tablet 1 Tab  1 Tab Oral DAILY Anthony Pardo D.O.   1 Tab at 01/23/20 0536   • piperacillin-tazobactam (ZOSYN) 4.5 g in  mL IVPB  4.5 g Intravenous Q12HRS REILLY GonzalesOTucker   Stopped at 01/23/20 0257   • vasopressin (VASOSTRICT) 20 Units in  mL Infusion  0.03 Units/min Intravenous Continuous Keith Montenegro D.O. 9 mL/hr at 01/23/20 0114 0.03 Units/min at 01/23/20 0114   • hydrocortisone sodium succinate PF (SOLU-CORTEF) 100 MG injection 50 mg  50 mg Intravenous Q6HRS REILLY GonzalesO.   50 mg at 01/23/20 0536   • heparin injection 2,400 Units  2,400 Units  Intracatheter DIALYSIS PRN Siri Day D.O.   2,400 Units at 01/20/20 1945       Fluids    Intake/Output Summary (Last 24 hours) at 1/23/2020 0647  Last data filed at 1/23/2020 0600  Gross per 24 hour   Intake 1210.34 ml   Output 0 ml   Net 1210.34 ml       Laboratory  Recent Labs     01/20/20  1321 01/20/20  1714 01/20/20  2247   REYOY54X 7.24*  --   --    QGQSJQ370J 16.5*  --   --    EHKZS249A 198.1*  --   --    KRQZ7GIN 98.8  --   --    ARTHCO3 7*  --   --    S2RANSPEV 4.0  --   --    ARTBE -19*  --   --    ISTATAPH  --   --  7.598*   ISTATAPCO2  --   --  24.2*   ISTATAPO2  --   --  76   ISTATATCO2  --   --  24   [id number]  --   --  97   ISTATARTHCO3  --   --  23.7   ISTATARTBE  --   --  2   ISTATTEMP  --  33.2 C 36.9 C   ISTATFIO2  --   --  28   ISTATSPEC  --  Venous Arterial   ISTATAPHTC  --   --  7.599*   [id number]  --   --  75     Recent Labs     01/20/20  1251   CPKTOTAL 337*     Recent Labs     01/20/20  1234  01/21/20  0229 01/21/20  0840 01/22/20  0350   SODIUM 124*   < > 129* 127* 132*   POTASSIUM 6.9*   < > 3.7 3.9 4.3   CHLORIDE 77*   < > 85* 85* 87*   CO2 7*   < > 23 24 24   *   < > 78* 86* 89*   CREATININE 13.41*   < > 5.55* 5.98* 6.38*   MAGNESIUM 2.8*  --  1.8  --   --    PHOSPHORUS 15.4*  --  6.5*  --   --    CALCIUM 5.0*   < > 6.6* 6.8* 6.8*    < > = values in this interval not displayed.     Recent Labs     01/20/20  1234  01/21/20 0229 01/21/20  0840 01/21/20  1500 01/22/20  0350   ALTSGPT 23  --  23  --   --  28   ASTSGOT 145*  --  142*  --   --  162*   ALKPHOSPHAT 212*  --  197*  --   --  223*   TBILIRUBIN 7.9*  --  8.5*  --   --  9.5*   LIPASE  --   --   --  360* 374*  --    GLUCOSE 72   < > 253* 254*  --  171*    < > = values in this interval not displayed.     Recent Labs     01/20/20  1234 01/21/20 0229 01/22/20  0350 01/23/20  0530   WBC 24.0* 20.7*  --  27.0*   NEUTSPOLYS 92.20* 92.90*  --  96.60*   LYMPHOCYTES 2.50* 1.50*  --  3.40*   MONOCYTES 3.40 4.00  --  0.00    EOSINOPHILS 0.00 0.00  --  0.00   BASOPHILS 0.40 0.50  --  0.00   ASTSGOT 145* 142* 162*  --    ALTSGPT 23 23 28  --    ALKPHOSPHAT 212* 197* 223*  --    TBILIRUBIN 7.9* 8.5* 9.5*  --      Recent Labs     01/20/20  1234 01/20/20  1251  01/21/20  0229 01/21/20  0519  01/21/20  2125 01/22/20  0350 01/23/20  0530   RBC 1.14*  --   --  2.46*  --   --   --   --  2.60*   HEMOGLOBIN 4.0*  --    < > 7.9* 8.0*   < > 8.1* 8.2* 8.5*   HEMATOCRIT 13.3*  --   --  23.0*  --   --   --   --  25.9*   PLATELETCT 215  --   --  178  --   --   --   --  127*   PROTHROMBTM 23.6* 24.0*  --   --  19.4*  --   --   --   --    APTT 63.4*  --   --   --   --   --   --   --   --    INR 2.03* 2.07*  --   --  1.58*  --   --   --   --    IRON 118  --   --   --   --   --   --   --   --    FERRITIN 1806.3*  --   --   --   --   --   --   --   --    TOTIRONBC 139*  --   --   --   --   --   --   --   --     < > = values in this interval not displayed.       Imaging  X-Ray:  I have personally reviewed the images and compared with prior images.    Assessment/Plan  * Septic shock (HCC)  Assessment & Plan  This is Septic shock Present on admission  SIRS criteria identified on my evaluation include: Hypothermia, with temperature less than 96.8 deg F, Tachycardia, with heart rate greater than 90 BPM, Tachypnea, with respirations greater than 20 per minute and Leukocyosis, with WBC greater than 12,000  Source is Upper GI bleed, GI  Presentation includes: Severe sepsis present and initial Lactate level result is >= 4 mmol/L.   Despite appropriate fluid resuscitation with crystalloid given per sepsis guidelines, the patient remains hypotensive with systolic blood pressure less than 90 or MAP less than 65  Hemodynamic support with additional fluids and IV vasopressors as needed to maintain a SBP of 90 or MAP of 65  IV antibiotics as appropriate for source of sepsis  Reassessment: I have reassessed the patient's hemodynamic status    Continue norepinephrine gtt  and vasopressin to actively titrate to goal MAP >60  Serial lactates until lactate <2. I expect decrease lactate clearance due to her liver dysfunction .   Continue piptazo.   Nasal MRSA screen positive, decolonize with mupirocin BID x 5 days.   Follow up blood cultures  Continue hydrocortisone 50 Q6H x 5days  Thiamine 200 IV Q12H x 5 days  CT A/P to rule out any intraabdominal source. Noted hypodense lesions in the liver. Will need contrast to better characterize the lesions  US RUQ did not show any lesions.   Pt had MRI w/o contrast with diffusion and also did not show any evidence of hepatic lesions. Liver lesions noted on CT is likely from fatty infiltrtation       Hemorrhagic shock (HCC)  Assessment & Plan  Due to Upper GI bleed, in the setting of active alcohol abuse.   S/p 4U PRBCs, 1U FFP on 1/20  Bleeding seems to have stopped.   Hgb has been stable in 48 hours with Hgb in 8s.   Can check CBC daily   Transfuse to keep Hgb >7      Upper GI bleed  Assessment & Plan  Evidence of hematemesis and melena at admission  Pt's Hgb 4.0 at admission   S/p 4U PRBCs and 2U FFPs  EGD today by GI and noted ulcerative esophagitis. No esophageal varices  H/H Q6H transfuse to keep Hgb >7  Continue protonix 40 BID  Ionized calcium is still low and will replete calcium chloride.   Advance diet as tolerated  Repeat iron studies in 3 days.     GENESIS (acute kidney injury) (HCC)  Assessment & Plan  Likely due to ATN, now with life threatening hyperkalemia  Bedside US without evidence of hydronephrosis  Received 3L fluid boluses.   S/p emergent dialysis 1/20    Hyperkalemia  Assessment & Plan  Life threatening hyperkalemia   S/p hyperK cocktail at ED. No ECG changes in ED  Neph consulted  Emergent dialysis  1/20    Seizure (HCC)  Assessment & Plan  One episode of possible seizure on the evening on 1/20. No further episodes noted on 1/21 and 1/22  Pt alert, oriented.   Ativan 2-4 mg IV [id number] prn seizures only     Coagulopathy  (HCC)  Assessment & Plan  Liver related  TEG with prolonged R, MA ok  INR 2.0  Plt in 200s, fibrinogen in 500s  Completed vitamin K 10mg IV daily x 3 days 1/20-1/22      Lactic acidosis  Assessment & Plan  High lactate at 13 due to septic shock, hemorrhagic shock, in the setting of alcoholic abuse  Significantly improved, last was 3.5  Continue serial lactates until lactate <2  Change lactate Q6H to daily     Metabolic acidosis  Assessment & Plan  Due to lactic acidosis  Resolved. Bicarb gtt was stopped    Alcohol abuse  Assessment & Plan  Per sundeep and patient, quit 2 weeks ago  Banana bag with thiamine, mag, multivitamin completed  Urine drug screen negative   Pt not in withdrawal. CIWA protocol was discontinued 1/21    Alcoholic hepatitis without ascites  Assessment & Plan  LFT suggests alcoholic hepatitis  AST >ALT, Total bili is slowly rising  Maddrey DF score is 39 which he will benefit from steroids.   Currently on hydrocortisone 50 Q6H, will complete for 5 days, then transitioned to prednisolone 40mg daily x total 21 days   Lactulose 30 BID prn to prevent hepatic encephalopathy.          VTE:  Contraindicated  Ulcer: PPI  Lines: Central Line  Ongoing indication addressed, Arterial Line  Ongoing indication addressed and Velez Catheter  Ongoing indication addressed    I have performed a physical exam and reviewed and updated ROS and Plan today (1/23/2020). In review of yesterday's note (1/22/2020), there are no changes except as documented above.     Discussed patient condition and risk of morbidity and/or mortality with Family, RN, RT, Pharmacy, Charge nurse / hot rounds and Patient  The patient remains critically ill.  Critical care time = 83 minutes in directly providing and coordinating critical care and extensive data review.  No time overlap and excludes procedures.

## 2020-01-23 NOTE — CARE PLAN
Problem: Nutritional:  Goal: Achieve adequate nutritional intake  Description  Patient will consume 50% of meals   Outcome: PROGRESSING SLOWER THAN EXPECTED   PO 0-<25% of two meals thus far.

## 2020-01-23 NOTE — THERAPY
Occupational Therapy Contact Note  OT consult rec'd. Currently in dialysis for the next 4 hours per RN, will re-attempt as appropriate/able.   Shahida JOVEL, OTR/L    Pager #[phone]

## 2020-01-23 NOTE — DIETARY
Nutrition Services: Brief Update    Poor PO noted per ADL flow sheet (PO 0-<25% for most recent two meals); poor intake discussed by RN in rounds.  Spoke with pt and  at bedside.  Pt appeared adequately nourished.   reports pt has had a low appetite during admit.   Discussed oral nutrition supplements with pt and .  Pt consumes Glucerna at home.  Pt agreeable to Boost Glucose Control in vanilla, Chobani yogurt smoothies and/or high protein milkshakes in vanilla.    Recommendations/Plan:  1. Advance diet as tolerated per MD/SLP.  2. Encourage intake of meals, snacks and/or supplements.  3. Document intake of all meals, snacks and/or supplements as % taken in ADLs to provide interdisciplinary communication across all shifts.   4. Monitor weight.  5. Nutrition rep will continue to see patient for ongoing meal and snack preferences.     RD will continue to follow.

## 2020-01-23 NOTE — DOCUMENTATION QUERY
[facility]                                                                       Query Response Note      PATIENT:               TATIANA BRIDGES  ACCT #:                  [id number]  MRN:                     [id number]  :                      1965  ADMIT DATE:       2020 12:29 PM  DISCH DATE:          RESPONDING  PROVIDER #:        [id number]           QUERY TEXT:    Abnormal UA and positive urine culture is documented in the Medical Record. Please further clarify this condition as:    NOTE:  If an appropriate response is not listed below, please respond with a new note.    The patient's Clinical Indicators include:   UA: Positive - Occult blood, leukocyte esterase, bacteria   Urine Culture: Positive - Klebsiella oxytoca   Renal US: No hydronephrosis or renal abnormality.  Treatment: IVF; antibiotics; lab testing  Risk Factors: Female; alcohol abuse; incontinent of stool;  GI bleed; shock; GENESIS  Options provided:   -- Urinary tract infection (UTI)   -- Cystitis   -- Findings of no clinical significance   -- Unable to determine      Query created by: Marilyn Fields on 2020 7:53 AM    RESPONSE TEXT:    Asymptomatic bacteriuria          Electronically signed by:  JAEL ARREOLA DO 2020 3:04 PM

## 2020-01-23 NOTE — PROGRESS NOTES
Patient's SpO2 decreased to low 70s. Oxymask and non-rebreather used to increase SpO2 to >90. MD notified. Orders received for ABG and chest x-ray.

## 2020-01-23 NOTE — THERAPY
PT eval order received and attempted. RN requesting hold as pt is in dialysis and having increased respiratory needs. Will eval as able and as appropriate.

## 2020-01-24 PROBLEM — J96.01 ACUTE RESPIRATORY FAILURE WITH HYPOXIA (HCC): Status: ACTIVE | Noted: 2020-01-01

## 2020-01-24 NOTE — CARE PLAN
Problem: Respiratory:  Goal: Respiratory status will improve  Outcome: NOT MET  Note:   Pts WOB increasing, now on high flow nasal cannula, signs of aspiration when drinking water. Changed to NPO for SLP eval.     Problem: Urinary Elimination:  Goal: Ability to reestablish a normal urinary elimination pattern will improve  Outcome: NOT MET  Flowsheets (Taken 1/24/2020 0630)  Urinary Elimination: Anuric  Note:   Pt continues to be anuric, receiving dialysis this am, ortiz still in place, crt decreased.

## 2020-01-24 NOTE — THERAPY
"Speech Language Therapy dysphagia treatment completed.   Functional Status:  Patient seen this date for dysphagia tx session, per RN request, as patient was made NPO per nursing judgment last night. Patient now on 40L HFNC at 80% FiO2. Patient appeared much more weak and lethargic than previous session with this SLP. Patient noted to have wet/gurgly vocal quality prior to any PO trials that were difficult for patient to clear, which required oral suctioning via [place]. Patient consumed PO trials of single ice chips and NTL via tsp and cup sip. Patient presented with wet/gurgly vocal quality on PO trials of both consistencies, which is concerning for penetration/aspiration. Patient also weak and fatigued and would likely not meet nutritional needs via PO intake alone. Patient also unable to cough/throat clear to improve vocal quality. At this time, recommend patient continue strict NPO with placement of Cortrak for supplemental nutrition. RN aware. SLP is following.     Recommendations: At this time, recommend patient continue strict NPO with placement of Cortrak for supplemental nutrition.  Plan of Care: Will benefit from Speech Therapy 3 times per week  Post-Acute Therapy: Recommend inpatient transitional care services for continued speech therapy services.      See \"Rehab Therapy-Acute\" Patient Summary Report for complete documentation.     "

## 2020-01-24 NOTE — FLOWSHEET NOTE
01/24/20 1024   Events/Summary/Plan   Events/Summary/Plan Patient completed physical therapy resting comfortably with family member at bedside   Education   Education Yes - Pt. / Family has been Instructed in use of Respiratory Equipment   Heated Hi Flow Nasal Cannula   Heated Hi Flow Nasal Cannula (HHFNC) Yes   FiO2 (HHFNC) 80   Flowrate (HHFNC) 40   Humidifier Temperature (HHFNC) 31   Respiratory WDL   Respiratory (WDL) X   Chest Exam   Work Of Breathing / Effort Moderate;Increased Work of Breathing   Respiration 16   Pulse 91   Breath Sounds   Pre/Post Intervention Pre Intervention Assessment   RUL Breath Sounds Clear   RML Breath Sounds Diminished   RLL Breath Sounds Fine Crackles;Diminished   NILDA Breath Sounds Clear   LLL Breath Sounds Fine Crackles;Diminished   Oximetry   Continuous Oximetry Yes   Oxygen   Pulse Oximetry 98 %   O2 (LPM) 40   FiO2% 80 %   O2 Daily Delivery Respiratory  Highflow Nasal Cannula

## 2020-01-24 NOTE — PROGRESS NOTES
Nephrology Daily Progress Note    Date of Service  1/24/2020     HISTORY OF PRESENT ILLNESS:    55yo female BIB EMS to ED for altered mentation. Patient is unable to provide history 2/2 AMS. History obtained from her significant other (at bedside) and ED staff. Patient stopped consuming EtOH ~2 weeks ago after history of excessive use. She did not taper or utilize BZDs. She has had hematemesis x 1-2 months and dark tarry stools for at least 2 weeks. She also stopped smoking and significant other repeatedly denies use of any illicit substance, new medications, or supplements except protein shakes. She has been taking ~2 ibuprofen tabs daily for 2 weeks. EMS reported patient minimally responsive and moaning upon their arrival.      Patient is hypotensive in ED, SBP 60s-100s, responsive to IVF. Hgb 4 at presentation, provided with IVF. She has been placed on PPI. She is acidotic and has significant hyponatremia and hyperkalemia. She had been provided with NS prior to consultation.      No known h/o renal disease or DM. Possible HTN associated with EtOH use per significant other. SCr 0.82 7/2011     UDS negative, ASA pending    Interval Problem Update  01/20 - consult done, HD provided  01/21 - tolerated HD, Na, K, CO2, BUN improved, poor UOP, alert this AM, she confirms no ingestions, cessation of EtOH use after excessive use, use of ~400mg ibuprofen daily x 2 weeks, as well as hematemesis and melena PTA  01/22 - SCr increased, Na near normal, K wnl, acidosis improved, possible US vs MRI today, had EGD which demonstrated ulcerative esophagitis, small hiatal hernia, portal HTN gastropathy  01/23 - no UOP, +edema  01/24 - high flow NC, 2L off with HD today, 8L positive for hospitalization    Review of Systems  Review of Systems   Constitutional: Negative for chills and fever.   Respiratory: Positive for shortness of breath. Negative for cough.    Cardiovascular: Positive for leg swelling. Negative for chest pain.    Gastrointestinal: Negative for abdominal pain, diarrhea, nausea and vomiting.   Genitourinary: Negative for dysuria, frequency, hematuria and urgency.   Neurological: Positive for weakness. Negative for focal weakness and seizures.        Physical Exam  Temp:  [36.1 °C (97 °F)-36.2 °C (97.2 °F)] 36.1 °C (97 °F)  Pulse:  [] 100  Resp:  [10-44] 19  BP: ()/(53-72) 92/64  SpO2:  [75 %-99 %] 95 %    Physical Exam  Constitutional:       General: She is not in acute distress.     Appearance: She is ill-appearing.      Comments: Thin   HENT:      Head: Normocephalic and atraumatic.      Nose: Nose normal.      Mouth/Throat:      Mouth: Mucous membranes are dry.   Eyes:      General: Scleral icterus present.         Right eye: No discharge.         Left eye: No discharge.      Extraocular Movements: Extraocular movements intact.   Cardiovascular:      Rate and Rhythm: Normal rate and regular rhythm.      Heart sounds: No murmur.   Pulmonary:      Breath sounds: Rales present. No wheezing.      Comments: High flow NC  Abdominal:      General: Abdomen is flat. There is no distension.      Palpations: Abdomen is soft.   Musculoskeletal:         General: Swelling (hips) present. No tenderness.   Skin:     General: Skin is warm and dry.   Neurological:      General: No focal deficit present.      Mental Status: She is alert and oriented to person, place, and time.   Psychiatric:         Mood and Affect: Mood normal.         Fluids    Intake/Output Summary (Last 24 hours) at 1/24/2020 1009  Last data filed at 1/24/2020 1000  Gross per 24 hour   Intake 1470.3 ml   Output 3500 ml   Net -2029.7 ml       Laboratory  Recent Labs     01/22/20  0350 01/23/20  0530 01/24/20  0400   WBC  --  27.0* 23.0*   RBC  --  2.60* 2.69*   HEMOGLOBIN 8.2* 8.5* 8.7*   HEMATOCRIT  --  25.9* 28.1*   MCV  --  99.6* 104.5*   MCH  --  32.7 32.3   MCHC  --  32.8* 31.0*   RDW  --  81.1* 93.0*   PLATELETCT  --  127* 115*   MPV  --  11.9 12.5      Recent Labs     01/22/20  0350 01/23/20  0530 01/24/20  0400   SODIUM 132* 132* 136   POTASSIUM 4.3 4.0 3.7   CHLORIDE 87* 90* 95*   CO2 24 21 24   GLUCOSE 171* 140* 136*   BUN 89* 100* 52*   CREATININE 6.38* 6.45* 4.35*   CALCIUM 6.8* 6.4* 7.2*         No results for input(s): NTPROBNP in the last 72 hours.            Imaging     Renal US per radiology read (reviewed):   IMPRESSION:     1.  No hydronephrosis or renal abnormality.  2.  Bladder is decompressed by Velez catheter.    Assessment/Plan     GENESIS: Etiology likely prolonged prerenal state, may be small contributing factor of NSAIDs use. UA reviewed, concerning for possible infection, no casts (except 0-2 hyaline) or crystals noted. Will repeat if able. Renal US without abnormality. PCR and ACR pending. Will check ANCA, SPEP/FLC, VARGHESE for completeness, but mostly likely ATN. Complements wnl. Plan renal biopsy for Monday, reviewed with patient and significant other. Concentrate IV meds as able. On pressor support. Preferable to keep MAP>60, SBP>110. Continue midodrine 10mg TID. Daily labs and weights, renally dose meds, strict I/O, no NSAIDs, no nephrotoxins. ASA results not found, UDS negative. Hep panel reviewed.      Hyperkalemia: resolved, monitor     Acidosis: improved     Septic and hemorrhagic shock: per ICU     upper GIB: Hgb improved, on PPI     Hyponatremia: resolved    Hyperphosphatemia: improved, no binders at this time    Hypocalcemia: improving, 3Ca bath with HD, vitamin D low and PTH elevated, start ergocalciferol    Edema: concentrate IVF, UF with HD    Resp failure: high flow NC per ICU, minimize fluids, UF with HD again tomrrow     Probable chronic liver disease and associated coagulopathy: note INR elevated, albumin low, ammonia elevated. Platelets wnl. AST elevated, ALT wnl. No documented h/o cirrhosis      Long discussion with patient and her significant other regarding plan           Other management per primary service        All prior  notes form other physicians and RN staff were reviewed from admission to current day to help me make my clinical decisions    PMH/PSH/FH/SH reviewed and unchanged  Medications and labs reviewed

## 2020-01-24 NOTE — PROGRESS NOTES
HD treatment today per routine order.Treatment tolerated well.BP supported with 2 pressors.Net UF removed 2000 ml.SOB with improvement.CVC locked with heparin.Report given to primary Rn.

## 2020-01-24 NOTE — PROGRESS NOTES
Gastroenterology Progress Note     Author: HECTOR Emery   Date & Time Created: 1/24/2020 10:30 AM    Chief Complaint:   Hematemesis, Hematochezia, Alcoholism.    Interval History:  Patient participating in PT/OT at time of exam.  Required high flow nasal cannula overnight.  Primary team and nephrology suspect fluid a contributing cause, planning to limit fluids and dialyze again.     Review of Systems:  Review of Systems   Constitutional: Positive for malaise/fatigue.   Respiratory: Positive for shortness of breath.    Gastrointestinal: Positive for abdominal pain. Negative for blood in stool, constipation, diarrhea, heartburn, melena, nausea and vomiting.   Neurological: Positive for weakness.       Physical Exam:  Physical Exam  Constitutional:       Appearance: She is ill-appearing.   HENT:      Head: Normocephalic.   Eyes:      Pupils: Pupils are equal, round, and reactive to light.   Cardiovascular:      Rate and Rhythm: Normal rate.      Pulses: Normal pulses.      Heart sounds: Normal heart sounds.   Pulmonary:      Comments: Decreased right sided lung sounds  Abdominal:      General: Abdomen is flat. Bowel sounds are normal. There is no distension.      Palpations: Abdomen is soft.      Tenderness: There is tenderness.   Skin:     General: Skin is warm and dry.      Coloration: Skin is jaundiced.   Neurological:      General: No focal deficit present.      Mental Status: She is alert and oriented to person, place, and time.   Psychiatric:         Mood and Affect: Mood normal.         Behavior: Behavior normal.      Comments: Slow speech, distractable         Labs:  Recent Labs     01/23/20  1442   ISTATAPH 7.446   ISTATAPCO2 41.6*   ISTATAPO2 63*   ISTATATCO2 30   [id number] 92*   ISTATARTHCO3 28.7*   ISTATARTBE 4*   ISTATTEMP 96.1 F   ISTATFIO2 15   ISTATSPEC Arterial   ISTATAPHTC 7.467   [id number] 57*         Recent Labs     01/22/20  0350 01/23/20  0530 01/24/20  0400   SODIUM  132* 132* 136   POTASSIUM 4.3 4.0 3.7   CHLORIDE 87* 90* 95*   CO2    BUN 89* 100* 52*   CREATININE 6.38* 6.45* 4.35*   MAGNESIUM  --  2.5  --    PHOSPHORUS  --  7.9*  --    CALCIUM 6.8* 6.4* 7.2*     Recent Labs     20  1500 20  0400   ALTSGPT  --   33   ASTSGOT  --  162* 139* 145*   ALKPHOSPHAT  --  223* 195* 200*   TBILIRUBIN  --  9.5* 10.4* 11.9*   LIPASE 374*  --   --   --    GLUCOSE  --  171* 140* 136*     Recent Labs     20  040   RBC  --  2.60* 2.69*   HEMOGLOBIN 8.2* 8.5* 8.7*   HEMATOCRIT  --  25.9* 28.1*   PLATELETCT  --  127* 115*     Recent Labs     20  03520  0400   WBC  --  27.0* 23.0*   NEUTSPOLYS  --  96.60* 94.00*   LYMPHOCYTES  --  3.40* 1.20*   MONOCYTES  --  0.00 3.00   EOSINOPHILS  --  0.00 0.00   BASOPHILS  --  0.00 0.80   ASTSGOT 162* 139* 145*   ALTSGPT  33   ALKPHOSPHAT 223* 195* 200*   TBILIRUBIN 9.5* 10.4* 11.9*     Hemodynamics:  Temp (24hrs), Av.2 °C (97.1 °F), Min:36.1 °C (97 °F), Max:36.2 °C (97.2 °F)  Temperature: 36.1 °C (97 °F), Monitored Temp: 36 °C (96.8 °F)  Pulse  Av.1  Min: 59  Max: 117   Blood Pressure: (!) 92/64, Arterial BP: 116/78     Respiratory:    Respiration: 16, Pulse Oximetry: 98 %, O2 Daily Delivery Respiratory : Highflow Nasal Cannula     Work Of Breathing / Effort: Moderate;Increased Work of Breathing  RUL Breath Sounds: Clear, RML Breath Sounds: Diminished, RLL Breath Sounds: Fine Crackles;Diminished, NILDA Breath Sounds: Clear, LLL Breath Sounds: Fine Crackles;Diminished  Fluids:    Intake/Output Summary (Last 24 hours) at 2020 0823  Last data filed at 2020 0635  Gross per 24 hour   Intake 1446.8 ml   Output 0 ml   Net 1446.8 ml        GI/Nutrition:  Orders Placed This Encounter   Procedures   • Diet NPO     Standing Status:   Standing     Number of Occurrences:   1     Order Specific Question:   Restrict to:     Answer:   " Strict [1]     Medical Decision Making, by Problem:  Active Hospital Problems    Diagnosis   • *Septic shock (HCC) [A41.9, R65.21]   • Hyperkalemia [E87.5]   • GENESIS (acute kidney injury) (HCC) [N17.9]   • Upper GI bleed [K92.2]   • Hemorrhagic shock (HCC) [R57.8]   • Seizure (HCC) [R56.9]   • Alcohol abuse [F10.10]   • Metabolic acidosis [E87.2]   • Lactic acidosis [E87.2]   • Coagulopathy (HCC) [D68.9]   • Elevated troponin [R79.89]   • Increased ammonia level [R79.89]       Plan:  EGD 1/21 revealed LA grade D esophagitis with erosive esophageal ulcers, a small hiatal hernia, mild portal hypertensive gastropathy with some clots and blood throughout the stomach, but no varices.  Hgb stable, no further signs of bleeding.       1.  Protonix 40 mg b.i.d.  Continue PO PPI BID outpatient at discharge x 8 weeks.    2.  Follow up in the outpatient setting for repeat EGD in about 2 months.  3.  Follow up in the outpatient setting with [facility] for further evaluation of her liver disease, advanced liver disease versus alcoholic hepatitis.  Elevated transaminases now likely related to alcoholic hepatitis vs liver shock.   4.  Ultrasound of the abdomen revealed \"Ill-defined hypodense hepatic lesions identified on recent CT are not visualized on ultrasound presumably secondary to markedly increased hepatic echogenicity secondary to hepatic steatosis and/or diffuse hepatocellular disease. Hepatomegaly.  Mild dilation of the main portal vein with hepatopedal flow.\"   MRI did not note any lesions, did again suggest fatty infiltration.   5.  Continue to monitor liver function test.  Transaminases labile, bilirubin continues to worsen.  In the setting of alcoholic hepatitis, bili may rise further in next weeks.  Continue lactulose for encephalopathy and can consider Xifaxan 550 mg twice daily if encephalopathy worsens.       Quality-Core Measures  "

## 2020-01-24 NOTE — THERAPY
"Physical Therapy Evaluation completed.   Bed Mobility: Moderate Assist     Transfers: Deferred    Gait: Unable to Participate  with No Equipment Needed       Plan of Care: Will benefit from Physical Therapy 3 times per week  Discharge Recommendations: Equipment: Will Continue to Assess for Equipment Needs. Post-acute therapy Discharge to a transitional care facility for continued skilled therapy services.    Pt admitted for confusion workup and presents with gross, generalized weakness and poor activity tolerance. She required mod A to exit bed, and once upright in sitting, min A to remain so. Formally, pt demonstrates 3+/5 strength to B LE and could likely stand, however, pt's eyes inconsistently rolling back into her head and was returned to supine. BP prior: 125/68mmHg. While here, PT will follow to address weakness, instability, and poor activity tolerance. Recommend placement.     See \"Rehab Therapy-Acute\" Patient Summary Report for complete documentation.     "

## 2020-01-24 NOTE — PROGRESS NOTES
Pt coughing on sips of water overnight with a very weak cough. WOB increasing and desaturates for longer periods of time. When she takes her oxygen mask off she desaturates quickly to 60%. This RN is making pt NPO per nursing judgment. Will ask SLP to reassess. Pt started on high flow nasal cannula. Could not keep sats above 88% on 15L oxymask.

## 2020-01-24 NOTE — THERAPY
"Occupational Therapy Evaluation completed.   Functional Status: Pt is a 55yo female admitted for confusion, weakness, and hematemesis; found to have Upper GI bleed and sepsis. PMHx of ETOH abuse, pancreatitis, GI bleed. BP initially 125/68. Sup>sit with max A. Seated EOB for 5 min; req intermittent mod-min A for sitting balance. Max A for LB dressing. Began to fatigue with eyes intermittently rolling back in head, and becoming less responsive to questions. Returned to supine. Demo'd decreased BUE strength and poor insight into deficits. Currently limited by decreased functional mobility, activity tolerance, cognition, strength, balance, and pain which are currently affecting pt's ability to complete ADLs/IADLs at baseline. Currently recommend acute OT, and post-acute placement for additional occupational therapy services prior to discharge home.    Plan of Care: Will benefit from Occupational Therapy 3 times per week  Discharge Recommendations:  Equipment: Will Continue to Assess for Equipment Needs. Post-acute therapy: Recommend post-acute placement for additional occupational therapy services prior to discharge home.    See \"Rehab Therapy-Acute\" Patient Summary Report for complete documentation.      "

## 2020-01-24 NOTE — PROGRESS NOTES
"Critical Care Progress Note    Date of admission  1/20/2020    Chief Complaint  AMS, hematemesis    Hospital Course    54 y.o. female with hx of alcohol abuse, hx of recent pancreatitis, upper GI bleed 2 months ago, who presented 1/20/2020 with altered mental status and abd pain. Pt was actively drinking until 2 weeks ago where she quit \"cold turkey\" per sundeep. History obtained from  and medical record. Since then, pt has been having N/V and in the past few days, pt developed progressive weakness and AMS. Sundeep reported small amount of blood in her emesis as well as in her stools. Pt was brought to ED. At ED, pt was hypotensive with BP in 60/40s. Also reported ED noted melanotic stools on the sheets and some blood around her mouth. Pt received 3L crystalloid fluid boluses. Lactate 13, creatinine 13, K 6.9. HCO3 7. Ceftriaxone started. Blood cultures obtained. INR 2.0, plt 215K     HyperK cocktail was given. Nephrology was consulted for emergent dialysis. Hgb 4.0, received 2U uncrossmatched PRBCs with 1U plat and 1U FFP. Per ED, pt's mental status is somewhat improved with fluids, but pt still moaning while I got there. Pt c/o epigastric pain. On 3L NC with sat >90%. SBP in 100s after fluid boluses.    1/20 s/p emergent dialysis  1/22 s/p EGD: ulcerative esophagitis, mild portal HTN gastropathy, small hiatus hernia.       Interval Problem Update  Reviewed last 24 hour events:  Remains critically ill  No acute events overnight.   Remains on pressors, NE down to 7, vasopressin  Pt had an episode of hypoxia overnight.  Pt required high flow oxygen device  Had dialysis this am, 2L fluid out  Afebrile Tm 36.2C  MAP >60, HR in 100s  Hgb stable in 7- 8s. No further PRBC transfusions required.   K is 4.3 this am.   Lactic acid continues to improve 1.7  WBC 23K  No urine output    Review of Systems  Review of Systems   Reason unable to perform ROS: limited due to mental status.        Vital Signs for last 24 hours "   Temp:  [36.1 °C (97 °F)-36.2 °C (97.2 °F)] 36.1 °C (97 °F)  Pulse:  [] 98  Resp:  [10-44] 20  BP: ()/(53-72) 97/66  SpO2:  [75 %-99 %] 95 %    Hemodynamic parameters for last 24 hours       Respiratory Information for the last 24 hours       Physical Exam   Physical Exam  Vitals signs and nursing note reviewed.   Constitutional:       General: She is not in acute distress.     Appearance: She is ill-appearing and toxic-appearing. She is not diaphoretic.      Comments:  at bedside   HENT:      Head: Normocephalic and atraumatic.      Mouth/Throat:      Mouth: Mucous membranes are dry.   Eyes:      General: Scleral icterus present.      Pupils: Pupils are equal, round, and reactive to light.   Neck:      Musculoskeletal: Neck supple.   Cardiovascular:      Rate and Rhythm: Normal rate and regular rhythm.      Pulses: Normal pulses.      Heart sounds: Normal heart sounds. No murmur.   Pulmonary:      Effort: No respiratory distress.      Breath sounds: Normal breath sounds. No wheezing, rhonchi or rales.   Abdominal:      General: There is no distension.      Palpations: Abdomen is soft.      Tenderness: There is no tenderness. There is no guarding.   Musculoskeletal:      Right lower leg: No edema.      Left lower leg: No edema.   Skin:     Coloration: Skin is not jaundiced.      Findings: No bruising or rash.   Neurological:      General: No focal deficit present.      Mental Status: She is alert and oriented to person, place, and time.         Medications  Current Facility-Administered Medications   Medication Dose Route Frequency Provider Last Rate Last Dose   • midodrine (PROAMATINE) tablet 10 mg  10 mg Oral Q8HRS Siri Day D.O.   Stopped at 01/24/20 0600   • midodrine (PROAMATINE) tablet 5 mg  5 mg Oral Once Siri Day D.O.       • lactulose 20 GM/30ML solution 30 mL  30 mL Oral BID Keith Montenegro D.O.   Stopped at 01/23/20 1800   • [START ON 1/26/2020] prednisoLONE (PRELONE) 15  MG/5ML syrup 39.9 mg  39.9 mg Oral DAILY Keith Montenegro D.O.       • thiamine (B-1) 200 mg in D5W 100 mL IVPB  200 mg Intravenous BID Keith Montenegro D.O. 200 mL/hr at 01/24/20 0530 200 mg at 01/24/20 0530   • LORazepam (ATIVAN) injection 2-4 mg  2-4 mg Intravenous Q HOUR PRN OPAL Gonzales.O.       • insulin regular (HUMULIN R) injection 1-6 Units  1-6 Units Subcutaneous Q6HRS Keith Montenegro D.O.   Stopped at 01/22/20 1800    And   • glucose 4 g chewable tablet 16 g  16 g Oral Q15 MIN PRN OPAL Gonzales.O.        And   • DEXTROSE 10% BOLUS 250 mL  250 mL Intravenous Q15 MIN PRN OPAL Gonzales.O.       • pantoprazole (PROTONIX) injection 40 mg  40 mg Intravenous BID OPAL Gonzales.O.   40 mg at 01/24/20 0530   • mupirocin (BACTROBAN) 2 % ointment   Topical BID Keith Montenegro D.O.   1 Application at 01/24/20 0531   • morphine (pf) 4 MG/ML injection 2 mg  2 mg Intravenous Q HOUR PRN Toy Martinez M.D.   2 mg at 01/22/20 0815   • norepinephrine (LEVOPHED) 8 mg in  mL Infusion  0-30 mcg/min Intravenous Continuous OPAL Gonzales.O. 28.1 mL/hr at 01/24/20 0530 15 mcg/min at 01/24/20 0530   • Pharmacy Consult Request - to monitor for nephrotoxic agents  1 Each Other PHARMACY TO DOSE Anthony Pardo D.O.       • Respiratory Care per Protocol   Nebulization Continuous RT Anthony Pardo D.O.       • lactated ringers infusion (BOLUS): BMI less than or equal to 30  30 mL/kg Intravenous Once PRN Anthony Pardo D.O.       • thiamine tablet 100 mg  100 mg Oral DAILY Anthony Pardo D.O.   Stopped at 01/24/20 0600    And   • folic acid (FOLVITE) tablet 1 mg  1 mg Oral DAILY Anthony Pardo D.O.   Stopped at 01/24/20 0600    And   • multivitamin (THERAGRAN) tablet 1 Tab  1 Tab Oral DAILY Anthony Pardo D.O.   Stopped at 01/24/20 0600   • piperacillin-tazobactam (ZOSYN) 4.5 g in  mL IVPB  4.5 g Intravenous Q12HRS Keith Montenegro D.O.   Stopped at 01/24/20 0354   • vasopressin (VASOSTRICT) 20 Units in  mL Infusion  0.03 Units/min  Intravenous Continuous Keith Montenegro D.O. 9 mL/hr at 01/24/20 0006 0.03 Units/min at 01/24/20 0006   • hydrocortisone sodium succinate PF (SOLU-CORTEF) 100 MG injection 50 mg  50 mg Intravenous Q6HRS REILLY GonzalesOTucker   50 mg at 01/24/20 0531   • heparin injection 2,400 Units  2,400 Units Intracatheter DIALYSIS PRN Siri Day D.O.   2,400 Units at 01/23/20 1547       Fluids    Intake/Output Summary (Last 24 hours) at 1/24/2020 0651  Last data filed at 1/24/2020 0600  Gross per 24 hour   Intake 1437.87 ml   Output 1500 ml   Net -62.13 ml       Laboratory  Recent Labs     01/23/20  1442   ISTATAPH 7.446   ISTATAPCO2 41.6*   ISTATAPO2 63*   ISTATATCO2 30   XKNLAEU1SPA 92*   ISTATARTHCO3 28.7*   ISTATARTBE 4*   ISTATTEMP 96.1 F   ISTATFIO2 15   ISTATSPEC Arterial   ISTATAPHTC 7.467   CIBHQUFF6LE 57*         Recent Labs     01/22/20  0350 01/23/20  0530 01/24/20  0400   SODIUM 132* 132* 136   POTASSIUM 4.3 4.0 3.7   CHLORIDE 87* 90* 95*   CO2 24 21 24   BUN 89* 100* 52*   CREATININE 6.38* 6.45* 4.35*   MAGNESIUM  --  2.5  --    PHOSPHORUS  --  7.9*  --    CALCIUM 6.8* 6.4* 7.2*     Recent Labs     01/21/20  0840 01/21/20  1500 01/22/20  0350 01/23/20  0530 01/24/20  0400   ALTSGPT  --   --  28 30 33   ASTSGOT  --   --  162* 139* 145*   ALKPHOSPHAT  --   --  223* 195* 200*   TBILIRUBIN  --   --  9.5* 10.4* 11.9*   LIPASE 360* 374*  --   --   --    GLUCOSE 254*  --  171* 140* 136*     Recent Labs     01/22/20  0350 01/23/20  0530 01/24/20  0400   WBC  --  27.0* 23.0*   NEUTSPOLYS  --  96.60* 94.00*   LYMPHOCYTES  --  3.40* 1.20*   MONOCYTES  --  0.00 3.00   EOSINOPHILS  --  0.00 0.00   BASOPHILS  --  0.00 0.80   ASTSGOT 162* 139* 145*   ALTSGPT 28 30 33   ALKPHOSPHAT 223* 195* 200*   TBILIRUBIN 9.5* 10.4* 11.9*     Recent Labs     01/22/20  0350 01/23/20  0530 01/24/20  0400   RBC  --  2.60* 2.69*   HEMOGLOBIN 8.2* 8.5* 8.7*   HEMATOCRIT  --  25.9* 28.1*   PLATELETCT  --  127* 115*       Imaging  X-Ray:  I have  personally reviewed the images and compared with prior images.    Assessment/Plan  * Septic shock (HCC)  Assessment & Plan  This is Septic shock Present on admission  SIRS criteria identified on my evaluation include: Hypothermia, with temperature less than 96.8 deg F, Tachycardia, with heart rate greater than 90 BPM, Tachypnea, with respirations greater than 20 per minute and Leukocyosis, with WBC greater than 12,000  Source is Upper GI bleed, GI  Presentation includes: Severe sepsis present and initial Lactate level result is >= 4 mmol/L.   Despite appropriate fluid resuscitation with crystalloid given per sepsis guidelines, the patient remains hypotensive with systolic blood pressure less than 90 or MAP less than 65  Hemodynamic support with additional fluids and IV vasopressors as needed to maintain a SBP of 90 or MAP of 65  IV antibiotics as appropriate for source of sepsis  Reassessment: I have reassessed the patient's hemodynamic status    Continue norepinephrine gtt and vasopressin to actively titrate to goal MAP >60  Serial lactates until lactate <2. I expect decrease lactate clearance due to her liver dysfunction .   Continue piptazo.   Nasal MRSA screen positive, decolonize with mupirocin BID x 5 days.   Follow up blood cultures  Continue hydrocortisone 50 Q6H x 5days  Thiamine 200 IV Q12H x 5 days  CT A/P to rule out any intraabdominal source. Noted hypodense lesions in the liver. Will need contrast to better characterize the lesions  US RUQ did not show any lesions.   Pt had MRI w/o contrast with diffusion and also did not show any evidence of hepatic lesions. Liver lesions noted on CT is likely from fatty infiltrtation   Increase midodrine 15 TID    Hemorrhagic shock (HCC)  Assessment & Plan  Due to Upper GI bleed, in the setting of active alcohol abuse.   S/p 4U PRBCs, 1U FFP on 1/20  Bleeding seems to have stopped.   Hgb has been stable in 48 hours with Hgb in 8s. CBC daily  Transfuse to keep Hgb  >7  No furhter bleeding noted      Upper GI bleed  Assessment & Plan  Evidence of hematemesis and melena at admission  Pt's Hgb 4.0 at admission   S/p 4U PRBCs and 2U FFPs  EGD today by GI and noted ulcerative esophagitis. No esophageal varices  H/H Q6H transfuse to keep Hgb >7  Continue protonix 40 BID  Ionized calcium is still low and will replete calcium chloride.   Advance diet as tolerated  Repeat iron studies in 3 days.     GENESIS (acute kidney injury) (HCC)  Assessment & Plan  Likely due to ATN, now with life threatening hyperkalemia  Bedside US without evidence of hydronephrosis  Received 3L fluid boluses.   S/p emergent dialysis 1/20    Hyperkalemia  Assessment & Plan  Resolved.   Life threatening hyperkalemia 6.9 at admission  S/p hyperK cocktail at ED. No ECG changes in ED  Neph consulted  Emergent dialysis  1/20    Seizure (HCC)  Assessment & Plan  One episode of possible seizure on the evening on 1/20. No further episodes noted on 1/21 and 1/22  Pt alert, oriented.   Ativan 2-4 mg IV [id number] prn seizures only     Coagulopathy (HCC)  Assessment & Plan  Liver related  TEG with prolonged R, MA ok  INR 2.0  Plt in 200s, fibrinogen in 500s  Completed vitamin K 10mg IV daily x 3 days 1/20-1/22      Lactic acidosis  Assessment & Plan  High lactate at 13 due to septic shock, hemorrhagic shock, in the setting of alcoholic abuse  Significantly improved, last was 3.5  Continue serial lactates until lactate <2  Resolved.     Metabolic acidosis  Assessment & Plan  Due to lactic acidosis  Resolved. Bicarb gtt was stopped    Alcohol abuse  Assessment & Plan  Per sundeep and patient, quit 2 weeks ago  Banana bag with thiamine, mag, multivitamin completed  Urine drug screen negative   Pt not in withdrawal. CIWA protocol was discontinued 1/21    Acute respiratory failure with hypoxia (HCC)  Assessment & Plan  Pt had few episodes of hypoxia in the past 48 hours, this am requiring high flow to keep sat >90%  Likely due to  pulmonary edema    Alcoholic hepatitis without ascites  Assessment & Plan  LFT suggests alcoholic hepatitis  AST >ALT, Total bili is slowly rising  Maddrey DF score is 39 which he will benefit from steroids.   Currently on hydrocortisone 50 Q6H, will complete for 5 days, then transitioned to prednisolone 40mg daily x total 21 days   Lactulose 30 BID prn to prevent hepatic encephalopathy.          VTE:  Contraindicated  Ulcer: PPI  Lines: Central Line  Ongoing indication addressed, Arterial Line  Ongoing indication addressed and Velez Catheter  Ongoing indication addressed    I have performed a physical exam and reviewed and updated ROS and Plan today (1/24/2020). In review of yesterday's note (1/23/2020), there are no changes except as documented above.     Discussed patient condition and risk of morbidity and/or mortality with Family, RN, RT, Pharmacy, Charge nurse / hot rounds and Patient  The patient remains critically ill.  Critical care time = 40 minutes in directly providing and coordinating critical care and extensive data review.  No time overlap and excludes procedures.

## 2020-01-24 NOTE — PROGRESS NOTES
Speech therapy notified of patient's NPO status based on RN judgment. SLP to come bedside for swallow re-evaluation. Patient's lung fields are crackles/diminished, O2 demands increased to HFNC 40L 80%.

## 2020-01-24 NOTE — FLOWSHEET NOTE
01/24/20 1421   Events/Summary/Plan   Events/Summary/Plan Pt tolerating FiO2 titration, family member at bedside.   Education   Education Yes - Pt. / Family has been Instructed in use of Respiratory Equipment   Heated Hi Flow Nasal Cannula   Heated Hi Flow Nasal Cannula (HHFNC) Yes   FiO2 (HHFNC) 70   Flowrate (HHFNC) 35   Humidifier Temperature (HHFNC) 31.0   Respiratory WDL   Respiratory (WDL) X   Chest Exam   Work Of Breathing / Effort Moderate;Increased Work of Breathing   Respiration (!) 41   Pulse 98   Breath Sounds   Pre/Post Intervention Pre Intervention Assessment   RUL Breath Sounds Crackles   RML Breath Sounds Crackles;Diminished   RLL Breath Sounds Diminished   NILDA Breath Sounds Crackles   LLL Breath Sounds Diminished   Oximetry   Continuous Oximetry Yes   Oxygen   Pulse Oximetry 98 %   O2 (LPM) 35   FiO2% 70 %   O2 Daily Delivery Respiratory  Highflow Nasal Cannula

## 2020-01-25 NOTE — PROCEDURES
Intubation  Date/Time: 1/25/2020 3:32 PM  Performed by: Keith Montenegro D.O.  Authorized by: Keith Montenegro D.O.     Consent:     Consent obtained:  Verbal    Consent given by:  Patient    Risks discussed:  Aspiration, bleeding, death, brain injury, dental trauma, hypoxia, laryngeal injury and pneumothorax    Alternatives discussed:  Delayed treatment and no treatment  Pre-procedure details:     Patient status:  Altered mental status    Pretreatment medications:  Fentanyl    Paralytics:  Succinylcholine  Procedure details:     Preoxygenation:  BiPAP    CPR in progress: no      Intubation method:  Oral    Oral intubation technique:  Direct    Laryngoscope type:  Direct laryngoscopy    Cormack-Lehane Classification:  Grade 1    Tube size (mm):  7.5    Tube type:  Cuffed    Number of attempts:  1    Ventilation between attempts: yes      Cricoid pressure: no      Tube visualized through cords: yes    Placement assessment:     ETT to teeth:  25 cm    Tube secured with:  Adhesive tape and ETT joy    Breath sounds:  Equal    Placement verification: chest rise and CXR verification      CXR findings:  ETT in proper place  Post-procedure details:     Patient tolerance of procedure:  Tolerated well, no immediate complications

## 2020-01-25 NOTE — PROGRESS NOTES
"Critical Care Progress Note    Date of admission  1/20/2020    Chief Complaint  AMS, hematemesis    Hospital Course    54 y.o. female with hx of alcohol abuse, hx of recent pancreatitis, upper GI bleed 2 months ago, who presented 1/20/2020 with altered mental status and abd pain. Pt was actively drinking until 2 weeks ago where she quit \"cold turkey\" per aichachandu. History obtained from  and medical record. Since then, pt has been having N/V and in the past few days, pt developed progressive weakness and AMS. John reported small amount of blood in her emesis as well as in her stools. Pt was brought to ED. At ED, pt was hypotensive with BP in 60/40s. Also reported ED noted melanotic stools on the sheets and some blood around her mouth. Pt received 3L crystalloid fluid boluses. Lactate 13, creatinine 13, K 6.9. HCO3 7. Ceftriaxone started. Blood cultures obtained. INR 2.0, plt 215K     HyperK cocktail was given. Nephrology was consulted for emergent dialysis. Hgb 4.0, received 2U uncrossmatched PRBCs with 1U plat and 1U FFP. Per ED, pt's mental status is somewhat improved with fluids, but pt still moaning while I got there. Pt c/o epigastric pain. On 3L NC with sat >90%. SBP in 100s after fluid boluses.    1/20 s/p emergent dialysis  1/22 s/p EGD: ulcerative esophagitis, mild portal HTN gastropathy, small hiatus hernia.       Interval Problem Update  Reviewed last 24 hour events:  Remains critically ill  Hypoxic requiring high flow at 80%, 60L/min.   We attempted BIPAP 14/10 this afternoon and initially she was tolerating but throughout the day pt slowly became restless, pulling the mask and slightly confused.    Remains on pressors, NE at 14, vasopressin 0.03  Had dialysis this am, 2.5L fluid out.   Afebrile Tm 36.2C  MAP >60, HR in 100s  Hgb stable in 8.6. No further PRBC transfusions required.   K is 3.8 this am.   Lactic acid continues to improve 1.7  WBC 15K  No urine output    Review of Systems  Review " of Systems   Reason unable to perform ROS: limited due to mental status.        Vital Signs for last 24 hours   Pulse:  [] 101  Resp:  [16-65] 47  BP: ()/() 102/69  SpO2:  [87 %-99 %] 99 %    Hemodynamic parameters for last 24 hours       Respiratory Information for the last 24 hours       Physical Exam   Physical Exam  Vitals signs and nursing note reviewed.   Constitutional:       General: She is not in acute distress.     Appearance: She is ill-appearing and toxic-appearing. She is not diaphoretic.      Comments:  at bedside  Alert but appears confused.    HENT:      Head: Normocephalic and atraumatic.      Mouth/Throat:      Mouth: Mucous membranes are dry.   Eyes:      General: Scleral icterus present.      Pupils: Pupils are equal, round, and reactive to light.   Neck:      Musculoskeletal: Neck supple.   Cardiovascular:      Rate and Rhythm: Normal rate and regular rhythm.      Pulses: Normal pulses.      Heart sounds: Normal heart sounds. No murmur.   Pulmonary:      Effort: No respiratory distress.      Breath sounds: Normal breath sounds. No wheezing, rhonchi or rales.      Comments: Diminished at bases  Minimal crackles  Abdominal:      General: There is no distension.      Palpations: Abdomen is soft.      Tenderness: There is no tenderness. There is no guarding.   Musculoskeletal:      Right lower leg: No edema.      Left lower leg: No edema.   Skin:     General: Skin is dry.      Coloration: Skin is jaundiced.      Findings: No bruising, erythema or rash.   Neurological:      General: No focal deficit present.      Mental Status: She is alert and oriented to person, place, and time.         Medications  Current Facility-Administered Medications   Medication Dose Route Frequency Provider Last Rate Last Dose   • vitamin D (Ergocalciferol) (DRISDOL) capsule 50,000 Units  50,000 Units Oral Q7 DAYS Siri Day D.O.       • midodrine (PROAMATINE) tablet 15 mg  15 mg Oral Q8HRS  Keith Montenegro D.O.   Stopped at 01/24/20 1400   • norepinephrine (LEVOPHED) 16 mg in  mL Infusion (NOTE CONCENTRATION)  0-30 mcg/min Intravenous Continuous Keith Montenegro D.O. 12.2 mL/hr at 01/25/20 1000 13 mcg/min at 01/25/20 1000   • lactulose 20 GM/30ML solution 30 mL  30 mL Oral BID Keith Montenegro D.O.   Stopped at 01/23/20 1800   • [START ON 1/26/2020] prednisoLONE (PRELONE) 15 MG/5ML syrup 39.9 mg  39.9 mg Oral DAILY Keith Montenegro D.O.       • thiamine (B-1) 200 mg in D5W 100 mL IVPB  200 mg Intravenous BID Keith Montenegro D.O. 200 mL/hr at 01/25/20 0525 200 mg at 01/25/20 0525   • LORazepam (ATIVAN) injection 2-4 mg  2-4 mg Intravenous Q HOUR PRN Keith Montenegro D.O.       • pantoprazole (PROTONIX) injection 40 mg  40 mg Intravenous BID Keith Montenegro D.O.   40 mg at 01/25/20 0540   • mupirocin (BACTROBAN) 2 % ointment   Topical BID Keith Montenegro D.O.   1 Application at 01/25/20 0526   • morphine (pf) 4 MG/ML injection 2 mg  2 mg Intravenous Q HOUR PRN Toy Martinez M.D.   2 mg at 01/25/20 1000   • Pharmacy Consult Request - to monitor for nephrotoxic agents  1 Each Other PHARMACY TO DOSE Anthony Pardo D.O.       • Respiratory Care per Protocol   Nebulization Continuous RT Anthony Pardo D.O.       • lactated ringers infusion (BOLUS): BMI less than or equal to 30  30 mL/kg Intravenous Once PRN Anthony Pardo D.O.       • piperacillin-tazobactam (ZOSYN) 4.5 g in  mL IVPB  4.5 g Intravenous Q12HRS OPAL Gonzales.O.   Stopped at 01/25/20 0414   • vasopressin (VASOSTRICT) 20 Units in  mL Infusion  0.03 Units/min Intravenous Continuous Keith Montenegro D.O. 9 mL/hr at 01/25/20 0014 0.03 Units/min at 01/25/20 0014   • hydrocortisone sodium succinate PF (SOLU-CORTEF) 100 MG injection 50 mg  50 mg Intravenous Q6HRS OPAL Gonzales.O.   50 mg at 01/25/20 0525   • heparin injection 2,400 Units  2,400 Units Intracatheter DIALYSIS PRN Siri Day D.O.   2,400 Units at 01/24/20 0805       Fluids    Intake/Output Summary  (Last 24 hours) at 1/25/2020 1003  Last data filed at 1/25/2020 0600  Gross per 24 hour   Intake 855.78 ml   Output 0 ml   Net 855.78 ml       Laboratory  Recent Labs     01/23/20  1442   ISTATAPH 7.446   ISTATAPCO2 41.6*   ISTATAPO2 63*   ISTATATCO2 30   [id number] 92*   ISTATARTHCO3 28.7*   ISTATARTBE 4*   ISTATTEMP 96.1 F   ISTATFIO2 15   ISTATSPEC Arterial   ISTATAPHTC 7.467   [id number] 57*         Recent Labs     01/23/20  0530 01/24/20  0400 01/25/20  0537   SODIUM 132* 136 137   POTASSIUM 4.0 3.7 3.8   CHLORIDE 90* 95* 99   CO2 21 24 23   * 52* 30*   CREATININE 6.45* 4.35* 2.48*   MAGNESIUM 2.5  --   --    PHOSPHORUS 7.9*  --  3.7   CALCIUM 6.4* 7.2* 7.8*     Recent Labs     01/23/20 0530 01/24/20  0400 01/25/20  0537   ALTSGPT 30 33 40   ASTSGOT 139* 145* 147*   ALKPHOSPHAT 195* 200* 195*   TBILIRUBIN 10.4* 11.9* 14.4*   GLUCOSE 140* 136* 123*     Recent Labs     01/23/20 0530 01/24/20  0400 01/25/20  0537   WBC 27.0* 23.0* 15.4*   NEUTSPOLYS 96.60* 94.00* 94.80*   LYMPHOCYTES 3.40* 1.20* 3.50*   MONOCYTES 0.00 3.00 1.70   EOSINOPHILS 0.00 0.00 0.00   BASOPHILS 0.00 0.80 0.00   ASTSGOT 139* 145* 147*   ALTSGPT 30 33 40   ALKPHOSPHAT 195* 200* 195*   TBILIRUBIN 10.4* 11.9* 14.4*     Recent Labs     01/23/20 0530 01/24/20  0400 01/25/20  0537   RBC 2.60* 2.69* 2.64*   HEMOGLOBIN 8.5* 8.7* 8.6*   HEMATOCRIT 25.9* 28.1* 27.7*   PLATELETCT 127* 115* 94*       Imaging  X-Ray:  I have personally reviewed the images and compared with prior images.    Assessment/Plan  * Acute respiratory failure with hypoxia (HCC)  Assessment & Plan  Likely due to pulmonary edema, GENESIS, anuric  Worsening resp status today. Pt didn't tolerate BIPAP and worsening hypoxia while on high flow.   Pt is subsequently intubated.   Continue full vent support to keep sat >90%  Sedation with fentanyl and precedex.   Mobility if able. Goal level 2      Alcoholic hepatitis without ascites  Assessment & Plan  LFT suggests alcoholic  hepatitis  AST >ALT, Total bili is slowly rising  Maddrey DF score is 39 which he will benefit from steroids.   Completed 5 day course of hydrocortisone x5 days (1/20-1/25), start prednisolone 40mg daily to complete total 21 days   Lactulose 30 BID prn to prevent hepatic encephalopathy. Start if BM <3/day      Septic shock (HCC)  Assessment & Plan  This is Septic shock Present on admission  SIRS criteria identified on my evaluation include: Hypothermia, with temperature less than 96.8 deg F, Tachycardia, with heart rate greater than 90 BPM, Tachypnea, with respirations greater than 20 per minute and Leukocyosis, with WBC greater than 12,000  Source is Upper GI bleed, GI  Presentation includes: Severe sepsis present and initial Lactate level result is >= 4 mmol/L.   Despite appropriate fluid resuscitation with crystalloid given per sepsis guidelines, the patient remains hypotensive with systolic blood pressure less than 90 or MAP less than 65  Hemodynamic support with additional fluids and IV vasopressors as needed to maintain a SBP of 90 or MAP of 65  IV antibiotics as appropriate for source of sepsis  Reassessment: I have reassessed the patient's hemodynamic status    Unknown source. BCx negative. Imaging not showing any infectious foci.   CT A/P, US and MRI liver did not show any evidence of abscess  Continue norepinephrine gtt and vasopressin to actively titrate to goal MAP >60 or SBP >90  Continue piptazo x 7 days.   Nasal MRSA screen negative  Blood cultures negative.   Completed hydrocortisone 50 Q6H x 5days 1/20-1/25   Completed thiamine 200 IV Q12H x 5 days 1/20-1/25  Continue midodrine 15 TID    GENESIS (acute kidney injury) (HCC)  Assessment & Plan  Likely due to ATN  Life-threatening hyperkalemia at admission s/p emergent dialysis 1/20 - resolved  US kidney without evidence of hydronephrosis  Pt has been anuric since admission, renal function is not recovering  Has been getting dialysis daily. 2.5L off today.      Coagulopathy (HCC)  Assessment & Plan  Liver related  TEG with prolonged R, MA ok  INR 2.0  Plt in 200s, fibrinogen in 500s  Completed vitamin K 10mg IV daily x 3 days 1/20-1/22      Hemorrhagic shock (HCC)  Assessment & Plan  Due to Upper GI bleed, in the setting of active alcohol abuse. - resolved. Bleeding seems to have stopped.   S/p 4U PRBCs, 1U FFP on 1/20  No further blood transfusions  Transfuse to keep Hgb >7      Upper GI bleed  Assessment & Plan  Evidence of hematemesis and melena at admission  Pt's Hgb 4.0 at admission   S/p 4U PRBCs and 2U FFPs  EGD today by GI and noted ulcerative esophagitis. No esophageal varices  H/H Q6H transfuse to keep Hgb >7  Continue protonix 40 BID  Ionized calcium is still low and will replete calcium chloride.   Advance diet as tolerated  Repeat iron studies in 3 days.     Lactic acidosis  Assessment & Plan  High lactate at 13 due to septic shock, hemorrhagic shock, in the setting of alcoholic abuse  Normalized.       Metabolic acidosis  Assessment & Plan  Due to lactic acidosis  Resolved. Bicarb gtt was stopped    Alcohol abuse  Assessment & Plan  Per sundeep and patient, quit 2 weeks ago  Banana bag with thiamine, mag, multivitamin completed at admission   Urine drug screen negative   Pt not in withdrawal. CIWA protocol was discontinued 1/21    Seizure (HCC)  Assessment & Plan  One episode of possible seizure on the evening on 1/20. No further episodes noted on 1/21 until present  Pt alert, oriented.   Ativan 2-4 mg IV [id number] prn seizures only     Hyperkalemia  Assessment & Plan  Resolved. Life threatening hyperkalemia 6.9 at admission. No ECG changes. S/p hyperK cocktail and emergent dialysis 1/20        who's at bedside was updated. All questions were answered.     VTE:  Contraindicated  Ulcer: PPI  Lines: Central Line  Ongoing indication addressed, Arterial Line  Ongoing indication addressed and Velez Catheter  Ongoing indication addressed    I have  performed a physical exam and reviewed and updated ROS and Plan today (1/25/2020). In review of yesterday's note (1/24/2020), there are no changes except as documented above.     Discussed patient condition and risk of morbidity and/or mortality with Family, RN, RT, Pharmacy, Charge nurse / hot rounds and Patient  The patient remains critically ill.  Critical care time = 90 minutes in directly providing and coordinating critical care and extensive data review.  No time overlap and excludes procedures.

## 2020-01-25 NOTE — FLOWSHEET NOTE
01/25/20 0717   Events/Summary/Plan   Events/Summary/Plan PT on HHFNC   Education   Education Yes - Pt. / Family has been Instructed in use of Respiratory Equipment   Heated Hi Flow Nasal Cannula   Heated Hi Flow Nasal Cannula (HHFNC) Yes   FiO2 (HHFNC) 80   Flowrate (HHFNC) 50   Humidifier Temperature (HHFNC) 31   Respiratory WDL   Respiratory (WDL) X   Chest Exam   Work Of Breathing / Effort Moderate;Shallow   Respiration (!) 35   Pulse (!) 109   Breath Sounds   Pre/Post Intervention Pre Intervention Assessment   RUL Breath Sounds Fine Crackles   RML Breath Sounds Fine Crackles   RLL Breath Sounds Diminished   NILDA Breath Sounds Fine Crackles   LLL Breath Sounds Diminished   Oximetry   Continuous Oximetry Yes   Oxygen   Pulse Oximetry 92 %   O2 (LPM) 50   FiO2% 80 %   O2 Daily Delivery Respiratory  Highflow Nasal Cannula

## 2020-01-25 NOTE — RESPIRATORY CARE
Pt placed on bipap 1 hand released from restraints (pt's dominate hand) RN aware and at bedside, pt briefly educated on mask, however seems confused, and lethargic. MD agrees to BiPAP.

## 2020-01-25 NOTE — CARE PLAN
Problem: Bowel/Gastric:  Goal: Normal bowel function is maintained or improved  Outcome: PROGRESSING AS EXPECTED  Goal: Will not experience complications related to bowel motility  Outcome: PROGRESSING AS EXPECTED     Problem: Pain Management  Goal: Pain level will decrease to patient's comfort goal  Outcome: PROGRESSING AS EXPECTED

## 2020-01-25 NOTE — RESPIRATORY CARE
Respiratory Therapy Update                  Cough: Dry;Weak (01/25/20 0400)             Heated Hi Flow Nasal Cannula (HHFNC): Yes (01/25/20 0152)  FiO2 (HHFNC): 70 (01/25/20 0152)  Flowrate (HHFNC): 50 (01/25/20 0152)    FiO2%: 70 % (01/25/20 0152)  O2 (LPM): 50 (01/25/20 0152)  O2 Daily Delivery Respiratory : Highflow Nasal Cannula (01/25/20 0152)    Breath Sounds  Pre/Post Intervention: Pre Intervention Assessment (01/25/20 0400)  RUL Breath Sounds: Fine Crackles (01/25/20 0400)  RML Breath Sounds: Fine Crackles (01/25/20 0400)  RLL Breath Sounds: Diminished (01/25/20 0400)  NILDA Breath Sounds: Fine Crackles (01/25/20 0400)  LLL Breath Sounds: Diminished (01/25/20 0400)    Therapy changed to   Events/Summary/Plan: Pt tolerating FiO2 titration,

## 2020-01-25 NOTE — FLOWSHEET NOTE
01/25/20 1044   Events/Summary/Plan   Events/Summary/Plan Pt resting comfortably   General Vent Information   Pulse Oximetry 99 %   Pulse 93   Resuscitation Bag Resuscitation Bag and Mask at Bedside and Checked   Vent Alarms   Ventilation Alarms Reviewed / Activated Yes   Upper Pressure Limit (HI PIP) Alarm 40   Low VE (LPM) Alarm 2   High Respiratory Rate Alarm 50   Low Respiratory Rate Alarm 6   Low VT Alarm 100   Apnea Parameters Checked / Activated Yes   BiPAP for Respiratory Failure   #BiPap Initial Day    (current total leak 27)   IPAP (cmH2O) 15   EPAP (cm H2O) 10   FiO2 65   Alarms Set / Checked Yes   Non-Invasive Temp (C) 31   Respiratory Rate Patient 20   Spontaneous Vt (ml) 540   Spontaneous Ve (LPM) 11.6   Respiratory WDL   Respiratory (WDL) X   Chest Exam   Work Of Breathing / Effort Moderate;Increased Work of Breathing   Breath Sounds   Pre/Post Intervention Pre Intervention Assessment   RUL Breath Sounds Clear   RML Breath Sounds Clear;Diminished   RLL Breath Sounds Diminished   NILDA Breath Sounds Clear   LLL Breath Sounds Diminished

## 2020-01-25 NOTE — PROGRESS NOTES
RN spoke with Dr. Montenegro regarding need for coretrak tube placement to administer PO medications and nutrition; per Dr. Montenegro, hold off on placing coretrak until further orders due to HHFNC in place and patient's high oxygen demands.

## 2020-01-25 NOTE — CARE PLAN
Pt on FNC 80% and 50L during diaylsis patient was tachpneic, MD requested Bi-Pap/Avaps. Pt repeatedly pulling off mask  at bedside holding hands, and comforting patient. Returned to [facility] and will continue to monitor.

## 2020-01-25 NOTE — FLOWSHEET NOTE
01/25/20 0958   Events/Summary/Plan   Events/Summary/Plan Pt placed on AVAPS   General Vent Information   Pulse Oximetry 99 %   Pulse (!) 101   Resuscitation Bag Resuscitation Bag and Mask at Bedside and Checked   BiPAP for Respiratory Failure   #BiPap Initial Day  Yes   EPAP (cm H2O) 10   FiO2 80   Alarms Set / Checked Yes   Non-Invasive Temp (C) 28.7   Respiratory Rate Patient 27   Spontaneous Vt (ml) 458   Spontaneous Ve (LPM) 11.9

## 2020-01-25 NOTE — PROGRESS NOTES
Nephrology Daily Progress Note    Date of Service  1/24/2020     HISTORY OF PRESENT ILLNESS:    53yo female BIB EMS to ED for altered mentation. Patient is unable to provide history 2/2 AMS. History obtained from her significant other (at bedside) and ED staff. Patient stopped consuming EtOH ~2 weeks ago after history of excessive use. She did not taper or utilize BZDs. She has had hematemesis x 1-2 months and dark tarry stools for at least 2 weeks. She also stopped smoking and significant other repeatedly denies use of any illicit substance, new medications, or supplements except protein shakes. She has been taking ~2 ibuprofen tabs daily for 2 weeks. EMS reported patient minimally responsive and moaning upon their arrival.      Patient is hypotensive in ED, SBP 60s-100s, responsive to IVF. Hgb 4 at presentation, provided with IVF. She has been placed on PPI. She is acidotic and has significant hyponatremia and hyperkalemia. She had been provided with NS prior to consultation.      No known h/o renal disease or DM. Possible HTN associated with EtOH use per significant other. SCr 0.82 7/2011     UDS negative, ASA pending    Interval Problem Update  01/20 - consult done, HD provided  01/21 - tolerated HD, Na, K, CO2, BUN improved, poor UOP, alert this AM, she confirms no ingestions, cessation of EtOH use after excessive use, use of ~400mg ibuprofen daily x 2 weeks, as well as hematemesis and melena PTA  01/22 - SCr increased, Na near normal, K wnl, acidosis improved, possible US vs MRI today, had EGD which demonstrated ulcerative esophagitis, small hiatal hernia, portal HTN gastropathy  01/23 - no UOP, +edema  01/24 - high flow NC, 2L off with HD today, 8L positive for hospitalization  01/25 - high flow NC, HD again today, +9.3L for hospitalization    Review of Systems  Review of Systems   Constitutional: Negative for chills and fever.   Respiratory: Positive for shortness of breath. Negative for cough.     Cardiovascular: Positive for leg swelling. Negative for chest pain.   Gastrointestinal: Negative for abdominal pain, diarrhea, nausea and vomiting.   Genitourinary: Negative for dysuria, frequency, hematuria and urgency.   Neurological: Positive for weakness. Negative for focal weakness and seizures.        Physical Exam  Pulse:  [] 106  Resp:  [16-65] 47  BP: ()/() 102/69  SpO2:  [87 %-99 %] 94 %    Physical Exam  Constitutional:       General: She is not in acute distress.     Appearance: She is ill-appearing.      Comments: Thin   HENT:      Head: Normocephalic and atraumatic.      Nose: Nose normal.      Mouth/Throat:      Pharynx: Oropharynx is clear.   Eyes:      General: Scleral icterus present.         Right eye: No discharge.         Left eye: No discharge.      Extraocular Movements: Extraocular movements intact.   Cardiovascular:      Rate and Rhythm: Normal rate and regular rhythm.      Heart sounds: No murmur.   Pulmonary:      Breath sounds: Rales present. No wheezing.      Comments: High flow NC  Abdominal:      General: Abdomen is flat. There is no distension.      Palpations: Abdomen is soft.   Musculoskeletal:         General: Swelling (hips) present. No tenderness.   Skin:     General: Skin is warm and dry.   Neurological:      General: No focal deficit present.      Mental Status: She is alert and oriented to person, place, and time.   Psychiatric:         Mood and Affect: Mood normal.         Fluids    Intake/Output Summary (Last 24 hours) at 1/25/2020 0914  Last data filed at 1/25/2020 0600  Gross per 24 hour   Intake 929.98 ml   Output 0 ml   Net 929.98 ml       Laboratory  Recent Labs     01/23/20  0530 01/24/20  0400 01/25/20  0537   WBC 27.0* 23.0* 15.4*   RBC 2.60* 2.69* 2.64*   HEMOGLOBIN 8.5* 8.7* 8.6*   HEMATOCRIT 25.9* 28.1* 27.7*   MCV 99.6* 104.5* 104.9*   MCH 32.7 32.3 32.6   MCHC 32.8* 31.0* 31.0*   RDW 81.1* 93.0* 96.9*   PLATELETCT 127* 115* 94*   MPV 11.9  12.5 11.9     Recent Labs     01/23/20  0530 01/24/20  0400 01/25/20  0537   SODIUM 132* 136 137   POTASSIUM 4.0 3.7 3.8   CHLORIDE 90* 95* 99   CO2 21 24 23   GLUCOSE 140* 136* 123*   * 52* 30*   CREATININE 6.45* 4.35* 2.48*   CALCIUM 6.4* 7.2* 7.8*         No results for input(s): NTPROBNP in the last 72 hours.            Imaging     Renal US per radiology read (reviewed):   IMPRESSION:     1.  No hydronephrosis or renal abnormality.  2.  Bladder is decompressed by Velez catheter.    Assessment/Plan     GENESIS: Etiology likely prolonged prerenal state, may be small contributing factor of NSAIDs use. UA reviewed, concerning for possible infection, no casts (except 0-2 hyaline) or crystals noted. Will repeat if able. Renal US without abnormality. PCR and ACR pending. ANCA negative, SPEP/FLC pending, VARGHESE negative, but mostly likely ATN. Complements wnl. Plan renal biopsy for Monday, reviewed with patient and significant other. Concentrate IV meds as able. On pressor support. Preferable to keep MAP>60, SBP>110. Continue midodrine. Daily labs and weights, renally dose meds, strict I/O, no NSAIDs, no nephrotoxins. ASA results not found, UDS negative. Hep panel reviewed.      Hyperkalemia: resolved, monitor     Acidosis: improved     Septic and hemorrhagic shock: per ICU     upper GIB: Hgb improved, on PPI     Hyponatremia: resolved    Hyperphosphatemia: improved, no binders at this time    Hypocalcemia: improving, 3Ca bath with HD, vitamin D low and PTH elevated, start ergocalciferol    Edema: concentrate IVF, UF with HD    Resp failure: high flow NC per ICU, minimize fluids, UF with HD again tomrrow     Probable chronic liver disease and associated coagulopathy: note INR elevated, albumin low, ammonia elevated. Platelets wnl. AST elevated, ALT wnl. No documented h/o cirrhosis        Other management per primary service        All prior notes form other physicians and RN staff were reviewed from admission to current  day to help me make my clinical decisions    PMH/PSH/FH/SH reviewed and unchanged  Medications and labs reviewed

## 2020-01-25 NOTE — ASSESSMENT & PLAN NOTE
Mechanical ventilation support largely because of poor mental status.  Patient has a respiratory alkalosis which is classic for hepatic encephalopathy

## 2020-01-26 NOTE — PROGRESS NOTES
Patient tolerated 3 hrs HD and 2500 ml net UF. No acute distress during dialysis. RIJ CDI, no issues with BFR. Pressor was adjusted by Primary Rn for Bp support. See flow sheet for details. Verbal report given to Primary Rn.

## 2020-01-26 NOTE — CONSULTS
Reason for PC Consult: Advance Care Planning    Consulted by: Dr Montenegro     Assessment:  General: 53 yo female admitted on 1/20/2020 for Acute renal failure, Melena, anemia, Hyperkalemia. PMH: Sciatica    Pt has hx of ETOH abuse, pt presented to the ED due to ALOC, N/V, and blood in emesis for approx one week per per significant other. Boyfriend was also able to provide update the pt stopped drinking approx several days previously.     Dyspnea: Yes- intubated  Last BM: 01/25/20-    Pain: Unable to determine-    Depression: Unable to determine-    Dementia: Unable to Determine;       Spiritual:  Is Taoist or spirituality important for coping with this illness? Unable to determine-    Has a  or spiritual provider visit been requested? No    Palliative Performance Scale: 20%    Advance Directive: None on file.   DPOA: No, Emergency contact listed as Lizette Chow [phone]   POLST: None on file.     Code Status: Full     Outcome:  Arrived at the bedside, spoke with bedside RN,  no family or friends present at this time. Notes speak about possible, significant other, fiance, , relation unclear. No name or contact number.     Called pts Emergency contact, Lizette Chow, no answer, left message with request for call back to locate family to assist with POC.     Called and left message with weekend float SW with request for Next of Kin search.     Updated: Dr Montenegro, BS RN    Plan: Will continue to locate family to discuss POC/GOC.     Thank you for allowing Palliative Care to participate in this patient's care. Please feel free to call [phone] with any questions or concerns.

## 2020-01-26 NOTE — CARE PLAN
Adult Ventilation Update    Total Vent Days: 1    Patient Lines/Drains/Airways Status      Active Airway       Name: Placement date: Placement time: Site: Days:    Airway Oral 7.5  01/25/20   1536   Oral   1                  Events/Summary/Plan: Pt transitioned to bi-pap, improvement seen and able to titrated FiO2. Pt becoming agitated resumed HHFNC post dialysis and per MD suggestion, Pt didn't tolerate HHFNC for long, RN suggested ABG from art-line, concerning PaO2, and returned patient to Bi-PAP and MD notified. Planned intubation per Dr. Montenegro. Successful intubation on 1st attempt.

## 2020-01-26 NOTE — PROGRESS NOTES
Nephrology Daily Progress Note    Date of Service  1/24/2020     HISTORY OF PRESENT ILLNESS:    53yo female BIB EMS to ED for altered mentation. Patient is unable to provide history 2/2 AMS. History obtained from her significant other (at bedside) and ED staff. Patient stopped consuming EtOH ~2 weeks ago after history of excessive use. She did not taper or utilize BZDs. She has had hematemesis x 1-2 months and dark tarry stools for at least 2 weeks. She also stopped smoking and significant other repeatedly denies use of any illicit substance, new medications, or supplements except protein shakes. She has been taking ~2 ibuprofen tabs daily for 2 weeks. EMS reported patient minimally responsive and moaning upon their arrival.      Patient is hypotensive in ED, SBP 60s-100s, responsive to IVF. Hgb 4 at presentation, provided with IVF. She has been placed on PPI. She is acidotic and has significant hyponatremia and hyperkalemia. She had been provided with NS prior to consultation.      No known h/o renal disease or DM. Possible HTN associated with EtOH use per significant other. SCr 0.82 7/2011     UDS negative, ASA pending    Interval Problem Update  01/20 - consult done, HD provided  01/21 - tolerated HD, Na, K, CO2, BUN improved, poor UOP, alert this AM, she confirms no ingestions, cessation of EtOH use after excessive use, use of ~400mg ibuprofen daily x 2 weeks, as well as hematemesis and melena PTA  01/22 - SCr increased, Na near normal, K wnl, acidosis improved, possible US vs MRI today, had EGD which demonstrated ulcerative esophagitis, small hiatal hernia, portal HTN gastropathy  01/23 - no UOP, +edema  01/24 - high flow NC, 2L off with HD today, 8L positive for hospitalization  01/25 - high flow NC, HD again today, +9.3L for hospitalization  01/26 - required intubation, +10.4L for hospitalization, FLC ratio wnl    Review of Systems  Review of Systems   Unable to perform ROS: Intubated        Physical  Exam  Pulse:  [] 78  Resp:  [14-35] 20  BP: ()/(49-86) 110/70  SpO2:  [92 %-100 %] 99 %    Physical Exam  Constitutional:       General: She is not in acute distress.     Appearance: She is ill-appearing.      Comments: Thin   HENT:      Head: Normocephalic and atraumatic.      Nose: Nose normal.      Mouth/Throat:      Comments: ETT  Eyes:      General: Scleral icterus present.         Right eye: No discharge.         Left eye: No discharge.      Extraocular Movements: Extraocular movements intact.   Cardiovascular:      Rate and Rhythm: Normal rate and regular rhythm.      Heart sounds: No murmur.   Pulmonary:      Breath sounds: Rales present. No wheezing.      Comments: Intubated/ventilated  Abdominal:      General: Abdomen is flat. There is no distension.      Palpations: Abdomen is soft.   Musculoskeletal:      Right lower leg: Edema present.      Left lower leg: Edema present.   Skin:     General: Skin is warm and dry.   Neurological:      Mental Status: She is alert.      Comments: sedated   Psychiatric:      Comments: sedated         Fluids    Intake/Output Summary (Last 24 hours) at 1/26/2020 1046  Last data filed at 1/26/2020 0600  Gross per 24 hour   Intake 1096.28 ml   Output 0 ml   Net 1096.28 ml       Laboratory  Recent Labs     01/24/20  0400 01/25/20  0537 01/26/20  0550   WBC 23.0* 15.4* 13.4*   RBC 2.69* 2.64* 2.67*   HEMOGLOBIN 8.7* 8.6* 9.0*   HEMATOCRIT 28.1* 27.7* 27.7*   .5* 104.9* 103.7*   MCH 32.3 32.6 33.7*   MCHC 31.0* 31.0* 32.5*   RDW 93.0* 96.9* 94.6*   PLATELETCT 115* 94* 94*   MPV 12.5 11.9 12.3     Recent Labs     01/24/20  0400 01/25/20  0537 01/26/20  0550   SODIUM 136 137 133*   POTASSIUM 3.7 3.8 3.5*   CHLORIDE 95* 99 96   CO2 24 23 24   GLUCOSE 136* 123* 105*   BUN 52* 30* 20   CREATININE 4.35* 2.48* 1.90*   CALCIUM 7.2* 7.8* 8.3*         No results for input(s): NTPROBNP in the last 72 hours.            Imaging     Renal US per radiology read (reviewed):    IMPRESSION:     1.  No hydronephrosis or renal abnormality.  2.  Bladder is decompressed by Velez catheter.    Assessment/Plan     GENESIS: Etiology likely prolonged prerenal state, may be small contributing factor of NSAIDs use. UA reviewed, concerning for possible infection, no casts (except 0-2 hyaline) or crystals noted. Will repeat if able. Renal US without abnormality. PCR and ACR pending. ANCA negative, SPEP pending, VARGHESE negative, FLC ratio wnl, mostly likely ATN. Complements wnl. Plan renal biopsy for Monday, previously reviewed with patient and significant other. Concentrate IV meds as able. On pressor support. Preferable to keep MAP>60, SBP>110. Continue midodrine. Daily labs and weights, renally dose meds, strict I/O, no NSAIDs, no nephrotoxins. ASA results not found, UDS negative. Hep panel reviewed.      Hyperkalemia: resolved    Hypokalemia: correct with HD     Acidosis: improved     Septic and hemorrhagic shock: per ICU     upper GIB: Hgb improved, on PPI     Hyponatremia: improved    Edema: +10.4L, UF with HD as tolerated, plan for HD/UF today and tomorrow for volume    Hyperphosphatemia: improved, no binders at this time    Hypocalcemia: improved, 3Ca bath with HD as needed, vitamin D low and PTH elevated, started ergocalciferol    Resp failure: now intubated, vent per pulm, concentrate IVF, UF with HD     Probable chronic liver disease and associated coagulopathy: note INR elevated, albumin low, ammonia elevated. Platelets wnl. AST elevated, ALT wnl. No documented h/o cirrhosis        Other management per primary service        All prior notes form other physicians and RN staff were reviewed from admission to current day to help me make my clinical decisions    PMH/PSH/FH/SH reviewed and unchanged  Medications and labs reviewed

## 2020-01-26 NOTE — PROGRESS NOTES
RN attempted to notify patient's significant other, Elpidio regarding intubation but there is no contact information in patient's chart for Elpidio and patient is unable to provide this information.

## 2020-01-26 NOTE — PALLIATIVE CARE
Palliative Care follow-up  Received call back from pts Emergency Contact, Lizette Chow.     Lizette stated that she and the pt were  but have been  for approx two years. Lizette states that they have remained very good friends and that the pt had always continued to insist on keeping her as her Emergency contact. Lizette stated that they still have contact, and that approx two months ago, the pt was to move in with a friend, Juan or Elpidio as the pt was homeless.     Lizette stated that the pt had always wished to not include her family, but preferred to keep Lizette as her Emergency Contact, but that sometime in the past year the pt provided Lizette with the contact number for her brother. Lizette stated that she will attempt to come to the hospital today, she lives approx 50 miles away near [facility], and that she will search for the pts brothers number.   Let Lizette know her help is greatly appreciated.     Updated: Dr Montenegro, BS RN    Plan: TBD as pts admission progresses.     Thank you for allowing Palliative Care to support this patient and family. Contact [phone] for additional assistance, change in patient status, or with any questions/concerns.

## 2020-01-26 NOTE — DIETARY
"Nutrition Support/TF Assessment   Day 6 of admit.  Dyan Guevara is a 54 y.o. female with admitting DX of Acute Renal Failure, Melena, Anemia, Hyperkalemia.      Current problem list:  1. Acute respiratory failure with hypoxia   2. Alcoholic hepatitis without ascites  3. GENESIS (acute kidney injury)   4. Septic shock   5. Alcohol abuse   6. Metabolic acidosis   7. Lactic acidosis   8. Upper GI bleed   9. Hemorrhagic shock   10. Coagulopathy   11. Elevated troponin   12. Increased ammonia level   13. Seizure   14. Hyperkalemia            Assessment:  Estimated Nutritional Needs: based on:   Height: 167.6 cm (5' 6\")  Weight: 69.3 kg (152 lb 12.5 oz)  Weight to Use in Calculations: 65.8 kg (145 lb 1 oz)(admit wt per bedscale on . )  Ideal Body Weight: 59 kg (130 lb)  Percent Ideal Body Weight: 111.5  Body mass index is 24.66 kg/m²., BMI classification: Normal     Calculation/Equation: MSJ X 1.1=0835;  Anthony State Equation (PSU) [id number]  Total Calories / day: 0107-2223 (Calories / kg: ~23-26)  Total Grams Protein / day: 79-99 (Grams Protein / k.2 - 1.5)   Total Free Water/day: 6859-5032 ml/day (~30-35 ml/kg)     Evaluation:   1. TF consult received, pt on mechanical ventilation.   2. Enteral access per KUB : Feeding tube tip in the gastric body.  3. Pertinent Labs: Na+ 133, K+ 3.5, Creat 1.90, Alk Phos 220, , Last Phos taken on : 7.9  4. Pertinent Meds: Precedex, lactulose, ICU electrolyte replacement per pharmacy, Levophed, Zosyn, Bowel protocol, thiamine (completed medication last given yesterday), Vit D.   5. Pt last on diet on , PO minimal per ADL. NPO for past ~3 days. PT may be at risk for refeeding syndrome due to history of ETOH abuse.  6. Pt was being followed for poor PO intake by RD, see Dietary progress note on .    7. Will provide Diabetisource AC to best meet protein needs.      Malnutrition Risk: limited information, pt at increased risk related to hx of ETOH abuse with poor " intake per RD notes.      Recommendations/Plan:  1. Begin TF with Diabetisource AC @ 25 ml/hr and advance per TF protocol to goal rate of 55 ml/hr to provide 1584 kcals/day, 79 g prod/ay, 1077 ml free water/day.   2. Fluids per MD.  3. Monitor for refeeding: Order BMP w/ Mg and Phos x 7 days. Replete K, Phos and Mg prn. Supplement 100 mg Thiamine x 7 days to reduce risk of refeeding  4. RD following.

## 2020-01-26 NOTE — DISCHARGE PLANNING
Next of Kin Search completed for Palliative Care ANANYA Rucker. Results were faxed to [phone].

## 2020-01-26 NOTE — CARE PLAN
Adult Ventilation Update    Total Vent Days: 2      Patient Lines/Drains/Airways Status    Active Airway     Name: Placement date: Placement time: Site: Days:    Airway Oral 7.5  01/25/20   1536   Oral  less than 1               Static Compliance (ml / cm H2O): 19 (01/25/20 1812)    Patient failed trials because of    Barriers to SBT    Length of Weaning Trial      Sputum/Suction   Cough: Dry;Weak (01/25/20 1200)             Mobility  Level of Mobility: Level IV (01/24/20 2000)  Activity Performed: Unable to mobilize (01/25/20 1812)  Reason Not Mobilized: Unstable condition (01/25/20 1812)  Mobilization Comments: Increased O2 demands, vasopressor therapy (01/24/20 0800)    Events/Summary/Plan:

## 2020-01-27 NOTE — PALLIATIVE CARE
Palliative Care follow-up  Spoke with Lizette who was able to provide the pts fathers number and her brothers, Regino and Graeme Guevara. Face sheet has been updated.   Asked Lizette if they were legally , Lizette stated that they were not but that they were together for approx 10 yrs. Lizette stated that she has left a message with the pts brother, Graeme, but has not heard back at this time.   Lizette stated that she had MD appts in [place] over the next two days and will stop to see the pt.     Called and left message requesting call back to determine if Regino or Graeme are related.     Updated: Dr Lazcano, BS RN    Plan: Continue to attempt to reach pts family to discuss POC/GOC, DNR/DNI with possible CC if appropriate.     Thank you for allowing Palliative Care to support this patient and family. Contact [phone] for additional assistance, change in patient status, or with any questions/concerns.

## 2020-01-27 NOTE — CARE PLAN
Problem: Ventilation Defect:  Goal: Ability to achieve and maintain unassisted ventilation or tolerate decreased levels of ventilator support  Outcome: PROGRESSING SLOWER THAN EXPECTED   Adult Ventilation Update    Total Vent Days: 2    Patient Lines/Drains/Airways Status      Active Airway       Name: Placement date: Placement time: Site: Days:    Airway Oral 7.5  01/25/20   1536   Oral  2                    Patient failed trials because of Barriers to Wean: No Order (01/26/20 0647)    Sputum/Suction   Cough: Productive (01/26/20 1401)  Sputum Amount: Small (01/26/20 1401)  Sputum Color: Yellow (01/26/20 1401)  Sputum Consistency: Thick (01/26/20 1401)    Mobility  LActivity Performed: Unable to mobilize (01/25/20 2000)  Reason Not Mobilized: Unstable condition (01/25/20 2000)    Events/Summary/Plan: no vent changes made at this time

## 2020-01-27 NOTE — PROGRESS NOTES
Hd treatment started at 1313 and ended at 1716 with net UF of 3000 ml as bp tolerated. Right IJ CVC patent with good BFR entire treatment. See flow sheet for details.

## 2020-01-27 NOTE — PROGRESS NOTES
"Critical Care Progress Note    Date of admission  1/20/2020    Chief Complaint  AMS, hematemesis    Hospital Course    54 y.o. female with hx of alcohol abuse, hx of recent pancreatitis, upper GI bleed 2 months ago, who presented 1/20/2020 with altered mental status and abd pain. Pt was actively drinking until 2 weeks ago where she quit \"cold turkey\" per sundeep. History obtained from  and medical record. Since then, pt has been having N/V and in the past few days, pt developed progressive weakness and AMS. Sundeep reported small amount of blood in her emesis as well as in her stools. Pt was brought to ED. At ED, pt was hypotensive with BP in 60/40s. Also reported ED noted melanotic stools on the sheets and some blood around her mouth. Pt received 3L crystalloid fluid boluses. Lactate 13, creatinine 13, K 6.9. HCO3 7. Ceftriaxone started. Blood cultures obtained. INR 2.0, plt 215K     HyperK cocktail was given. Nephrology was consulted for emergent dialysis. Hgb 4.0, received 2U uncrossmatched PRBCs with 1U plat and 1U FFP. Per ED, pt's mental status is somewhat improved with fluids, but pt still moaning while I got there. Pt c/o epigastric pain. On 3L NC with sat >90%. SBP in 100s after fluid boluses.    1/20 s/p emergent dialysis  1/22 s/p EGD: ulcerative esophagitis, mild portal HTN gastropathy, small hiatus hernia.   1/25 intubated due to hypoxic resp failure from bilateral pulm edema       Interval Problem Update  Reviewed last 24 hour events:  Remains critically ill  Remains on mechanical ventilation   FiO2 40%, PEEP 8  Sedated with precedex 0.7  Remains on norepinephrine at 5mcg and vasopressin 0.03 to keep SBP >90  Afebrile, Tm 37.6C  HR in 60s, -120s.   Had dialysis yesterday with 2.5L fluid out.   MAP >60, HR in 100s  Hgb stable in 8.6.   WBC 13K  No urine output    Review of Systems  Review of Systems   Reason unable to perform ROS: limited due to mental status.        Vital Signs for last " 24 hours   Pulse:  [] 67  Resp:  [14-33] 28  BP: ()/(56-78) 107/59  SpO2:  [98 %-100 %] 100 %    Hemodynamic parameters for last 24 hours       Respiratory Information for the last 24 hours  [place] Vent Mode: APVCMV  Rate (breaths/min): 20  Vt Target (mL): 350  PEEP/CPAP: 8  FiO2: 30  P MEAN: 12  Control VTE (exp VT): 354    Physical Exam   Physical Exam  Vitals signs and nursing note reviewed.   Constitutional:       General: She is not in acute distress.     Appearance: She is ill-appearing and toxic-appearing. She is not diaphoretic.   HENT:      Head: Normocephalic and atraumatic.      Mouth/Throat:      Mouth: Mucous membranes are dry.      Comments: ET tube in place  Eyes:      General: Scleral icterus present.      Pupils: Pupils are equal, round, and reactive to light.   Neck:      Musculoskeletal: Neck supple.   Cardiovascular:      Rate and Rhythm: Normal rate and regular rhythm.      Pulses: Normal pulses.      Heart sounds: Normal heart sounds. No murmur.   Pulmonary:      Effort: No respiratory distress.      Breath sounds: Normal breath sounds. No wheezing, rhonchi or rales.      Comments: Diminished at bases  Minimal crackles  Abdominal:      General: There is no distension.      Palpations: Abdomen is soft.      Tenderness: There is no tenderness. There is no guarding.   Musculoskeletal:      Right lower leg: No edema.      Left lower leg: No edema.   Skin:     General: Skin is dry.      Coloration: Skin is jaundiced.      Findings: No bruising, erythema or rash.   Neurological:      General: No focal deficit present.      Mental Status: She is oriented to person, place, and time.         Medications  Current Facility-Administered Medications   Medication Dose Route Frequency Provider Last Rate Last Dose   • Pharmacy Consult: Enteral tube insertion - review meds/change route/product selection  1 Each Other PHARMACY TO DOSE Keith Montenegro D.O.       • midodrine (PROAMATINE) tablet 15 mg  15  mg Enteral Tube Q8HRS OPAL Gonzales.OTucker   15 mg at 01/26/20 1402   • [START ON 1/27/2020] prednisoLONE (PRELONE) 15 MG/5ML syrup 39.9 mg  39.9 mg Enteral Tube DAILY REILLY GonzalesO.       • lactulose 20 GM/30ML solution 30 mL  30 mL Enteral Tube TID REILLY GonzalesOTucker   30 mL at 01/26/20 1832   • riFAXIMin (XIFAXAN) tablet 550 mg  550 mg Enteral Tube BID OPAL Gonzales.O.   550 mg at 01/26/20 1832   • piperacillin-tazobactam (ZOSYN) 4.5 g in  mL IVPB  4.5 g Intravenous Q12HRS REILLY GonzalesO. 25 mL/hr at 01/26/20 1832 4.5 g at 01/26/20 1832   • vitamin D (Ergocalciferol) (DRISDOL) capsule 50,000 Units  50,000 Units Oral Q7 DAYS Siri Day D.O.   Stopped at 01/25/20 0930   • fentaNYL (SUBLIMAZE) injection 50 mcg  50 mcg Intravenous Q HOUR PRN REILLY GonzalesOTucker   50 mcg at 01/26/20 1225   • dexmedetomidine (PRECEDEX) 400 mcg/100mL NS premix infusion  0.1-1.5 mcg/kg/hr Intravenous Continuous REILLY GonzalesOTucker 12.1 mL/hr at 01/26/20 1830 0.7 mcg/kg/hr at 01/26/20 1830   • Respiratory Care per Protocol   Nebulization Continuous RT Keith Montenegro D.O.       • ipratropium-albuterol (DUONEB) nebulizer solution  3 mL Nebulization Q2HRS PRN (RT) Keith Montenegro D.O.       • MD Alert...ICU Electrolyte Replacement per Pharmacy   Other PHARMACY TO DOSE Keith Montenegro D.O.       • lidocaine (XYLOCAINE) 1 % injection 1-2 mL  1-2 mL Tracheal Tube Q30 MIN PRN Keith Montenegro D.O.       • norepinephrine (LEVOPHED) 16 mg in  mL Infusion (NOTE CONCENTRATION)  0-30 mcg/min Intravenous Continuous REILLY GonzalesOTucker 4.7 mL/hr at 01/26/20 1831 5 mcg/min at 01/26/20 1831   • LORazepam (ATIVAN) injection 2-4 mg  2-4 mg Intravenous Q HOUR PRN Keith Montenegro D.O.       • pantoprazole (PROTONIX) injection 40 mg  40 mg Intravenous BID Keith Montenegro D.O.   40 mg at 01/26/20 1837   • Pharmacy Consult Request - to monitor for nephrotoxic agents  1 Each Other PHARMACY TO DOSE Anthony Pardo D.O.       • lactated ringers infusion (BOLUS): BMI less than or  equal to 30  30 mL/kg Intravenous Once PRN Anthony Pardo D.O.       • vasopressin (VASOSTRICT) 20 Units in  mL Infusion  0.03 Units/min Intravenous Continuous Keith Montenegro D.O. 9 mL/hr at 01/26/20 1101 0.03 Units/min at 01/26/20 1101   • heparin injection 2,400 Units  2,400 Units Intracatheter DIALYSIS PRN Siri Day D.O.   2,400 Units at 01/26/20 1716       Fluids    Intake/Output Summary (Last 24 hours) at 1/26/2020 1958  Last data filed at 1/26/2020 1832  Gross per 24 hour   Intake 1868.02 ml   Output 3500 ml   Net -1631.98 ml       Laboratory  Recent Labs     01/25/20  1451 01/25/20  1749 01/26/20  0448   ISTATAPH 7.513* 7.446 7.503*   ISTATAPCO2 39.2* 44.8* 34.0   ISTATAPO2 52* 317* 111*   ISTATATCO2 33 32 28   [id number] 90* 100* 99   ISTATARTHCO3 31.5* 30.9* 26.7*   ISTATARTBE 8* 6* 4*   ISTATTEMP 36.1 C 36.6 C 37.6 C   ISTATFIO2 80 100 50   ISTATSPEC Arterial Arterial Arterial   ISTATAPHTC 7.527* 7.452 7.494   [id number] 49* 315* 115*         Recent Labs     01/24/20  0400 01/25/20  0537 01/26/20  0550   SODIUM 136 137 133*   POTASSIUM 3.7 3.8 3.5*   CHLORIDE 95* 99 96   CO2 24 23 24   BUN 52* 30* 20   CREATININE 4.35* 2.48* 1.90*   PHOSPHORUS  --  3.7  --    CALCIUM 7.2* 7.8* 8.3*     Recent Labs     01/24/20  0400 01/25/20  0537 01/26/20  0550   ALTSGPT 33 40 43   ASTSGOT 145* 147* 153*   ALKPHOSPHAT 200* 195* 220*   TBILIRUBIN 11.9* 14.4* 15.9*   GLUCOSE 136* 123* 105*     Recent Labs     01/24/20  0400 01/25/20  0537 01/26/20  0550   WBC 23.0* 15.4* 13.4*   NEUTSPOLYS 94.00* 94.80* 86.90*   LYMPHOCYTES 1.20* 3.50* 6.10*   MONOCYTES 3.00 1.70 2.60   EOSINOPHILS 0.00 0.00 0.00   BASOPHILS 0.80 0.00 0.00   ASTSGOT 145* 147* 153*   ALTSGPT 33 40 43   ALKPHOSPHAT 200* 195* 220*   TBILIRUBIN 11.9* 14.4* 15.9*     Recent Labs     01/24/20  0400 01/25/20  0537 01/26/20  0550   RBC 2.69* 2.64* 2.67*   HEMOGLOBIN 8.7* 8.6* 9.0*   HEMATOCRIT 28.1* 27.7* 27.7*   PLATELETCT 115* 94* 94*        Imaging  X-Ray:  I have personally reviewed the images and compared with prior images.    Assessment/Plan  * Acute respiratory failure with hypoxia (HCC)  Assessment & Plan  Likely due to pulmonary edema, GENESIS, anuric  Continue full vent support to keep sat >90%  Sedation with fentanyl and precedex.   Daily SAT and SBT  Mobility if able. Goal level 2      Alcoholic hepatitis without ascites  Assessment & Plan  LFT suggests alcoholic hepatitis  AST >ALT, Total bili is slowly rising  Maddrey DF score is 39 which he will benefit from steroids.   Completed 5 day course of hydrocortisone x5 days (1/20-1/25), start prednisolone 40mg daily to complete total 21 days   Lactulose 30 BID prn to prevent hepatic encephalopathy. Start if BM <3/day      Septic shock (HCC)  Assessment & Plan  This is Septic shock Present on admission  SIRS criteria identified on my evaluation include: Hypothermia, with temperature less than 96.8 deg F, Tachycardia, with heart rate greater than 90 BPM, Tachypnea, with respirations greater than 20 per minute and Leukocyosis, with WBC greater than 12,000  Source is Upper GI bleed, GI  Presentation includes: Severe sepsis present and initial Lactate level result is >= 4 mmol/L.   Despite appropriate fluid resuscitation with crystalloid given per sepsis guidelines, the patient remains hypotensive with systolic blood pressure less than 90 or MAP less than 65  Hemodynamic support with additional fluids and IV vasopressors as needed to maintain a SBP of 90 or MAP of 65  IV antibiotics as appropriate for source of sepsis  Reassessment: I have reassessed the patient's hemodynamic status    Unknown source. BCx negative. Imaging not showing any infectious foci.   CT A/P, US and MRI liver did not show any evidence of abscess  Continue norepinephrine gtt and vasopressin to actively titrate to goal MAP >60 or SBP >90  Continue piptazo x 7 days (end date 1/27)  Nasal MRSA screen negative  Blood cultures  negative.   Completed hydrocortisone 50 Q6H x 5days 1/20-1/25   Completed thiamine 200 IV Q12H x 5 days 1/20-1/25  Continue midodrine 15 TID    GENESIS (acute kidney injury) ([facility])  Assessment & Plan  Likely due to ATN  Life-threatening hyperkalemia at admission s/p emergent dialysis 1/20 - resolved  US kidney without evidence of hydronephrosis  Pt has been anuric since admission, renal function is not recovering  Has been getting dialysis daily. 2.5L off     Coagulopathy (HCC)  Assessment & Plan  Liver related  TEG with prolonged R, MA ok  INR 2.0  Plt in 200s, fibrinogen in 500s  Completed vitamin K 10mg IV daily x 3 days 1/20-1/22      Hemorrhagic shock ([facility])  Assessment & Plan  Due to Upper GI bleed, in the setting of active alcohol abuse. - resolved. Bleeding seems to have stopped.   S/p 4U PRBCs, 1U FFP on 1/20  No further blood transfusions  Transfuse to keep Hgb >7      Upper GI bleed  Assessment & Plan  Evidence of hematemesis and melena at admission  Pt's Hgb 4.0 at admission   S/p 4U PRBCs and 2U FFPs  EGD 1/22 by GI and noted ulcerative esophagitis. No esophageal varices  Continue protonix 40 BID  Ionized calcium is still low and will replete calcium chloride.   Advance diet as tolerated  Repeat iron studies in 3 days.     Lactic acidosis  Assessment & Plan  High lactate at 13 due to septic shock, hemorrhagic shock, in the setting of alcoholic abuse  Resolved      Metabolic acidosis  Assessment & Plan  Due to lactic acidosis  Resolved. Bicarb gtt was stopped    Alcohol abuse  Assessment & Plan  Per sundeep and patient, quit 2 weeks ago  Banana bag with thiamine, mag, multivitamin completed at admission   Pt not in withdrawal. CIWA protocol was discontinued 1/21    Seizure ([facility])  Assessment & Plan  One episode of possible seizure on the evening on 1/20. No further episodes noted on 1/21 until present  Pt alert, oriented.   Ativan 2-4 mg IV [id number] prn seizures only     Hyperkalemia  Assessment &  Plan  Resolved. Life threatening hyperkalemia 6.9 at admission. No ECG changes. S/p hyperK cocktail and emergent dialysis 1/20       Palliative care has been consulted to discuss goal of care discussion     VTE:  Contraindicated  Ulcer: PPI  Lines: Central Line  Ongoing indication addressed, Arterial Line  Ongoing indication addressed and Velez Catheter  Ongoing indication addressed    I have performed a physical exam and reviewed and updated ROS and Plan today (1/26/2020). In review of yesterday's note (1/25/2020), there are no changes except as documented above.     Discussed patient condition and risk of morbidity and/or mortality with Family, RN, RT, Pharmacy, Charge nurse / hot rounds and Patient  The patient remains critically ill.  Critical care time = 42 minutes in directly providing and coordinating critical care and extensive data review.  No time overlap and excludes procedures.

## 2020-01-27 NOTE — CARE PLAN
Problem: Safety  Goal: Will remain free from falls  Outcome: PROGRESSING AS EXPECTED  Intervention: Implement fall precautions  Flowsheets  Taken 1/26/2020 2049  Bed Alarm: Yes - Alarm On  Taken 1/26/2020 2000  Environmental Precautions: Treaded Slipper Socks on Patient;Report Given to Other Health Care Providers Regarding Fall Risk;Bed in Low Position;Communication Sign for Patients & Families;Mobility Assessed & Appropriate Sign Placed     Problem: Safety - Medical Restraint  Goal: Remains free of injury from restraints (Restraint for Interference with Medical Device)  Description  INTERVENTIONS:  1. Determine that other, less restrictive measures have been tried or would not be effective before applying the restraint  2. Evaluate the patient's condition at the time of restraint application  3. Inform patient/family regarding the reason for restraint  4. Q2H: Monitor safety, psychosocial status, comfort, nutrition and hydration  Outcome: PROGRESSING AS EXPECTED     Problem: Communication  Goal: The ability to communicate needs accurately and effectively will improve  Outcome: PROGRESSING SLOWER THAN EXPECTED  Intervention: Reorient patient to environment as needed  Flowsheets (Taken 1/26/2020 2049)  Oriented to:: All of the Following : Location of Bathroom, Visiting Policy, Unit Routine, Call Light and Bedside Controls, Bedside Rail Policy, Smoking Policy, Rights and Responsibilities, Bedside Report, and Patient Education Notebook  Note:   Remains vented and subdued; unable to communicate. Unable to assess orientation     Problem: Infection  Goal: Will remain free from infection  Outcome: PROGRESSING SLOWER THAN EXPECTED     Problem: Bowel/Gastric:  Goal: Normal bowel function is maintained or improved  Outcome: PROGRESSING SLOWER THAN EXPECTED     Problem: Respiratory:  Goal: Respiratory status will improve  Outcome: PROGRESSING SLOWER THAN EXPECTED     Problem: Urinary Elimination:  Goal: Ability to  reestablish a normal urinary elimination pattern will improve  Outcome: PROGRESSING SLOWER THAN EXPECTED     Problem: Skin Integrity  Goal: Risk for impaired skin integrity will decrease  Outcome: PROGRESSING SLOWER THAN EXPECTED  Intervention: Implement precautions to protect skin integrity in collaboration with the interdisciplinary team  Note:   Wound consult placed today

## 2020-01-27 NOTE — PROGRESS NOTES
Nephrology Daily Progress Note    Date of Service  1/27/2020     HISTORY OF PRESENT ILLNESS:    53yo female BIB EMS to ED for altered mentation. Patient is unable to provide history 2/2 AMS. History obtained from her significant other (at bedside) and ED staff. Patient stopped consuming EtOH ~2 weeks ago after history of excessive use. She did not taper or utilize BZDs. She has had hematemesis x 1-2 months and dark tarry stools for at least 2 weeks. She also stopped smoking and significant other repeatedly denies use of any illicit substance, new medications, or supplements except protein shakes. She has been taking ~2 ibuprofen tabs daily for 2 weeks. EMS reported patient minimally responsive and moaning upon their arrival.      Patient is hypotensive in ED, SBP 60s-100s, responsive to IVF. Hgb 4 at presentation, provided with IVF. She has been placed on PPI. She is acidotic and has significant hyponatremia and hyperkalemia. She had been provided with NS prior to consultation.      No known h/o renal disease or DM. Possible HTN associated with EtOH use per significant other. SCr 0.82 7/2011     UDS negative, ASA pending    Interval Problem Update  01/20 - consult done, HD provided  01/21 - tolerated HD, Na, K, CO2, BUN improved, poor UOP, alert this AM, she confirms no ingestions, cessation of EtOH use after excessive use, use of ~400mg ibuprofen daily x 2 weeks, as well as hematemesis and melena PTA  01/22 - SCr increased, Na near normal, K wnl, acidosis improved, possible US vs MRI today, had EGD which demonstrated ulcerative esophagitis, small hiatal hernia, portal HTN gastropathy  01/23 - no UOP, +edema  01/24 - high flow NC, 2L off with HD today, 8L positive for hospitalization  01/25 - high flow NC, HD again today, +9.3L for hospitalization  01/26 - required intubation, +10.4L for hospitalization, FLC ratio wnl  01/27 - intubated, opens eyes but otherwise not following commands. Renal bx canceled due  to intubation/critical illness. Palliative team attempting to reach family for GOC discussion. No UOP. Cr/lytes stable, plan for HD tomorrow.     Review of Systems  Review of Systems   Unable to perform ROS: Intubated        Physical Exam  Temp:  [36.1 °C (97 °F)-36.9 °C (98.5 °F)] 36.9 °C (98.5 °F)  Pulse:  [59-83] 76  Resp:  [16-31] 22  BP: ()/(52-78) 85/57  SpO2:  [96 %-100 %] 99 %    Physical Exam  Constitutional:       General: She is not in acute distress.     Appearance: She is ill-appearing.      Comments: Thin   HENT:      Head: Normocephalic and atraumatic.      Nose: Nose normal.      Mouth/Throat:      Comments: ETT  Eyes:      General: Scleral icterus present.         Right eye: No discharge.         Left eye: No discharge.      Extraocular Movements: Extraocular movements intact.   Cardiovascular:      Rate and Rhythm: Normal rate and regular rhythm.      Heart sounds: No murmur.   Pulmonary:      Breath sounds: Rales present. No wheezing.      Comments: Intubated/ventilated  Abdominal:      General: Abdomen is flat. There is no distension.      Palpations: Abdomen is soft.   Musculoskeletal:      Right lower leg: Edema present.      Left lower leg: Edema present.   Skin:     General: Skin is warm and dry.   Neurological:      Mental Status: She is alert.      Comments: sedated   Psychiatric:      Comments: sedated         Fluids    Intake/Output Summary (Last 24 hours) at 1/27/2020 1537  Last data filed at 1/27/2020 1400  Gross per 24 hour   Intake 2296.01 ml   Output 3500 ml   Net -1203.99 ml       Laboratory  Recent Labs     01/25/20  0537 01/26/20  0550 01/27/20  0510   WBC 15.4* 13.4* 16.2*   RBC 2.64* 2.67* 2.57*   HEMOGLOBIN 8.6* 9.0* 8.7*   HEMATOCRIT 27.7* 27.7* 27.2*   .9* 103.7* 105.8*   MCH 32.6 33.7* 33.9*   MCHC 31.0* 32.5* 32.0*   RDW 96.9* 94.6* 96.9*   PLATELETCT 94* 94* 95*   MPV 11.9 12.3 13.3*     Recent Labs     01/25/20  0537 01/26/20  0550 01/27/20  0510   SODIUM 137  133* 136   POTASSIUM 3.8 3.5* 3.6   CHLORIDE 99 96 98   CO2 23 24 27   GLUCOSE 123* 105* 154*   BUN 30* 20 22   CREATININE 2.48* 1.90* 1.57*   CALCIUM 7.8* 8.3* 8.3*         No results for input(s): NTPROBNP in the last 72 hours.            Imaging     Renal US per radiology read (reviewed):   IMPRESSION:     1.  No hydronephrosis or renal abnormality.  2.  Bladder is decompressed by Velez catheter.    Assessment/Plan     GENESIS: Etiology likely prolonged prerenal state, may be small contributing factor of NSAIDs use. UA reviewed, concerning for possible infection, no casts (except 0-2 hyaline) or crystals noted. Will repeat if able. Renal US without abnormality. PCR and ACR pending. ANCA negative, SPEP pending, VARGHESE negative, FLC ratio wnl, mostly likely ATN. Complements wnl. Renal biopsy canceled due to intubation/critical illness.   -Concentrate IV meds as able.   -On pressor support.   -Preferable to keep MAP>60, SBP>110.   -Continue midodrine.   -Daily labs and weights, renally dose meds, strict I/O, no NSAIDs, no nephrotoxins. ASA results not found, UDS negative. Hep panel reviewed.      Hyperkalemia: resolved    Hypokalemia: stable, corrected with HD     Acidosis: improved, though increased LA 1/20 to 2.3, HD on 1/21     Septic and hemorrhagic shock: per ICU     upper GIB: Hgb improved, on PPI     Hyponatremia: improved    Edema: +10.4L, UF with HD as tolerated, plan for HD/UF today and tomorrow for volume    Hyperphosphatemia: improved, no binders at this time    Hypocalcemia: improved, 3Ca bath with HD as needed, vitamin D low and PTH elevated, started ergocalciferol    Resp failure: now intubated, vent per pulm, concentrate IVF, UF with HD     Probable chronic liver disease and associated coagulopathy: note INR elevated, albumin low, ammonia elevated. Platelets wnl. AST elevated, ALT wnl. No documented h/o cirrhosis      Other management per primary service       PMH/PSH/FH/SH reviewed and  unchanged  Medications and labs reviewed

## 2020-01-28 NOTE — PROGRESS NOTES
Call placed to palliative care to update about Dr. Lazcano's discussion with fiance/ boyfriend at bedside.  Palliative care has been working on this case to search for family.  Palliative care representative to come bedside soon and speak with fiance/boyfriend.

## 2020-01-28 NOTE — PROGRESS NOTES
"Critical Care Progress Note    Date of admission  1/20/2020    Chief Complaint  AMS, hematemesis    Hospital Course    54 y.o. female with hx of alcohol abuse, hx of recent pancreatitis, upper GI bleed 2 months ago, who presented 1/20/2020 with altered mental status and abd pain. Pt was actively drinking until 2 weeks ago where she quit \"cold turkey\" per sundeep. History obtained from  and medical record. Since then, pt has been having N/V and in the past few days, pt developed progressive weakness and AMS. Sundeep reported small amount of blood in her emesis as well as in her stools. Pt was brought to ED. At ED, pt was hypotensive with BP in 60/40s. Also reported ED noted melanotic stools on the sheets and some blood around her mouth. Pt received 3L crystalloid fluid boluses. Lactate 13, creatinine 13, K 6.9. HCO3 7. Ceftriaxone started. Blood cultures obtained. INR 2.0, plt 215K     HyperK cocktail was given. Nephrology was consulted for emergent dialysis. Hgb 4.0, received 2U uncrossmatched PRBCs with 1U plat and 1U FFP. Per ED, pt's mental status is somewhat improved with fluids, but pt still moaning while I got there. Pt c/o epigastric pain. On 3L NC with sat >90%. SBP in 100s after fluid boluses.    1/20 s/p emergent dialysis  1/22 s/p EGD: ulcerative esophagitis, mild portal HTN gastropathy, small hiatus hernia.   1/25 intubated due to hypoxic resp failure from bilateral pulm edema       Interval Problem Update  Reviewed last 24 hour events:  Mechanical ventilation day 10  Potential dialysis today per renal  Remains profoundly encephalopathic poorly responsive to noxious stimulation despite receiving lactulose and rifaximin  Lactate elevated again  Patient is on increasing doses of norepinephrine as well as vasopressin  Profoundly jaundiced and coagulopathic  Anuric  No evidence of active bleeding  Discussed prognosis with patient's boyfriend/fiancé today noting extremely poor prognosis and medical " indications for transfer to comfort care  Consulted with hospitalist to get independent confirmation of prognosis      Prior  Patient remains on mechanical ventilator day 9  No dialysis today per renal anticipate dialysis tomorrow  Patient remains poorly responsive on Precedex not following commands  Remains heavily jaundiced with elevated bilirubin and coagulopathic  Remains anuric  Afebrile  Remains on pressors  Continue progressive multiorgan failure, grim prognosis for survival  Appreciate input from palliative care, no family me available to assist with decision-making although prognosis is poor in any event and transition to comfort care soon seems inevitable given patient's very poor status/poor prognosis    Prior 24 hours  Remains critically ill  Remains on mechanical ventilation   FiO2 40%, PEEP 8  Sedated with precedex 0.7  Remains on norepinephrine at 5mcg and vasopressin 0.03 to keep SBP >90  Afebrile, Tm 37.6C  HR in 60s, -120s.   Had dialysis yesterday with 2.5L fluid out.   MAP >60, HR in 100s  Hgb stable in 8.6.   WBC 13K  No urine output    Review of Systems  Review of Systems   Unable to perform ROS: Intubated (limited due to mental status)        Vital Signs for last 24 hours   Temp:  [36.7 °C (98 °F)-37.2 °C (99 °F)] 36.9 °C (98.5 °F)  Pulse:  [] 89  Resp:  [15-27] 19  BP: ()/(54-87) 109/71  SpO2:  [97 %-100 %] 99 %    Hemodynamic parameters for last 24 hours       Respiratory Information for the last 24 hours  Malcolm Vent Mode: APVCMV  Rate (breaths/min): 12  Vt Target (mL): 350  PEEP/CPAP: 8  FiO2: 30  P Support: 5  P MEAN: 12  Length of Weaning Trial (Hours): 1  Control VTE (exp VT): 365    Physical Exam   Physical Exam  Vitals signs (Chronically ill, wasted heavily jaundiced) and nursing note reviewed.   Constitutional:       General: She is not in acute distress.     Appearance: She is ill-appearing and toxic-appearing. She is not diaphoretic.   HENT:      Head:  Normocephalic and atraumatic.      Mouth/Throat:      Mouth: Mucous membranes are dry.      Comments: ET tube in place  Eyes:      General: Scleral icterus present.      Pupils: Pupils are equal, round, and reactive to light.   Neck:      Musculoskeletal: Neck supple.   Cardiovascular:      Rate and Rhythm: Normal rate and regular rhythm.      Pulses: Normal pulses.      Heart sounds: Normal heart sounds. No murmur.   Pulmonary:      Effort: No respiratory distress.      Breath sounds: Normal breath sounds. No wheezing, rhonchi or rales.      Comments: Diminished at bases  Minimal crackles  Abdominal:      General: Abdomen is flat. There is no distension.      Palpations: Abdomen is soft.      Tenderness: There is no tenderness. There is no guarding.   Musculoskeletal:      Right lower leg: No edema.      Left lower leg: No edema.   Skin:     General: Skin is dry.      Coloration: Skin is jaundiced.      Findings: No bruising, erythema or rash.   Neurological:      Comments: Poorly responsive minimal withdrawal         Medications  Current Facility-Administered Medications   Medication Dose Route Frequency Provider Last Rate Last Dose   • fentaNYL (SUBLIMAZE) injection  mcg   mcg Intravenous Q2HRS PRN Kermit Lazcano M.D.       • sodium phosphate 30 mmol in 1/2  mL ivpb  30 mmol Intravenous Once Kermit Lazcano M.D. 83.3 mL/hr at 01/28/20 1224 30 mmol at 01/28/20 1224   • omeprazole (FIRST-OMEPRAZOLE) 2 mg/mL oral susp 40 mg  40 mg Enteral Tube Q12HRS Kermit Lzacano M.D.       • thiamine tablet 100 mg  100 mg Enteral Tube DAILY Kermit Lazcano M.D.   100 mg at 01/28/20 0506   • Pharmacy Consult: Enteral tube insertion - review meds/change route/product selection  1 Each Other PHARMACY TO DOSE Keith Montenegro D.O.       • midodrine (PROAMATINE) tablet 15 mg  15 mg Enteral Tube Q8HRS Keith Montenegro D.O.   15 mg at 01/28/20 0506   • prednisoLONE (PRELONE) 15 MG/5ML syrup 39.9 mg  39.9 mg Enteral  Tube DAILY REILLY GonzalesOTucker   39.9 mg at 01/28/20 0505   • lactulose 20 GM/30ML solution 30 mL  30 mL Enteral Tube TID Kermit Lazcano M.D.   30 mL at 01/28/20 1223   • riFAXIMin (XIFAXAN) tablet 550 mg  550 mg Enteral Tube BID REILLY GonzalesOTucker   550 mg at 01/28/20 0505   • dexmedetomidine (PRECEDEX) 400 mcg/100mL NS premix infusion  0.1-1.5 mcg/kg/hr Intravenous Continuous REILLY GonzalesO.   Stopped at 01/28/20 0930   • Respiratory Care per Protocol   Nebulization Continuous RT Keith Montenegro D.O.       • ipratropium-albuterol (DUONEB) nebulizer solution  3 mL Nebulization Q2HRS PRN (RT) Keith Montenegro D.O.       • lidocaine (XYLOCAINE) 1 % injection 1-2 mL  1-2 mL Tracheal Tube Q30 MIN PRN Keith Montenegro D.O.       • norepinephrine (LEVOPHED) 16 mg in  mL Infusion (NOTE CONCENTRATION)  0-30 mcg/min Intravenous Continuous REILLY GonzalesOTucker 6.6 mL/hr at 01/28/20 0922 7 mcg/min at 01/28/20 0922   • LORazepam (ATIVAN) injection 2-4 mg  2-4 mg Intravenous Q HOUR PRN REILLY GonzalesOTucker       • Pharmacy Consult Request - to monitor for nephrotoxic agents  1 Each Other PHARMACY TO DOSE Anthony Pardo D.O.       • lactated ringers infusion (BOLUS): BMI less than or equal to 30  30 mL/kg Intravenous Once PRN Anthony Pardo D.O.       • vasopressin (VASOSTRICT) 20 Units in  mL Infusion  0.03 Units/min Intravenous Continuous Keith Montenegro D.O. 9 mL/hr at 01/28/20 0935 0.03 Units/min at 01/28/20 0935   • heparin injection 2,400 Units  2,400 Units Intracatheter DIALYSIS PRN Siri Day D.O.   2,400 Units at 01/26/20 1716       Fluids    Intake/Output Summary (Last 24 hours) at 1/28/2020 1340  Last data filed at 1/28/2020 1230  Gross per 24 hour   Intake 2221.52 ml   Output 410 ml   Net 1811.52 ml       Laboratory  Recent Labs     01/26/20  0448 01/27/20  0546 01/28/20  0426   ISTATAPH 7.503* 7.515* 7.517*   ISTATAPCO2 34.0 22.0* 28.4   ISTATAPO2 111* 73 113*   ISTATATCO2 28 18* 24   GIULOFJ2GCW 99 96 99   ISTATARTHCO3  26.7* 17.8 23.1   ISTATARTBE 4* -5* 1   ISTATTEMP 37.6 C 97.0 F 98.7 F   ISTATFIO2 50 30 30   ISTATSPEC Arterial Arterial Arterial   ISTATAPHTC 7.494 7.529* 7.517*   [id number] 115* 69 113*         Recent Labs     01/27/20  0510 01/28/20 0227 01/28/20  0350   SODIUM 136 139 136   POTASSIUM 3.6 3.6 3.5*   CHLORIDE 98 100 99   CO2 27 25 24   BUN 22 46* 47*   CREATININE 1.57* 2.22* 2.39*   MAGNESIUM 1.9 2.7* 2.7*   PHOSPHORUS 1.0* <1.0* <1.0*   CALCIUM 8.3* 8.3* 8.0*     Recent Labs     01/27/20  0510 01/28/20 0227 01/28/20  0350   ALTSGPT 49 52* 54*   ASTSGOT 159* 174* 171*   ALKPHOSPHAT 262* 368* 348*   TBILIRUBIN 17.5* 19.9* 20.1*   PREALBUMIN 6.0* 5.0*  --    GLUCOSE 154* 185* 172*     Recent Labs     01/26/20  0550 01/27/20  0510 01/28/20 0227 01/28/20  0350   WBC 13.4* 16.2* 19.5*  --    NEUTSPOLYS 86.90* 93.00* 91.80*  --    LYMPHOCYTES 6.10* 3.50* 3.00*  --    MONOCYTES 2.60 2.60 3.30  --    EOSINOPHILS 0.00 0.90 0.50  --    BASOPHILS 0.00 0.00 0.70  --    ASTSGOT 153* 159* 174* 171*   ALTSGPT 43 49 52* 54*   ALKPHOSPHAT 220* 262* 368* 348*   TBILIRUBIN 15.9* 17.5* 19.9* 20.1*     Recent Labs     01/26/20  0550 01/27/20  0510 01/28/20  0227   RBC 2.67* 2.57* 2.62*   HEMOGLOBIN 9.0* 8.7* 8.7*   HEMATOCRIT 27.7* 27.2* 28.0*   PLATELETCT 94* 95* 99*   IRON  --   --  58   TOTIRONBC  --   --  105*       Imaging  X-Ray:  I have personally reviewed the images and compared with prior images.    Assessment/Plan  * Acute respiratory failure with hypoxia (HCC)  Assessment & Plan  Mechanical ventilation support largely because of poor mental status.  Patient has a respiratory alkalosis which is classic for hepatic encephalopathy      Alcoholic hepatitis without ascites  Assessment & Plan  End-stage liver disease with very high meld score predicting extremely high 90-day mortality rate as discussed.  Unfortunately patient does not appear to have any correctable features.  Continue aggressive supportive care as we  discuss goals of care with family and get a second opinion regarding prognosis      Septic shock (HCC)  Assessment & Plan  Patient ryan on pressors.  This is could be due to sepsis but also due to end-stage liver disease where people classically have very low blood pressures patient.  Patient ryan on pressors as well as Midrin.  Will likely will need to tolerate a systolic in the 80s and see if she has organ perfusion with that.    Increasing lactate increasing pressor requirement likely secondary to poor liver function.  Also could be recurrent sepsis bleak overall clinical picture    GENESIS (acute kidney injury) (HCC)  Assessment & Plan  Hepatorenal syndrome with renal failure, potential for dialysis today given increasing lactate and metabolic acidosis per renal.  Poor prognosis.  % of patients with acute hepatorenal syndrome requiring dialysis who are not candidates for liver transplant, as is the case in this patient, will die on ICU admission.    Coagulopathy (HCC)  Assessment & Plan  Liver related  TEG with prolonged R, MA ok  INR 2.0  Plt in 200s, fibrinogen in 500s  Completed vitamin K 10mg IV daily x 3 days 1/20-1/22    Coagulopathy likely secondary to burnout liver with limited ability to make coagulation factors      Hemorrhagic shock (HCC)  Assessment & Plan  Due to Upper GI bleed, in the setting of active alcohol abuse. - resolved. Bleeding seems to have stopped.   S/p 4U PRBCs, 1U FFP on 1/20  No further blood transfusions  Transfuse to keep Hgb >7    Resolved      Upper GI bleed  Assessment & Plan  Evidence of hematemesis and melena at admission  Pt's Hgb 4.0 at admission   S/p 4U PRBCs and 2U FFPs  EGD 1/22 by GI and noted ulcerative esophagitis. No esophageal varices  Continue protonix 40 BID  Ionized calcium is still low and will replete calcium chloride.   Advance diet as tolerated  Repeat iron studies in 3 days.     No evidence of ongoing bleeding today    Lactic acidosis  Assessment &  Plan  Increasing lactate today unclear etiology with increased pressor requirement.  We will reevaluate fluid status and infectious disease status.  Elevated lactate is likely partly related least to poor liver function and inability to metabolize lactate to pyruvate.      Metabolic acidosis  Assessment & Plan  Acidosis resolving with dialysis continue to monitor    Alcohol abuse  Assessment & Plan  Patient's for supportive therapy with multiorgan failure on artificial life support including dialysis pressor agents for shock mechanical ventilation respiratory failure rifaximin and lactulose for neurologic failure from hepatic encephalopathy.  Again bleak prognosis    Increased ammonia level  Assessment & Plan  Hepatic encephalopathy clinically as noted treat with lactulose and rifaximin    Seizure (HCC)  Assessment & Plan  One episode of possible seizure on the evening on 1/20. No further episodes noted on 1/21 until present  Pt alert, oriented.   Ativan 2-4 mg IV [id number] prn seizures only     Hyperkalemia  Assessment & Plan  Resolved. Life threatening hyperkalemia 6.9 at admission. No ECG changes. S/p hyperK cocktail and emergent dialysis 1/20       Palliative care has been consulted to discuss goal of care discussion     VTE:  Contraindicated  Ulcer: PPI  Lines: Central Line  Ongoing indication addressed, Arterial Line  Ongoing indication addressed and Velez Catheter  Ongoing indication addressed    I have performed a physical exam and reviewed and updated ROS and Plan today (1/28/2020). In review of yesterday's note (1/27/2020), there are no changes except as documented above.     Discussed patient condition and risk of morbidity and/or mortality with Family, RN, RT, Pharmacy, Charge nurse / hot rounds and Patient  The patient remains critically ill.  Critical care time = 75 minutes in directly providing and coordinating critical care and extensive data review.  No time overlap and excludes procedures.

## 2020-01-28 NOTE — FLOWSHEET NOTE
01/28/20 1529   Weaning Parameters   RR (bpm) 28   #FVC / Vital Capacity (liters)  0.33  (Pt did seem to try and follow but very weak)   NIF (cm H2O)  -14.5   Rapid Shallow Breathing Index (RR/VT) 107   Spontaneous VE 7.6   Spontaneous

## 2020-01-28 NOTE — THERAPY
Occupational Therapy Contact Note  Attempted to see pt for OT session. Per RN, pt not appropriate at this time. Per RN, pt possibly transitioning to comfort care d/t poor prognosis once family is contacted. Will await POC and GOC.  Shahida JOVEL, OTR/L    Pager #[phone]

## 2020-01-28 NOTE — CARE PLAN
Problem: Skin Integrity  Goal: Risk for impaired skin integrity will decrease  Note:   Skin assessment completed, BMS inserted since rectal tube was leaking, barrier paste applied to bottom and thickly on wounds to protect skin.  Pt is vented and does not have a ortiz catheter due to a national catheter shortage at this time.  Pt is mostly aneuric currently.       Problem: Psychosocial Needs:  Goal: Level of anxiety will decrease  Note:   Patient's fiance at bedside at this time.  Dr. Lazcano spoke with aicha about outcome and plan to possibly transition to comfort care tomorrow pending his decision.  No other family has been able to be contacted at this time.  Patient's fiance upset and crying at bedside at this time.  Emotional support and tissues provided.  Some privacy given by closing curtains partially.

## 2020-01-28 NOTE — CARE PLAN
Problem: Ventilation Defect:  Goal: Ability to achieve and maintain unassisted ventilation or tolerate decreased levels of ventilator support  Outcome: PROGRESSING SLOWER THAN EXPECTED   Adult Ventilation Update    Total Vent Days: 3    Patient Lines/Drains/Airways Status      Active Airway       Name: Placement date: Placement time: Site: Days:    Airway Oral 7.5  01/25/20   1536   Oral  3                  #FVC / Vital Capacity (liters) : 0(not following) (01/27/20 0835)  NIF (cm H2O) : 0 (01/27/20 0835)    Sputum/Suction   Cough: Productive (01/27/20 1025)  Sputum Amount: Small (01/27/20 1200)  Sputum Color: Tan (01/27/20 1200)  Sputum Consistency: Thick (01/27/20 1200)      Events/Summary/Plan: back to cmv after 1 hour on spont- does not follow (01/27/20 0835)

## 2020-01-28 NOTE — PALLIATIVE CARE
"Palliative Care follow-up  Received update that pts friend at the bedside.   Met with Elpidio. Spoke about Dyan and her current situation. Let him know that Lizette has been speaking with us and attempting to provide assistance with finding the pts family. Elpidio acknowledged that the pt was  (not legally) to Lizette for approx 10 yrs and that he met Cherelle approx last August.   Elpidio stated that they have \"been seeing each other, I just wanted to help get her off the street\". Validated Elpidio's grief and feelings for the pt.   Spoke about update provided from Dr Lazcano. Elpidio concerned that we have not been able to contact the pts family and wants them to be able to participate in this conversation. Elpidio stated that he paid for a search engine on the Internet to attempt to find relatives.   Let Elpidio know that Lizette was able to provide the names and contact numbers for the pts brother and father, Regino and Graeme (updated onto the [employer]) and that we have left messages with them but have not heard back at this time. Asked Elpidio if he felt the pt would want our  to come and provide some spiritual support and prayer, Elpidio was unsure at this time.  Provided Elpidio with Palliative Team contact number for any needs.     Received update that Lizette had called in to the office.  Called Lizette back, Lizette upset and tearful at hearing that pt may be taken off life support and wanting to be at the pts bedside. Updated Lizette that pt was critically ill, but at that at this time, we are not taking the pt off life support that I am aware of.  Explained pts condition to Lizette and spoke about poor prognosis. Lizette continues to hope that we will be able to make contact with the pts family.   Lizette stated that she has called the pts father and brother again also and left messages.  Lizette state that she was canceling her MD appt for tomorrow to be at the bedside.    Provided support and let her know we will call with any updates.     Lizette provided " "contact of \"Francesco\" Ismael living in [place], but does not have a phone number.     Updated: BS RN, unable to update MD at this time, will speak with him today. Unit CM RN    Plan: TBD, currently attempting to reach pts family.     Thank you for allowing Palliative Care to support this patient and family. Contact [phone] for additional assistance, change in patient status, or with any questions/concerns.   "

## 2020-01-28 NOTE — PALLIATIVE CARE
Palliative Care follow-up  Received call from Lizette, Lizette stated that she has not heard back from the pts brother or father and was wondering if they had contact [facility] yet, let know that we have not hear from them at this time.  Lizette was tearful, related that her brother and her cat have both unexpectedly within the past week and she is suffering, provided as much support as able. Spoke about her relationship with CHANTELLEmayra and her support for the pt when at the bedside.   Lizette stated that she would be coming in to see her again in between her MD appt the next two days. Lizette also requested that if the pt was to die that we would let her know.   Lizette stated that the pt has another brother, who is younger, and lives in [place] she believes, but she does not have his contact information. Let her know I can update the Unit CM RN tomorrow.     Plan: TBD as the pt progresses, attempting to reach pts father and/or brother Regino and Graeme Guevara.     Thank you for allowing Palliative Care to support this patient and family. Contact [phone] for additional assistance, change in patient status, or with any questions/concerns.

## 2020-01-28 NOTE — CARE PLAN
Problem: Ventilation Defect:  Goal: Ability to achieve and maintain unassisted ventilation or tolerate decreased levels of ventilator support  Outcome: PROGRESSING AS EXPECTED      Ventilator Daily Summary    Vent Day # 4    Ventilator settings changed this shift: Not at this time     Weaning trials: yes     Respiratory Procedures: Not at this time     Plan: Continue current ventilator settings and wean mechanical ventilation as tolerated per physician orders.

## 2020-01-28 NOTE — CARE PLAN
Problem: Safety  Goal: Will remain free from falls  Outcome: PROGRESSING AS EXPECTED  Intervention: Implement fall precautions  Flowsheets  Taken 1/27/2020 2108  Bed Alarm: Yes - Alarm On  Taken 1/27/2020 2000  Environmental Precautions: Report Given to Other Health Care Providers Regarding Fall Risk;Bed in Low Position;Communication Sign for Patients & Families;Mobility Assessed & Appropriate Sign Placed     Problem: Infection  Goal: Will remain free from infection  Outcome: PROGRESSING AS EXPECTED     Problem: Safety - Medical Restraint  Goal: Remains free of injury from restraints (Restraint for Interference with Medical Device)  Description  INTERVENTIONS:  1. Determine that other, less restrictive measures have been tried or would not be effective before applying the restraint  2. Evaluate the patient's condition at the time of restraint application  3. Inform patient/family regarding the reason for restraint  4. Q2H: Monitor safety, psychosocial status, comfort, nutrition and hydration  Outcome: PROGRESSING AS EXPECTED     Problem: Communication  Goal: The ability to communicate needs accurately and effectively will improve  Outcome: PROGRESSING SLOWER THAN EXPECTED  Intervention: Reorient patient to environment as needed  Flowsheets (Taken 1/26/2020 2049)  Oriented to:: All of the Following : Location of Bathroom, Visiting Policy, Unit Routine, Call Light and Bedside Controls, Bedside Rail Policy, Smoking Policy, Rights and Responsibilities, Bedside Report, and Patient Education Notebook     Problem: Bowel/Gastric:  Goal: Normal bowel function is maintained or improved  Outcome: PROGRESSING SLOWER THAN EXPECTED     Problem: Respiratory:  Goal: Respiratory status will improve  Outcome: PROGRESSING SLOWER THAN EXPECTED

## 2020-01-28 NOTE — PROGRESS NOTES
Nephrology Daily Progress Note    Date of Service  1/27/2020     HISTORY OF PRESENT ILLNESS:    53yo female BIB EMS to ED for altered mentation. Patient is unable to provide history 2/2 AMS. History obtained from her significant other (at bedside) and ED staff. Patient stopped consuming EtOH ~2 weeks ago after history of excessive use. She did not taper or utilize BZDs. She has had hematemesis x 1-2 months and dark tarry stools for at least 2 weeks. She also stopped smoking and significant other repeatedly denies use of any illicit substance, new medications, or supplements except protein shakes. She has been taking ~2 ibuprofen tabs daily for 2 weeks. EMS reported patient minimally responsive and moaning upon their arrival.      Patient is hypotensive in ED, SBP 60s-100s, responsive to IVF. Hgb 4 at presentation, provided with IVF. She has been placed on PPI. She is acidotic and has significant hyponatremia and hyperkalemia. She had been provided with NS prior to consultation.      No known h/o renal disease or DM. Possible HTN associated with EtOH use per significant other. SCr 0.82 7/2011     UDS negative, ASA pending    Interval Problem Update  01/20 - consult done, HD provided  01/21 - tolerated HD, Na, K, CO2, BUN improved, poor UOP, alert this AM, she confirms no ingestions, cessation of EtOH use after excessive use, use of ~400mg ibuprofen daily x 2 weeks, as well as hematemesis and melena PTA  01/22 - SCr increased, Na near normal, K wnl, acidosis improved, possible US vs MRI today, had EGD which demonstrated ulcerative esophagitis, small hiatal hernia, portal HTN gastropathy  01/23 - no UOP, +edema  01/24 - high flow NC, 2L off with HD today, 8L positive for hospitalization  01/25 - high flow NC, HD again today, +9.3L for hospitalization  01/26 - required intubation, +10.4L for hospitalization, FLC ratio wnl  01/27 - intubated, opens eyes but otherwise not following commands. Renal bx canceled due  to intubation/critical illness. Palliative team attempting to reach family for GOC discussion. No UOP. Cr/lytes stable, plan for HD tomorrow.   01/28 - Phos low today, <1.0, will replace with tube feed diet, no HD for now. LA 4.2. No significant change in pt condition, palliative trying to reach family/NoK for help with decisions. Fiance at bedside.     Review of Systems  Review of Systems   Unable to perform ROS: Intubated        Physical Exam  Temp:  [36.7 °C (98 °F)-37.2 °C (99 °F)] 37.2 °C (99 °F)  Pulse:  [] 84  Resp:  [16-27] 24  BP: ()/(54-87) 108/69  SpO2:  [97 %-100 %] 99 %    Physical Exam  Constitutional:       General: She is not in acute distress.     Appearance: She is ill-appearing.      Comments: Thin   HENT:      Head: Normocephalic and atraumatic.      Nose: Nose normal.      Mouth/Throat:      Comments: ETT  Eyes:      General: Scleral icterus present.         Right eye: No discharge.         Left eye: No discharge.      Extraocular Movements: Extraocular movements intact.   Cardiovascular:      Rate and Rhythm: Normal rate and regular rhythm.      Heart sounds: No murmur.   Pulmonary:      Breath sounds: Rales present. No wheezing.      Comments: Intubated/ventilated  Abdominal:      General: Abdomen is flat. There is no distension.      Palpations: Abdomen is soft.   Musculoskeletal:      Right lower leg: Edema present.      Left lower leg: Edema present.   Skin:     General: Skin is warm and dry.   Neurological:      Mental Status: She is alert.      Comments: sedated   Psychiatric:      Comments: sedated         Fluids    Intake/Output Summary (Last 24 hours) at 1/28/2020 1510  Last data filed at 1/28/2020 1400  Gross per 24 hour   Intake 2386.41 ml   Output 410 ml   Net 1976.41 ml       Laboratory  Recent Labs     01/26/20  0550 01/27/20  0510 01/28/20  0227   WBC 13.4* 16.2* 19.5*   RBC 2.67* 2.57* 2.62*   HEMOGLOBIN 9.0* 8.7* 8.7*   HEMATOCRIT 27.7* 27.2* 28.0*   .7*  105.8* 106.9*   MCH 33.7* 33.9* 33.2*   MCHC 32.5* 32.0* 31.1*   RDW 94.6* 96.9* 99.7*   PLATELETCT 94* 95* 99*   MPV 12.3 13.3* 13.6*     Recent Labs     01/27/20  0510 01/28/20  0227 01/28/20  0350   SODIUM 136 139 136   POTASSIUM 3.6 3.6 3.5*   CHLORIDE 98 100 99   CO2 27 25 24   GLUCOSE 154* 185* 172*   BUN 22 46* 47*   CREATININE 1.57* 2.22* 2.39*   CALCIUM 8.3* 8.3* 8.0*         No results for input(s): NTPROBNP in the last 72 hours.            Imaging     Renal US per radiology read (reviewed):   IMPRESSION:     1.  No hydronephrosis or renal abnormality.  2.  Bladder is decompressed by Velez catheter.    Assessment/Plan     GENESIS: Etiology likely prolonged prerenal state, may be small contributing factor of NSAIDs use. UA reviewed, concerning for possible infection, no casts (except 0-2 hyaline) or crystals noted. Will repeat if able. Renal US without abnormality. PCR and ACR pending. ANCA negative, SPEP pending, VARGHESE negative, FLC ratio wnl, mostly likely ATN. Complements wnl. Renal biopsy canceled due to intubation/critical illness.   -Concentrate IV meds as able.   - HD as needed, holding for now  -On pressor support.   -Preferable to keep MAP>60, SBP>110.   -Continue midodrine.   -Daily labs and weights, renally dose meds, strict I/O, no NSAIDs, no nephrotoxins. ASA results not found, UDS negative. Hep panel reviewed.      Hyperkalemia: resolved, correct with HD as needed    Hypokalemia: stable, corrected with HD     Acidosis: improved, though increased LA 1/20 to 2.3, HD on 1/21     Septic and hemorrhagic shock: per ICU     upper GIB: Hgb improved, on PPI     Hyponatremia: improved    Edema: +10.4L, UF with HD as tolerated, plan for HD/UF today and tomorrow for volume    Hypohosphatemia: will avoid k-phos due to ARF, replete with tube feed diet for now, continue to monitor    Hypocalcemia: improved, 3Ca bath with HD as needed, vitamin D low and PTH elevated, started ergocalciferol    Resp failure: now  intubated, vent per pulm, concentrate IVF, UF with HD     Probable chronic liver disease and associated coagulopathy: note INR elevated, albumin low, ammonia elevated. Platelets wnl. AST elevated, ALT wnl. No documented h/o cirrhosis      Other management per primary service       PMH/PSH/FH/SH reviewed and unchanged  Medications and labs reviewed

## 2020-01-28 NOTE — PALLIATIVE CARE
Spiritual Care Note    Patient Information     Patient's Name: Dyan Guevara   MRN: [id number]    YOB: 1965   Age and Gender: 54 y.o. female   Unit: [facility]   Room (and Bed): S136   Ethnicity or Nationality:    Primary Language: English   Pentecostalism/Spiritual Preference: none   Place of Res[place]   Friends Present: Elpidio mulligan   Medical Diagnoses: acute respiratory failure with hypoxia  alcoholic hepatitis without ascites  septic shock   acute kidney injury  coagulopathy   hemorrhagic shock  upper GI bleed  lactic acidosis  metabolic acidosis  alcohol abuse  increased ammonia level  seizure   hyperkalemia   Code Status: Full Code    Date of Admission: 1/20/2020   Length of Stay: 8 days        Spiritual Care Provider Information  Title of Spiritual Care Provider:     Name of Spiritual Care Provider:   PENELOPE Espinoza  Phone Number:     [phone]   E-Mail:       [email or web address]  Total Time:      15 minutes      Spiritual Screen Results:  Is your spiritual health or inner well-being important to you as you cope with your medical condition?   Unable to determine  Would you like to receive a visit from our Spiritual Care team or your own Jew or spiritual leader?   No      Encounter/Request Information  Visited With:      Elpidio mulligan  Nature of the Visit:     Initial, On Shift  Continue Visiting:     Upon Request  Crisis Visit:      Critical Care  Referral From/Origin of Request:   Management Rounds      Religous Needs/Values  Pentecostalism Needs:     Prayer      Spiritual Assessment   Observations/Symptoms:    Patient obtunded, on vent.  Friend at bedside, tearful.  Interaction/Conversation:    Patient's friend talked about patient's medical condition,         expressed his sadness and grief,         talked about efforts being made to reach next of kin,         asked for prayer.  Assessment:      Need  Need:       Seeking Spiritual Assistance and  Support  Interventions:      Compassionate Presence, Reflective Listening, Emotional Support, Prayer  Outcomes:      Coping, Emotional Release, Spiritual Comfort  Plan:       Visit Upon Request

## 2020-01-28 NOTE — PROGRESS NOTES
"Critical Care Progress Note    Date of admission  1/20/2020    Chief Complaint  AMS, hematemesis    Hospital Course    54 y.o. female with hx of alcohol abuse, hx of recent pancreatitis, upper GI bleed 2 months ago, who presented 1/20/2020 with altered mental status and abd pain. Pt was actively drinking until 2 weeks ago where she quit \"cold turkey\" per sundeep. History obtained from  and medical record. Since then, pt has been having N/V and in the past few days, pt developed progressive weakness and AMS. Alejandrachandu reported small amount of blood in her emesis as well as in her stools. Pt was brought to ED. At ED, pt was hypotensive with BP in 60/40s. Also reported ED noted melanotic stools on the sheets and some blood around her mouth. Pt received 3L crystalloid fluid boluses. Lactate 13, creatinine 13, K 6.9. HCO3 7. Ceftriaxone started. Blood cultures obtained. INR 2.0, plt 215K     HyperK cocktail was given. Nephrology was consulted for emergent dialysis. Hgb 4.0, received 2U uncrossmatched PRBCs with 1U plat and 1U FFP. Per ED, pt's mental status is somewhat improved with fluids, but pt still moaning while I got there. Pt c/o epigastric pain. On 3L NC with sat >90%. SBP in 100s after fluid boluses.    1/20 s/p emergent dialysis  1/22 s/p EGD: ulcerative esophagitis, mild portal HTN gastropathy, small hiatus hernia.   1/25 intubated due to hypoxic resp failure from bilateral pulm edema       Interval Problem Update  Reviewed last 24 hour events:  Patient remains on mechanical ventilator day 9  No dialysis today per renal anticipate dialysis tomorrow  Patient remains poorly responsive on Precedex not following commands  Remains heavily jaundiced with elevated bilirubin and coagulopathic  Remains anuric  Afebrile  Remains on pressors  Continue progressive multiorgan failure, grim prognosis for survival  Appreciate input from palliative care, no family me available to assist with decision-making although " prognosis is poor in any event and transition to comfort care soon seems inevitable given patient's very poor status/poor prognosis    Prior 24 hours  Remains critically ill  Remains on mechanical ventilation   FiO2 40%, PEEP 8  Sedated with precedex 0.7  Remains on norepinephrine at 5mcg and vasopressin 0.03 to keep SBP >90  Afebrile, Tm 37.6C  HR in 60s, -120s.   Had dialysis yesterday with 2.5L fluid out.   MAP >60, HR in 100s  Hgb stable in 8.6.   WBC 13K  No urine output    Review of Systems  Review of Systems   Unable to perform ROS: Intubated (limited due to mental status)        Vital Signs for last 24 hours   Temp:  [36.1 °C (97 °F)-37 °C (98.6 °F)] 37 °C (98.6 °F)  Pulse:  [59-85] 77  Resp:  [15-31] 22  BP: ()/(52-77) 87/57  SpO2:  [96 %-100 %] 100 %    Hemodynamic parameters for last 24 hours       Respiratory Information for the last 24 hours  Malcolm Vent Mode: APVCMV  Rate (breaths/min): 20  Vt Target (mL): 350  PEEP/CPAP: 8  FiO2: 30  P Support: 5  P MEAN: 12  Control VTE (exp VT): 341    Physical Exam   Physical Exam  Vitals signs (Chronically ill, wasted heavily jaundiced) and nursing note reviewed.   Constitutional:       General: She is not in acute distress.     Appearance: She is ill-appearing and toxic-appearing. She is not diaphoretic.   HENT:      Head: Normocephalic and atraumatic.      Mouth/Throat:      Mouth: Mucous membranes are dry.      Comments: ET tube in place  Eyes:      General: Scleral icterus present.      Pupils: Pupils are equal, round, and reactive to light.   Neck:      Musculoskeletal: Neck supple.   Cardiovascular:      Rate and Rhythm: Normal rate and regular rhythm.      Pulses: Normal pulses.      Heart sounds: Normal heart sounds. No murmur.   Pulmonary:      Effort: No respiratory distress.      Breath sounds: Normal breath sounds. No wheezing, rhonchi or rales.      Comments: Diminished at bases  Minimal crackles  Abdominal:      General: Abdomen is  flat. There is no distension.      Palpations: Abdomen is soft.      Tenderness: There is no tenderness. There is no guarding.   Musculoskeletal:      Right lower leg: No edema.      Left lower leg: No edema.   Skin:     General: Skin is dry.      Coloration: Skin is jaundiced.      Findings: No bruising, erythema or rash.   Neurological:      Comments: Poorly responsive minimal withdrawal         Medications  Current Facility-Administered Medications   Medication Dose Route Frequency Provider Last Rate Last Dose   • thiamine tablet 100 mg  100 mg Enteral Tube DAILY Kermit Lazcano M.D.   100 mg at 01/27/20 1507   • Pharmacy Consult: Enteral tube insertion - review meds/change route/product selection  1 Each Other PHARMACY TO DOSE OPAL Gonzales.O.       • midodrine (PROAMATINE) tablet 15 mg  15 mg Enteral Tube Q8HRS Keith Montenegro D.O.   15 mg at 01/27/20 1448   • prednisoLONE (PRELONE) 15 MG/5ML syrup 39.9 mg  39.9 mg Enteral Tube DAILY OPAL Gonzales.O.   39.9 mg at 01/27/20 0505   • lactulose 20 GM/30ML solution 30 mL  30 mL Enteral Tube TID Keith Montenegro D.O.   Stopped at 01/27/20 1751   • riFAXIMin (XIFAXAN) tablet 550 mg  550 mg Enteral Tube BID Keith Montenegro D.O.   550 mg at 01/27/20 1750   • vitamin D (Ergocalciferol) (DRISDOL) capsule 50,000 Units  50,000 Units Oral Q7 DAYS OPAL Schwartz.O.   Stopped at 01/25/20 0930   • fentaNYL (SUBLIMAZE) injection 50 mcg  50 mcg Intravenous Q HOUR PRN OPAL Gonzales.O.   50 mcg at 01/26/20 1225   • dexmedetomidine (PRECEDEX) 400 mcg/100mL NS premix infusion  0.1-1.5 mcg/kg/hr Intravenous Continuous OPAL Gonzales.O. 13.9 mL/hr at 01/27/20 1938 0.8 mcg/kg/hr at 01/27/20 1938   • Respiratory Care per Protocol   Nebulization Continuous RT OPAL Gonzales.O.       • ipratropium-albuterol (DUONEB) nebulizer solution  3 mL Nebulization Q2HRS PRN (RT) Keith Montenegro D.O.       • lidocaine (XYLOCAINE) 1 % injection 1-2 mL  1-2 mL Tracheal Tube Q30 MIN PRN Keith Montenegro D.O.       •  norepinephrine (LEVOPHED) 16 mg in  mL Infusion (NOTE CONCENTRATION)  0-30 mcg/min Intravenous Continuous OPAL Gonzales.OTucker 2.3 mL/hr at 01/27/20 0755 2.5 mcg/min at 01/27/20 0755   • LORazepam (ATIVAN) injection 2-4 mg  2-4 mg Intravenous Q HOUR PRN OPAL Gonzales.O.       • pantoprazole (PROTONIX) injection 40 mg  40 mg Intravenous BID Keith Montenegro D.O.   40 mg at 01/27/20 1752   • Pharmacy Consult Request - to monitor for nephrotoxic agents  1 Each Other PHARMACY TO DOSE Anthony Pardo D.O.       • lactated ringers infusion (BOLUS): BMI less than or equal to 30  30 mL/kg Intravenous Once PRN Anthony Pardo D.O.       • vasopressin (VASOSTRICT) 20 Units in  mL Infusion  0.03 Units/min Intravenous Continuous REILLY GonzalesOTucker 9 mL/hr at 01/27/20 1107 0.03 Units/min at 01/27/20 1107   • heparin injection 2,400 Units  2,400 Units Intracatheter DIALYSIS PRN REILLY SchwartzOTucker   2,400 Units at 01/26/20 1716       Fluids    Intake/Output Summary (Last 24 hours) at 1/27/2020 2008  Last data filed at 1/27/2020 1800  Gross per 24 hour   Intake 1671.17 ml   Output 400 ml   Net 1271.17 ml       Laboratory  Recent Labs     01/25/20  1749 01/26/20  0448 01/27/20  0546   ISTATAPH 7.446 7.503* 7.515*   ISTATAPCO2 44.8* 34.0 22.0*   ISTATAPO2 317* 111* 73   ISTATATCO2 32 28 18*   YKIHJCU0CMP 100* 99 96   ISTATARTHCO3 30.9* 26.7* 17.8   ISTATARTBE 6* 4* -5*   ISTATTEMP 36.6 C 37.6 C 97.0 F   ISTATFIO2 100 50 30   ISTATSPEC Arterial Arterial Arterial   ISTATAPHTC 7.452 7.494 7.529*   [id number] 315* 115* 69         Recent Labs     01/25/20  0537 01/26/20  0550 01/27/20  0510   SODIUM 137 133* 136   POTASSIUM 3.8 3.5* 3.6   CHLORIDE 99 96 98   CO2 23 24 27   BUN 30* 20 22   CREATININE 2.48* 1.90* 1.57*   MAGNESIUM  --   --  1.9   PHOSPHORUS 3.7  --  1.0*   CALCIUM 7.8* 8.3* 8.3*     Recent Labs     01/25/20  0537 01/26/20  0550 01/27/20  0510   ALTSGPT 40 43 49   ASTSGOT 147* 153* 159*   ALKPHOSPHAT 195* 220* 262*    TBILIRUBIN 14.4* 15.9* 17.5*   PREALBUMIN  --   --  6.0*   GLUCOSE 123* 105* 154*     Recent Labs     01/25/20  0537 01/26/20  0550 01/27/20  0510   WBC 15.4* 13.4* 16.2*   NEUTSPOLYS 94.80* 86.90* 93.00*   LYMPHOCYTES 3.50* 6.10* 3.50*   MONOCYTES 1.70 2.60 2.60   EOSINOPHILS 0.00 0.00 0.90   BASOPHILS 0.00 0.00 0.00   ASTSGOT 147* 153* 159*   ALTSGPT 40 43 49   ALKPHOSPHAT 195* 220* 262*   TBILIRUBIN 14.4* 15.9* 17.5*     Recent Labs     01/25/20  0537 01/26/20  0550 01/27/20  0510   RBC 2.64* 2.67* 2.57*   HEMOGLOBIN 8.6* 9.0* 8.7*   HEMATOCRIT 27.7* 27.7* 27.2*   PLATELETCT 94* 94* 95*       Imaging  X-Ray:  I have personally reviewed the images and compared with prior images.    Assessment/Plan  * Acute respiratory failure with hypoxia (HCC)  Assessment & Plan  Patient is vent dependent largely at this point because of poor mental status.  We can you can you evaluate spontaneous breathing trials off sedation a daily basis.      Alcoholic hepatitis without ascites  Assessment & Plan  Patient appears to have end-stage liver disease of thrombocytopenia hyperbilirubinemia coagulopathic parathyroid renal and severe encephalopathy.  Continue supportive care but anticipate that patient will likely die on this hospital stay.      Septic shock (HCC)  Assessment & Plan  Patient ryan on pressors.  This is could be due to sepsis but also due to end-stage liver disease where people classically have very low blood pressures patient.  Patient ryan on pressors as well as Midrin.  Will likely will need to tolerate a systolic in the 80s and see if she has organ perfusion with that.    GENESIS (acute kidney injury) (HCC)  Assessment & Plan  Etiology of acute renal failure not entirely clear at this point.  Given the degree of liver failure hepatorenal seems very likely.  In any event patient has multiorgan failure and dialysis is not likely improving her chances of long-term survival given the other severe organ failures.   Continue dialysis anticipate tomorrow follow electrolytes closely    Coagulopathy (HCC)  Assessment & Plan  Liver related  TEG with prolonged R, MA ok  INR 2.0  Plt in 200s, fibrinogen in 500s  Completed vitamin K 10mg IV daily x 3 days 1/20-1/22    Coagulopathy likely secondary to burnout liver with limited ability to make coagulation factors      Hemorrhagic shock (HCC)  Assessment & Plan  Due to Upper GI bleed, in the setting of active alcohol abuse. - resolved. Bleeding seems to have stopped.   S/p 4U PRBCs, 1U FFP on 1/20  No further blood transfusions  Transfuse to keep Hgb >7    Resolved      Upper GI bleed  Assessment & Plan  Evidence of hematemesis and melena at admission  Pt's Hgb 4.0 at admission   S/p 4U PRBCs and 2U FFPs  EGD 1/22 by GI and noted ulcerative esophagitis. No esophageal varices  Continue protonix 40 BID  Ionized calcium is still low and will replete calcium chloride.   Advance diet as tolerated  Repeat iron studies in 3 days.     No evidence of ongoing bleeding today    Lactic acidosis  Assessment & Plan  High lactate at 13 due to septic shock, hemorrhagic shock, in the setting of alcoholic abuse  Resolved      Metabolic acidosis  Assessment & Plan  Acidosis resolving with dialysis continue to monitor    Alcohol abuse  Assessment & Plan  Patient is off all sedation except Precedex which is used to control for agitation.  Continue to reassess.  Also will continue lactulose to treat for what is presumed to be hepatic encephalopathy, add rifaximin    Increased ammonia level  Assessment & Plan  Hepatic encephalopathy clinically as noted treat with lactulose and rifaximin    Seizure (HCC)  Assessment & Plan  One episode of possible seizure on the evening on 1/20. No further episodes noted on 1/21 until present  Pt alert, oriented.   Ativan 2-4 mg IV [id number] prn seizures only     Hyperkalemia  Assessment & Plan  Resolved. Life threatening hyperkalemia 6.9 at admission. No ECG changes. S/p hyperK  cocktail and emergent dialysis 1/20       Palliative care has been consulted to discuss goal of care discussion     VTE:  Contraindicated  Ulcer: PPI  Lines: Central Line  Ongoing indication addressed, Arterial Line  Ongoing indication addressed and Velez Catheter  Ongoing indication addressed    I have performed a physical exam and reviewed and updated ROS and Plan today (1/27/2020). In review of yesterday's note (1/26/2020), there are no changes except as documented above.     Discussed patient condition and risk of morbidity and/or mortality with Family, RN, RT, Pharmacy, Charge nurse / hot rounds and Patient  The patient remains critically ill.  Critical care time = 80 minutes in directly providing and coordinating critical care and extensive data review.  No time overlap and excludes procedures.

## 2020-01-28 NOTE — PROGRESS NOTES
Dr. Lazcano at bedside talking to patient's boyfriend/ fiance about patient's prognosis and plan.

## 2020-01-28 NOTE — CARE PLAN
Problem: Nutritional:  Goal: Achieve adequate nutritional intake  Description  Patient will consume 50% of meals   Outcome: NOT MET  Pt is currently intubated, on tube feedings.     Problem: Nutritional:  Goal: Nutrition support tolerated and meeting greater than 85% of estimated needs  Outcome: MET  Tube feeding at goal: Diabetisource AC @ 55 mL/hr.

## 2020-01-29 NOTE — PROGRESS NOTES
Spoke to patient's father Regino on the phone.  He said that taking the tube out tomorrow sounds like a good plan to him.  If she is unable to make decisions for herself, he does not wish for her to be re intubated.  He wishes for her to be comfortable over continuing aggressive medical care.  This RN told him that one of the doctors or Palliative care RNs may be reaching out to confirm this decision.

## 2020-01-29 NOTE — WOUND TEAM
[facility]  Inpatient Services  Initial Wound and Skin Care Evaluation    Admission Date: 1/20/2020     Consult Date:1/26/20 1514   [facility], PMH, SH: Reviewed    Unit where seen by Wound Team: [id number]     WOUND CONSULT RELATED TO:  Buttocks + sacrococcygeal     Self Report / Pain Level:  No s/s of distress       OBJECTIVE:  On SELINA bed, heels floated off pillows,     WOUND TYPE, LOCATION, CHARACTERISTICS (Pressure Injuries: location, stage, POA or date identified)    Wound 01/20/20 Partial Thickness Wound Buttocks left (Active)      1/28/2020  5:00 PM   Site Assessment Red;Fragile;Light purple    Carla-wound Assessment Red    Margins Defined edges    Wound Length (cm) 1.5 cm 1/28/2020  5:00 PM   Wound Width (cm) 1.5 cm 1/28/2020  5:00 PM   Wound Depth (cm) 0.1 cm 1/28/2020  5:00 PM   Wound Surface Area (cm^2) 2.25 cm^2 1/28/2020  5:00 PM   Tunneling 0 cm 1/28/2020  5:00 PM   Undermining 0 cm 1/28/2020  5:00 PM   Closure Open to air    Drainage Amount None    Drainage Description Sanguineous    Non-staged Wound Description Partial thickness    Treatments Cleansed    Cleansing Normal Saline Irrigation    Periwound Protectant Barrier Paste    Dressing Options Open to Air    NEXT Weekly Photo (Inpatient Only) 02/05/20    Odor None     Exposed Structures None     Tissue Type and Percentage 100% red           Vascular:  Wound not r/t vascular issue    Lab Values:    Lab Results   Component Value Date/Time    WBC 19.5 (H) 01/28/2020 02:27 AM    RBC 2.62 (L) 01/28/2020 02:27 AM    HEMOGLOBIN 8.7 (L) 01/28/2020 02:27 AM    HEMATOCRIT 28.0 (L) 01/28/2020 02:27 AM        Results from last 7 days   Lab Units 01/28/20  0227 01/27/20  0510   C REACTIVE PROTEIN 4596 mg/dL 11.84* 16.67*             No results found for: HBA1C        Culture:  na    INTERVENTIONS BY WOUND TEAM:  Pt turned to R side, assessed buttocks and sacrococcygeal area. Barrier paste applied. Pt repositioned. Assessed R ear, left Alix, RN has waffle  cushion as pillow.     Interdisciplinary consultation: Patient, RN     EVALUATION: Pt has a circular partial thickness wound to L buttock, not over bony prominence. There was a small partial thickness wound to coccyx that is resolved.  There is purplish discolored sacrococcygeal area 9 x 12 cm, slow to marifer, may be pt's normal since it includes skin inside gluteal cleft. R ear with small red/purplish area that is blanching. It is a concern that it can break down. Waffle cushion being used as a pillow.     Goals: Steady decrease in wound area and depth weekly.    NURSING PLAN OF CARE ORDERS (X):  Dressing changes: See Dressing Care orders:   Skin care: See Skin Care orders: x  Rectal tube care: See Rectal Tube Care orders:        Other orders:                           RSKIN:   CURRENTLY IN PLACE (X), APPLIED THIS VISIT (A), ORDERED (O):   Q shift Vamsi:  X  Q shift pressure point assessments:  X  Pressure redistribution mattress                             Low Airloss   x                     Bariatric SELINA                     Bariatric foam                        Heel float boots                     Heel Silicone dressing                         Float Heels off Bed with Pillows   x            Barrier wipes   x      Barrier Cream         Barrier paste    x      Sacral silicone dressing         Silicone O2 tubing         Anchorfast         Cannula fixation Device (Tender )          Gray Foam Ear protectors           Trach with Optifoam split foam                 Waffle cushion   As pillow     Waffle Overlay         Rectal tube or BMS  x  Purwick/Condom Cath          Antifungal tx      Interdry          Reposition q 2 hours   x     Up to chair        Ambulate      PT/OT        Dietician  x      Diabetes Education      PO     TF  x   TPN     NPO   # days   Other        WOUND TEAM PLAN OF CARE (X):   Dressing changes by wound team:          Follow up 1-2 times weekly:               Follow up 3 times weekly:                 NPWT change 3 times weekly:     Follow up as needed:   X Nsg to notify if wound worsens    Other (explain):     Anticipated discharge plans (X):   LTACH:        SNF/Rehab:                  Home Care:           Outpatient Wound Center:            Self Care:            Other:   Unsure of needs @ this time

## 2020-01-29 NOTE — CARE PLAN
Ventilator Daily Summary    Vent Day # 5    Ventilator settings changed this shift: none    Weaning trials: sbt as tolerated    Respiratory Procedures: none at this time    Plan: Continue current ventilator settings and wean mechanical ventilation as tolerated per physician orders.

## 2020-01-29 NOTE — DISCHARGE PLANNING
Patient is eligible for Medicaid Meds to Beds at discharge if they have coverage with [place] Medicaid, Medicaid FFS, Medicaid HMO ([facility]), or [place]. This service is provided through the [facility] if orders are received prior to 4 p.m. Monday through Friday, excluding holidays. Please call [phone] prior to discharge.

## 2020-01-29 NOTE — FLOWSHEET NOTE
01/29/20 1210   Weaning Parameters   RR (bpm) 19   #FVC / Vital Capacity (liters)  0.69   NIF (cm H2O)  -5   Rapid Shallow Breathing Index (RR/VT) 56   Spontaneous VE 5.6   Spontaneous    We did the FVC maneuver first and she said she was tired for the NIF.

## 2020-01-29 NOTE — PROGRESS NOTES
Nephrology Daily Progress Note    Date of Service  1/27/2020     HISTORY OF PRESENT ILLNESS:    53yo female BIB EMS to ED for altered mentation. Patient is unable to provide history 2/2 AMS. History obtained from her significant other (at bedside) and ED staff. Patient stopped consuming EtOH ~2 weeks ago after history of excessive use. She did not taper or utilize BZDs. She has had hematemesis x 1-2 months and dark tarry stools for at least 2 weeks. She also stopped smoking and significant other repeatedly denies use of any illicit substance, new medications, or supplements except protein shakes. She has been taking ~2 ibuprofen tabs daily for 2 weeks. EMS reported patient minimally responsive and moaning upon their arrival.      Patient is hypotensive in ED, SBP 60s-100s, responsive to IVF. Hgb 4 at presentation, provided with IVF. She has been placed on PPI. She is acidotic and has significant hyponatremia and hyperkalemia. She had been provided with NS prior to consultation.      No known h/o renal disease or DM. Possible HTN associated with EtOH use per significant other. SCr 0.82 7/2011         Interval Problem Update  01/20 - consult done, HD provided  01/21 - tolerated HD, Na, K, CO2, BUN improved, poor UOP, alert this AM, she confirms no ingestions, cessation of EtOH use after excessive use, use of ~400mg ibuprofen daily x 2 weeks, as well as hematemesis and melena PTA  01/22 - SCr increased, Na near normal, K wnl, acidosis improved, possible US vs MRI today, had EGD which demonstrated ulcerative esophagitis, small hiatal hernia, portal HTN gastropathy  01/23 - no UOP, +edema  01/24 - high flow NC, 2L off with HD today, 8L positive for hospitalization  01/25 - high flow NC, HD again today, +9.3L for hospitalization  01/26 - required intubation, +10.4L for hospitalization, FLC ratio wnl  01/27 - intubated, opens eyes but otherwise not following commands. Renal bx canceled due to intubation/critical  illness. Palliative team attempting to reach family for GOC discussion. No UOP. Cr/lytes stable, plan for HD tomorrow.   01/28 - Phos low today, <1.0, will replace with tube feed diet, no HD for now. LA 4.2. No significant change in pt condition, palliative trying to reach family/NoK for help with decisions. Fiance at bedside.   01/29 - No HD today. Plan to extubate, possible that patient will go to comfort care.    Review of Systems  Review of Systems   Unable to perform ROS: Intubated        Physical Exam  Temp:  [36.6 °C (97.9 °F)-37.2 °C (99 °F)] 36.6 °C (97.9 °F)  Pulse:  [82-99] 99  Resp:  [11-35] 12  BP: ()/(49-77) 119/76  SpO2:  [96 %-100 %] 100 %    Physical Exam  Constitutional:       General: She is not in acute distress.     Appearance: She is ill-appearing.      Comments: Thin   HENT:      Head: Normocephalic and atraumatic.      Nose: Nose normal.      Mouth/Throat:      Comments: ETT  Eyes:      General: Scleral icterus present.         Right eye: No discharge.         Left eye: No discharge.      Extraocular Movements: Extraocular movements intact.   Cardiovascular:      Rate and Rhythm: Normal rate and regular rhythm.      Heart sounds: No murmur.   Pulmonary:      Breath sounds: Rales present. No wheezing.      Comments: Intubated/ventilated  Abdominal:      General: Abdomen is flat. There is no distension.      Palpations: Abdomen is soft.   Musculoskeletal:      Right lower leg: Edema present.      Left lower leg: Edema present.   Skin:     General: Skin is warm and dry.   Neurological:      Mental Status: She is alert.      Comments: sedated   Psychiatric:      Comments: sedated         Fluids    Intake/Output Summary (Last 24 hours) at 1/29/2020 1157  Last data filed at 1/29/2020 0801  Gross per 24 hour   Intake 1460.94 ml   Output 900 ml   Net 560.94 ml       Laboratory  Recent Labs     01/27/20  0510 01/28/20  0227 01/29/20  0515   WBC 16.2* 19.5* 19.0*   RBC 2.57* 2.62* 2.55*    HEMOGLOBIN 8.7* 8.7* 8.5*   HEMATOCRIT 27.2* 28.0* 27.2*   .8* 106.9* 106.7*   MCH 33.9* 33.2* 33.3*   MCHC 32.0* 31.1* 31.3*   RDW 96.9* 99.7* 98.4*   PLATELETCT 95* 99* 86*   MPV 13.3* 13.6* 13.2*     Recent Labs     01/28/20  0227 01/28/20  0350 01/29/20  0515   SODIUM 139 136 142   POTASSIUM 3.6 3.5* 3.2*   CHLORIDE 100 99 101   CO2 25 24 26   GLUCOSE 185* 172* 134*   BUN 46* 47* 69*   CREATININE 2.22* 2.39* 3.00*   CALCIUM 8.3* 8.0* 7.8*         No results for input(s): NTPROBNP in the last 72 hours.            Imaging     Renal US per radiology read (reviewed):   IMPRESSION:     1.  No hydronephrosis or renal abnormality.  2.  Bladder is decompressed by Velez catheter.    Assessment/Plan     GENESIS: Etiology likely prolonged prerenal state, may be small contributing factor of NSAIDs use. UA reviewed, concerning for possible infection, no casts (except 0-2 hyaline) or crystals noted. Will repeat if able. Renal US without abnormality. PCR and ACR pending. ANCA negative, SPEP pending, VARGHESE negative, FLC ratio wnl, mostly likely ATN. Complements wnl. Renal biopsy canceled due to intubation/critical illness.   -Concentrate IV meds as able.   - HD as needed, holding for now, if moves to comfort care will not have more HD  -On pressor support.   -Preferable to keep MAP>60, SBP>110.   -Continue midodrine.   -Daily labs and weights, renally dose meds, strict I/O, no NSAIDs, no nephrotoxins. ASA results not found, UDS negative. Hep panel reviewed.      Hyperkalemia: resolved, correct with HD as needed    Hypokalemia: stable, corrected with HD     Acidosis: improved, though increased LA 1/20 to 2.3, HD on 1/21     Septic and hemorrhagic shock: per ICU     upper GIB: Hgb improved, on PPI     Hyponatremia: improved    Edema: +10.4L, UF with HD as tolerated, plan for HD/UF today and tomorrow for volume    Hypohosphatemia: will avoid k-phos due to ARF, replete with tube feed diet for now, continue to  monitor    Hypocalcemia: improved, 3Ca bath with HD as needed, vitamin D low and PTH elevated, started ergocalciferol    Resp failure: now intubated, vent per pulm, concentrate IVF, UF with HD     Probable chronic liver disease and associated coagulopathy: note INR elevated, albumin low, ammonia elevated. Platelets wnl. AST elevated, ALT wnl. No documented h/o cirrhosis      Other management per primary service       PMH/PSH/FH/SH reviewed and unchanged  Medications and labs reviewed

## 2020-01-29 NOTE — PROGRESS NOTES
Critical CARE progress note    I discussed medical condition and goals of care in detail post with the patient's significant other which described himself as her fiancé and patient's brother mother and siblings who live in [place].  This discussion was held by phone with the [place] part of the family.  They feel strongly that patient would not want nursing home dependent high-level dependency.  They request a primary focus on comfort.  They agree with a trial of extubation and no reintubation and DNR status and once extubated DNR/DNI.  They feel hospice is likely to be appropriate.  The plan is extubation tomorrow and likely shift goals of care to comfort measures only depending on clinical status post extubation and if patient is able to persuade in discussions.  On exam today she is more awake and lucid and following simple commands with her obviously profoundly weak.  Both the fiancé and the family were fully in full agreement as to the goals and direction of care.  Palliative care was present and agree with the assessment and plan

## 2020-01-29 NOTE — FLOWSHEET NOTE
01/29/20 0434   Weaning Parameters   RR (bpm) 22   #FVC / Vital Capacity (liters)  0.5   NIF (cm H2O)  -14   Rapid Shallow Breathing Index (RR/VT) 76   Spontaneous VE 5   Spontaneous    Weaning Trial   Weaning Trial Stopped due to: Pt weaned for 1 hour and returned to rest settings per protocol   Length of Weaning Trial (Hours) 1

## 2020-01-29 NOTE — PALLIATIVE CARE
Palliative Care follow-up  Spoke with family Mehran updated on pts clinical status, poor prognosis. Family in agreement for DNR/I ok at this time with extubation planned for tomorrow and status then to be updated to DNR/DNI.   If pt declines, family wishes for pt to made CC, if pt clears then she can participate in a discussion on POC/GOC.   Spoke at length with pts father, Regino and his wife Emili, a retired RN. Provided more clinical updates. Spoke about family history of ETOH.   Regino stated they are unable to fly out and be present and none of the pts siblings are able to afford to come out either. Supported Regino as able, Regino happy that Lizette and Elpidio are able to be at the bedside and provide emotional support. Let Regino know that the [facility]  had also been able to meet with Elpidio and the pt and provide spiritual support.     Verified plan for DNR/I ok, then extubate tomorrow. Discussed if his daughter declines to transition to CC and if she clears mentally she may be able to participate in decisions. Regino agrees with plan, but would also like to be kept updated on how his daughter does prior to move to CC if needed.     Regino requested mortuary list, obtained e-mail for Emili and sent over Mortuary list.   [email or web address]    Updated: CHARANJIT Laurent RN    Plan: Extubate tomorrow, 1/30. If declines transition to CC, if pt clears she can participate in POC conversation.    Thank you for allowing Palliative Care to support this patient and family. Contact [phone] for additional assistance, change in patient status, or with any questions/concerns.

## 2020-01-29 NOTE — PROGRESS NOTES
Dr. Lazcano notified regarding pt's episodes of emesis.  Emesis is tube feed colored and there is no evidence of aspiration.  ETT remains clear.  PRN Zofran ordered.

## 2020-01-29 NOTE — PALLIATIVE CARE
Palliative Care follow-up  Received message from Lizette this morning, she was able to get in touch, via Facebook, with the pts friend from High School in [place]. That friend was to alert the family that they needed to responded to the messages from the Hospital.   Pts father, Regino, has reached out and spoken with the pts bedside RN and stated to her that extubation to CC was appropriate.     Messaged Dr Lazcano    Plan: Will call and speak with pts father Regino after discussion with MD.     Thank you for allowing Palliative Care to support this patient and family. Contact [phone] for additional assistance, change in patient status, or with any questions/concerns.

## 2020-01-29 NOTE — THERAPY
SPEECH THERAPY CONTACT NOTE:     This SLP attempted to see patient for dysphagia tx session. Patient intubated and on ventilator support and not appropriate for tx session. SLP will hold tx session and await new orders when extubated and appropriate. Thank you.

## 2020-01-29 NOTE — CARE PLAN
Problem: Safety  Goal: Will remain free from falls  Outcome: PROGRESSING AS EXPECTED  Intervention: Implement fall precautions  Flowsheets  Taken 1/29/2020 0226  Bed Alarm: Yes - Alarm On  Taken 1/29/2020 0200  Environmental Precautions: Report Given to Other Health Care Providers Regarding Fall Risk;Bed in Low Position;Communication Sign for Patients & Families;Mobility Assessed & Appropriate Sign Placed     Problem: Infection  Goal: Will remain free from infection  Outcome: PROGRESSING AS EXPECTED     Problem: Safety - Medical Restraint  Goal: Remains free of injury from restraints (Restraint for Interference with Medical Device)  Description  INTERVENTIONS:  1. Determine that other, less restrictive measures have been tried or would not be effective before applying the restraint  2. Evaluate the patient's condition at the time of restraint application  3. Inform patient/family regarding the reason for restraint  4. Q2H: Monitor safety, psychosocial status, comfort, nutrition and hydration  Outcome: PROGRESSING AS EXPECTED     Problem: Communication  Goal: The ability to communicate needs accurately and effectively will improve  Outcome: PROGRESSING SLOWER THAN EXPECTED  Intervention: Educate patient and significant other/support system about the plan of care, procedures, treatments, medications and allow for questions  Flowsheets (Taken 1/29/2020 0226)  Pt & Family Have Been Educated on Methods Available to Report Concerns Related to Care, Treatment, Services, and Patient Safety Issues: Yes

## 2020-01-30 NOTE — THERAPY
Attempted therapy follow up session. Per chart review pt is currently intubated. Plan for potential extubation tomorrow that will determine plan of care. Will follow up as appropriate.

## 2020-01-30 NOTE — FLOWSHEET NOTE
01/30/20 0504   Weaning Parameters   RR (bpm) 22   #FVC / Vital Capacity (liters)  0.8   NIF (cm H2O)  -14   Rapid Shallow Breathing Index (RR/VT) 82   Spontaneous VE 6.1   Spontaneous    Weaning Trial   Weaning Trial Stopped due to: Pt weaned for 1 hour and returned to rest settings per protocol   Length of Weaning Trial (Hours) 1

## 2020-01-30 NOTE — PROGRESS NOTES
Nephrology Daily Progress Note    Date of Service  1/27/2020     HISTORY OF PRESENT ILLNESS:    53yo female BIB EMS to ED for altered mentation. Patient is unable to provide history 2/2 AMS. History obtained from her significant other (at bedside) and ED staff. Patient stopped consuming EtOH ~2 weeks ago after history of excessive use. She did not taper or utilize BZDs. She has had hematemesis x 1-2 months and dark tarry stools for at least 2 weeks. She also stopped smoking and significant other repeatedly denies use of any illicit substance, new medications, or supplements except protein shakes. She has been taking ~2 ibuprofen tabs daily for 2 weeks. EMS reported patient minimally responsive and moaning upon their arrival.      Patient is hypotensive in ED, SBP 60s-100s, responsive to IVF. Hgb 4 at presentation, provided with IVF. She has been placed on PPI. She is acidotic and has significant hyponatremia and hyperkalemia. She had been provided with NS prior to consultation.      No known h/o renal disease or DM. Possible HTN associated with EtOH use per significant other. SCr 0.82 7/2011         Interval Problem Update  01/20 - consult done, HD provided  01/21 - tolerated HD, Na, K, CO2, BUN improved, poor UOP, alert this AM, she confirms no ingestions, cessation of EtOH use after excessive use, use of ~400mg ibuprofen daily x 2 weeks, as well as hematemesis and melena PTA  01/22 - SCr increased, Na near normal, K wnl, acidosis improved, possible US vs MRI today, had EGD which demonstrated ulcerative esophagitis, small hiatal hernia, portal HTN gastropathy  01/23 - no UOP, +edema  01/24 - high flow NC, 2L off with HD today, 8L positive for hospitalization  01/25 - high flow NC, HD again today, +9.3L for hospitalization  01/26 - required intubation, +10.4L for hospitalization, FLC ratio wnl  01/27 - intubated, opens eyes but otherwise not following commands. Renal bx canceled due to intubation/critical  illness. Palliative team attempting to reach family for GOC discussion. No UOP. Cr/lytes stable, plan for HD tomorrow.   01/28 - Phos low today, <1.0, will replace with tube feed diet, no HD for now. LA 4.2. No significant change in pt condition, palliative trying to reach family/NoK for help with decisions. Fiance at bedside.   01/29 - No HD today. Plan to extubate, possible that patient will go to comfort care.  01/30 - Extubated. Confused and appears weak. Has transitioned to comfort care at this time, will hold off on HD unless patient decides otherwise    Review of Systems  Review of Systems   Unable to perform ROS: Intubated        Physical Exam  Temp:  [36.2 °C (97.1 °F)-37.2 °C (99 °F)] 36.6 °C (97.9 °F)  Pulse:  [] 80  Resp:  [12-28] 20  BP: ()/(46-89) 108/68  SpO2:  [91 %-100 %] 100 %    Physical Exam  Constitutional:       General: She is not in acute distress.     Appearance: She is ill-appearing.      Comments: Thin   HENT:      Head: Normocephalic and atraumatic.      Nose: Nose normal.      Mouth/Throat:      Comments: ETT  Eyes:      General: Scleral icterus present.         Right eye: No discharge.         Left eye: No discharge.      Extraocular Movements: Extraocular movements intact.   Cardiovascular:      Rate and Rhythm: Normal rate and regular rhythm.      Heart sounds: No murmur.   Pulmonary:      Breath sounds: Rales present. No wheezing.      Comments: Intubated/ventilated  Abdominal:      General: Abdomen is flat. There is no distension.      Palpations: Abdomen is soft.   Musculoskeletal:      Right lower leg: Edema present.      Left lower leg: Edema present.   Skin:     General: Skin is warm and dry.   Neurological:      Mental Status: She is alert.      Comments: sedated   Psychiatric:      Comments: sedated         Fluids    Intake/Output Summary (Last 24 hours) at 1/30/2020 1128  Last data filed at 1/30/2020 1000  Gross per 24 hour   Intake 1476.73 ml   Output 1250 ml    Net 226.73 ml       Laboratory  Recent Labs     01/28/20  0227 01/29/20  0515 01/30/20  0455   WBC 19.5* 19.0* 17.8*   RBC 2.62* 2.55* 2.58*   HEMOGLOBIN 8.7* 8.5* 8.6*   HEMATOCRIT 28.0* 27.2* 26.9*   .9* 106.7* 104.3*   MCH 33.2* 33.3* 33.3*   MCHC 31.1* 31.3* 32.0*   RDW 99.7* 98.4* 95.4*   PLATELETCT 99* 86* 101*   MPV 13.6* 13.2* 13.7*     Recent Labs     01/28/20  0350 01/29/20  0515 01/30/20  0455   SODIUM 136 142 144   POTASSIUM 3.5* 3.2* 3.0*   CHLORIDE 99 101 102   CO2 24 26 24   GLUCOSE 172* 134* 160*   BUN 47* 69* 82*   CREATININE 2.39* 3.00* 3.33*   CALCIUM 8.0* 7.8* 7.4*         No results for input(s): NTPROBNP in the last 72 hours.            Imaging     Renal US per radiology read (reviewed):   IMPRESSION:     1.  No hydronephrosis or renal abnormality.  2.  Bladder is decompressed by Velez catheter.    Assessment/Plan     GENESIS: Etiology likely prolonged prerenal state, may be small contributing factor of NSAIDs use. UA reviewed, concerning for possible infection, no casts (except 0-2 hyaline) or crystals noted. Will repeat if able. Renal US without abnormality. PCR and ACR pending. ANCA negative, SPEP pending, VARGHESE negative, FLC ratio wnl, mostly likely ATN. Complements wnl. Renal biopsy canceled due to intubation/critical illness.  - Has transitioned to comfort care at this time, will hold off on HD unless patient decides otherwise  -Concentrate IV meds as able.   -On pressor support.   -Preferable to keep MAP>60, SBP>110.   -Continue midodrine.   -Daily labs and weights, renally dose meds, strict I/O, no NSAIDs, no nephrotoxins. ASA results not found, UDS negative. Hep panel reviewed.      Hyperkalemia: resolved, correct with HD as needed    Hypokalemia: stable, corrected with HD     Acidosis: improved, though increased LA 1/20 to 2.3, HD on 1/21     Septic and hemorrhagic shock: per ICU     upper GIB: Hgb improved, on PPI     Hyponatremia: improved    Edema: Improved, though holding HD at  this time for comfort care    Hypohosphatemia: will avoid k-phos due to ARF, replete with tube feed diet for now, continue to monitor    Hypocalcemia: improved, 3Ca bath with HD as needed, holding now for comfort care, vitamin D low and PTH elevated, started ergocalciferol    Resp failure: extubated, comfort measures at this time     Probable chronic liver disease and associated coagulopathy: note INR elevated, albumin low, ammonia elevated. Platelets wnl. AST elevated, ALT wnl. No documented h/o cirrhosis      Other management per primary service       PMH/PSH/FH/SH reviewed and unchanged  Medications and labs reviewed

## 2020-01-30 NOTE — PALLIATIVE CARE
"Palliative Care follow-up  Call from Emili Guevara, step-mom of Dyan, who has been helping Regino with mortuary preferences. She states she spoke with \"Chica at the hospital\" and got all the information for setting up next steps. She states she and Regino have chosen [facility] for Dyan once she passes. She denied any other needs/questions at this time.    PC visited BS and received updates from RN noting pt extubated and responding appropriately. PC and BS RN visited with Annabellelori and Elpidio at BS. She is aware that she is sick and understands comfort is the focus, and appears this her goal as well. Pt has pressors in place and okay with continuing these until she is no longer coherent, at which time they will be stopped as well. Pt and SO at bedside aware. Hospice order discussed with Dr. Lazcano; will reach out to family to discuss further.    Updated: BS RN    Plan: on CC    Thank you for allowing Palliative Care to support this patient and family. Contact [phone] for additional assistance, change in patient status, or with any questions/concerns.   "

## 2020-01-30 NOTE — THERAPY
Occupational Therapy Contact Note  Pt has transitioned to comfort care. Will d/c from OT, but please re-consult if change in POC.   Shahida Mitchell OTD, OTR/L    Pager #[phone]

## 2020-01-30 NOTE — PROGRESS NOTES
Dr. Lazcano came bedside to speak with patient, verbal orders obtained from Dr. Lazcano to discontinue pressor support and oxygen.

## 2020-01-30 NOTE — PROGRESS NOTES
Patient extubated and voice has been rested for a little while.  Patient is lethargic but can answer orientation questions appropriately.  Patient understands that she is comfort care and agrees with the plan but does wish for her vasopressors and oxygen to remain on until she is is no longer alert.  when she is no longer alert, vasopressors and oxygen may be removed per wishes of the patient.  Boyfriend at bedside and is in agreement with plan.  Patient has another family friend that would like to come by and visit with patient today if possible.    Dr. Lazcano updated on patients wishes.

## 2020-01-30 NOTE — CARE PLAN
Ventilator Daily Summary    Vent Day # 6    Ventilator settings changed this shift: none    Weaning trials: sbt as tolerated    Respiratory Procedures: none at this time    Plan: Continue current ventilator settings and wean mechanical ventilation as tolerated per physician orders.

## 2020-01-30 NOTE — RESPIRATORY CARE
Extubation    Cuff leak noted yes  Stridor presen  no    Patient toleration yes  RCP Complete? yes    Events/Summary/Plan: extubated per MD placed on NC 2 lpm spo2 97% (01/30/20 5823)

## 2020-01-30 NOTE — PROGRESS NOTES
ICU attending    Pt extubated, now DNR/ DNI. Very weak, poor cough, confused. Pt at the end-of-life. As discussed with family will change goals to comfort measures. Hospice requested. No further dialysis. Death is likely imminent in next few days.

## 2020-01-30 NOTE — CARE PLAN
Problem: Skin Integrity  Goal: Risk for impaired skin integrity will decrease  Intervention: Implement precautions to protect skin integrity in collaboration with the interdisciplinary team  Note:   Pt seen by the wound team.  Intervention in place.  Pt turned q2 hrs cleaned as needed.  Barrier cream in place, waffle cushion under head     Problem: Psychosocial Needs:  Goal: Level of anxiety will decrease  Intervention: Identify and develop with patient strategies to cope with anxiety triggers  Note:   Educated pt on plan for the shift.  Time for rest given. Emotional support provided.

## 2020-01-31 NOTE — DIETARY
Nutrition Services:  Patient is comfort care status; per nursing Cortrak has been removed.  RD available PRN.

## 2020-01-31 NOTE — PROGRESS NOTES
[facility] Medicine Daily Progress Note    Date of Service  1/30/2020    Chief Complaint  54 y.o. female admitted 1/20/2020 with confusion and weakness.    Hospital Course    Ms. Guevara has a history of significant alcohol abuse as well as prior pancreatitis and prior upper GI bleeds that was brought to the emergency room because of altered mental status and she was becoming weak.  She is found to have a hemoglobin of 4 and a blood pressure 60/40.  She had an lactic acidosis was admitted to call condition to the intensive care unit with acute renal failure.  He required intubation and initiation of dialysis.      Interval Problem Update  1/30/20: Patient seen and evaluated the intensive care unit.  She is now comfort care measures.  Her son is at bedside.  Patient's really only complaint is that her throat hurts.  She does have some thrush on her tongue but it does not seem to bother her.    Consultants/Specialty  Local care  GI  Nephrology    Code Status  Comfort care  Hospice consultation  Morphine for pain Ativan for anxiety  Disposition  Medical    Review of Systems  Review of Systems   Constitutional: Negative for fever.   Respiratory: Positive for cough.    Gastrointestinal:        Ulcerating some liquid   All other systems reviewed and are negative.       Physical Exam  Temp:  [36.2 °C (97.1 °F)-37.1 °C (98.8 °F)] 36.6 °C (97.9 °F)  Pulse:  [] 79  Resp:  [12-28] 20  BP: ()/(46-89) 103/67  SpO2:  [91 %-100 %] 100 %    Physical Exam  Vitals signs and nursing note reviewed.   Constitutional:       Comments: Thin chronically ill-appearing   HENT:      Head:      Comments: Temporal wasting     Mouth/Throat:      Mouth: Mucous membranes are dry.      Comments: Thrush on her tongue  Eyes:      General: Scleral icterus present.      Conjunctiva/sclera: Conjunctivae normal.   Neck:      Musculoskeletal: Neck supple.      Comments: Right sided internal jugular vein dialysis catheter  Cardiovascular:       Rate and Rhythm: Normal rate and regular rhythm.      Heart sounds: No murmur.   Pulmonary:      Comments: Poor air movement, coarse tracheal breath sound  Abdominal:      General: There is distension.      Tenderness: There is no tenderness.   Musculoskeletal:      Right lower leg: No edema.      Left lower leg: No edema.   Skin:     Coloration: Skin is jaundiced.   Neurological:      Mental Status: She is oriented to person, place, and time.   Psychiatric:      Comments: Flat affect         Fluids    Intake/Output Summary (Last 24 hours) at 1/30/2020 1620  Last data filed at 1/30/2020 1000  Gross per 24 hour   Intake 971.88 ml   Output 1250 ml   Net -278.12 ml       Laboratory  Recent Labs     01/28/20  0227 01/29/20  0515 01/30/20  0455   WBC 19.5* 19.0* 17.8*   RBC 2.62* 2.55* 2.58*   HEMOGLOBIN 8.7* 8.5* 8.6*   HEMATOCRIT 28.0* 27.2* 26.9*   .9* 106.7* 104.3*   MCH 33.2* 33.3* 33.3*   MCHC 31.1* 31.3* 32.0*   RDW 99.7* 98.4* 95.4*   PLATELETCT 99* 86* 101*   MPV 13.6* 13.2* 13.7*     Recent Labs     01/28/20  0350 01/29/20  0515 01/30/20  0455   SODIUM 136 142 144   POTASSIUM 3.5* 3.2* 3.0*   CHLORIDE 99 101 102   CO2 24 26 24   GLUCOSE 172* 134* 160*   BUN 47* 69* 82*   CREATININE 2.39* 3.00* 3.33*   CALCIUM 8.0* 7.8* 7.4*                   Imaging  DX-CHEST-PORTABLE (1 VIEW)   Final Result      Stable chest with interstitial opacity most consistent with edema      DX-CHEST-PORTABLE (1 VIEW)   Final Result      Stable examination.      DX-CHEST-PORTABLE (1 VIEW)   Final Result      Stable cardiomegaly and interstitial infiltrates.      DX-CHEST-PORTABLE (1 VIEW)   Final Result         1. No significant interval change.      DX-CHEST-PORTABLE (1 VIEW)   Final Result         1. Interval placement of feeding tube. No significant interval change.      DX-ABDOMEN FOR TUBE PLACEMENT   Final Result      Feeding tube tip in the gastric body.      DX-CHEST-LIMITED (1 VIEW)   Final Result      1.  ETT terminates  1 cm above the lynda.   2.  Stable slight increase in bilateral pulmonary opacities.      DX-CHEST-LIMITED (1 VIEW)   Final Result      Stable diffuse bilateral pulmonary infiltrates.      DX-CHEST-LIMITED (1 VIEW)   Final Result      1.  Increased patchy bilateral pulmonary airspace opacities suspicious for multifocal pneumonitis.   2.  Stable enlargement of the cardiomediastinal silhouette.   3.  Small right pleural effusion.      JD-[id number]-G/O   Final Result      1. No focal hepatic lesion is seen. No abscess is seen. No area of restricted diffusion.      The finding on CT is most likely heterogeneous parenchyma and fatty infiltration.      2. Distended gallbladder with layering sludge.      3. Diffuse anasarca. Trace perihepatic ascites. Small right pleural effusion.      US-LIVER AND VESSELS COMPLETE (COMBO)   Final Result      1.  Ill-defined hypodense hepatic lesions identified on recent CT are not visualized on ultrasound presumably secondary to markedly increased hepatic echogenicity secondary to hepatic steatosis and/or diffuse hepatocellular disease. Consider MRI for    further evaluation if indicated.   2.  Hepatomegaly.   3.  Mild dilation of the main portal vein with hepatopedal flow.   4.  Bladder debris. Nonspecific finding which can be seen in setting infection, inflammation or hemorrhage.   5.  Left pleural effusion.      CT-ABDOMEN-PELVIS W/O   Final Result      1.  Ill-defined hypodense hepatic lesions, incompletely characterized on this noncontrast study. In the setting of infection they may represent abscesses. Recommend further evaluation with contrast-enhanced CT or MRI.   2.  Small bilateral pleural effusions, right greater than left, and small volume ascites.   3.  Hyperdense material in the bladder lumen. It may represent small amount of hemorrhage related to catheter instrumentation, although urothelial bladder lesion is not excluded.   4.  Unchanged uterine fibroid.      US-RENAL    Final Result      1.  No hydronephrosis or renal abnormality.   2.  Bladder is decompressed by Velez catheter.      DX-CHEST-FOR LINE PLACEMENT Perform procedure in: Patient's Room   Final Result      1.  Satisfactory positioning of new right IJV multilumen dialysis catheter.      2.  No pneumothorax identified.      CT-HEAD W/O   Final Result      1.  Cerebral atrophy.      2.  Otherwise, Head CT without contrast within normal limits. No evidence of acute cerebral infarction, hemorrhage or mass lesion.      DX-CHEST-PORTABLE (1 VIEW)   Final Result      No acute cardiac or pulmonary abnormalities are identified.      Right IJ central line in place with tip at the expected location of the SVC/right atrial junction.           Assessment/Plan  * Acute respiratory failure with hypoxia (HCC)  Assessment & Plan  Status post intubation  She has been extubated to a DNR/DNI status    Septic shock (HCC)  Assessment & Plan  This is Severe Sepsis Present on admission  SIRS criteria identified on my evaluation include: Tachycardia, with heart rate greater than 90 BPM and Leukocyosis, with WBC greater than 12,000  Source of infection is unclear at this point time check a urine analysis.  May be component of anemia.  Possible bacteremia.  Clinical indicators of end organ dysfunction include Creatinine >2.0 (Without ESRD or CKD)     Sepsis protocol initiated  Fluid resuscitation ordered per protocol  IV antibiotics as appropriate for source of sepsis    End organ dysfunction include(s):  Acute kidney failure and Hepatic failure      GENESIS (acute kidney injury) (HCC)  Assessment & Plan  Unknown baseline but consistent with hepatorenal syndrome given her concomitant liver failure  She is status post dialysis    Coagulopathy (HCC)  Assessment & Plan  1/20/2020 INR 2.07  Likely component of chronic liver disease  FFP and vitamin K were given    Hemorrhagic shock (HCC)  Assessment & Plan  Canary to upper GI bleed for which she was  transfused    Upper GI bleed  Assessment & Plan  Due to esophagitis noted on EGD    Lactic acidosis  Assessment & Plan  Likely secondary to shock      Metabolic acidosis  Assessment & Plan  Treated with fluid    Alcohol abuse  Assessment & Plan  Treated with rally bag with vitamins  s    Elevated troponin  Assessment & Plan  Follow serial troponins  Monitor on telemetry    Hyperkalemia  Assessment & Plan  Status post emergent dialysis       VTE prophylaxis: comfort care

## 2020-01-31 NOTE — CARE PLAN
Problem: Safety  Goal: Will remain free from injury  Note:   Hourly rounding in effect, pt instructed to call for assistance, bed locked and in lowest position. Bed alarm on. Family at bedside.        Problem: Knowledge Deficit  Goal: Knowledge of disease process/condition, treatment plan, diagnostic tests, and medications will improve  Note:   Pt updated and educated on nursing interventions, medications and POC

## 2020-01-31 NOTE — PROGRESS NOTES
Pt is A&Ox4, comfort care, and does not get out of bed. Bed alarm is on and in the low and locked position. Call light within reach, wearing non-slip socks, and rounded on hourly. q2hour turns in place. Patient is soft-spoken but can communicate needs when allowed adequate time. BMS in place.

## 2020-01-31 NOTE — PROGRESS NOTES
"Pt extremely fatigued and weak. VS: BP (!) 76/50   Pulse 82   Temp 36.1 °C (97 °F) (Temporal)   Resp 18   Ht 1.676 m (5' 6\")   Wt 69.3 kg (152 lb 12.5 oz)   SpO2 (!) 87%   BMI 24.66 kg/m² . Pt denies pain, n/v, numbness, tingling, SOB and chest pain. PIV ad dialysis catheter patent with positive blood return. Rectal tube in place. Family at bedside. Pt needs met at this time, call light within reach, hourly rounding in effect, and will continue to monitor.       "

## 2020-01-31 NOTE — PROGRESS NOTES
RN skin check done with ANANYA Espitia.    Patient has a jaundice tone to her skin.  Ears are slightly red.   Elbows are bruised and red but blanching.   Heels are dry, boggy, and blanching.   Sacrum is red and fragile with a open, red wound approximately 1 inch in diameter on the left side.    Prevention:  Barrier paste applied to sacrum.  Heels are floated.  Waffle mattress in place.  Q2hour turns in place.

## 2020-01-31 NOTE — PROGRESS NOTES
Report received from ANANYA Naranjo at 2030. Patient arrived to unit at 2130 with all belongings. Bed alarm on, call light within reach and all needs met at this time.

## 2020-01-31 NOTE — PROGRESS NOTES
Report called to CNBONNIE Benjamin for pt transport  No questions at time of transfer  Pt belongings with patient and transport.  ITA Magallanes called and informed of pt being transferred this evening.

## 2020-01-31 NOTE — RESPIRATORY CARE
COPD EDUCATION by COPD CLINICAL EDUCATOR  1/31/2020 at 8:07 AM by Deysi Mcmullen, RRT     Patient reviewed by COPD education team. Patient does not have a history or diagnosis of COPD, therefore does not qualify for the COPD program.

## 2020-01-31 NOTE — CARE PLAN
Problem: Safety  Goal: Will remain free from falls  Outcome: PROGRESSING AS EXPECTED  Note:   Call light within reach, bed in the low and locked position, patient wearing non-slip socks, and rounded on hourly.       Problem: Pain Management  Goal: Pain level will decrease to patient's comfort goal  Outcome: PROGRESSING AS EXPECTED  Note:   Patient educated on and offered pain management interventions.   Patient reports no pain.

## 2020-01-31 NOTE — PROGRESS NOTES
Nephrology Daily Progress Note    Date of Service  1/27/2020     HISTORY OF PRESENT ILLNESS:    53yo female BIB EMS to ED for altered mentation. Patient is unable to provide history 2/2 AMS. History obtained from her significant other (at bedside) and ED staff. Patient stopped consuming EtOH ~2 weeks ago after history of excessive use. She did not taper or utilize BZDs. She has had hematemesis x 1-2 months and dark tarry stools for at least 2 weeks. She also stopped smoking and significant other repeatedly denies use of any illicit substance, new medications, or supplements except protein shakes. She has been taking ~2 ibuprofen tabs daily for 2 weeks. EMS reported patient minimally responsive and moaning upon their arrival.      Patient is hypotensive in ED, SBP 60s-100s, responsive to IVF. Hgb 4 at presentation, provided with IVF. She has been placed on PPI. She is acidotic and has significant hyponatremia and hyperkalemia. She had been provided with NS prior to consultation.      No known h/o renal disease or DM. Possible HTN associated with EtOH use per significant other. SCr 0.82 7/2011         Interval Problem Update  01/20 - consult done, HD provided  01/21 - tolerated HD, Na, K, CO2, BUN improved, poor UOP, alert this AM, she confirms no ingestions, cessation of EtOH use after excessive use, use of ~400mg ibuprofen daily x 2 weeks, as well as hematemesis and melena PTA  01/22 - SCr increased, Na near normal, K wnl, acidosis improved, possible US vs MRI today, had EGD which demonstrated ulcerative esophagitis, small hiatal hernia, portal HTN gastropathy  01/23 - no UOP, +edema  01/24 - high flow NC, 2L off with HD today, 8L positive for hospitalization  01/25 - high flow NC, HD again today, +9.3L for hospitalization  01/26 - required intubation, +10.4L for hospitalization, FLC ratio wnl  01/27 - intubated, opens eyes but otherwise not following commands. Renal bx canceled due to intubation/critical  illness. Palliative team attempting to reach family for GOC discussion. No UOP. Cr/lytes stable, plan for HD tomorrow.   01/28 - Phos low today, <1.0, will replace with tube feed diet, no HD for now. LA 4.2. No significant change in pt condition, palliative trying to reach family/NoK for help with decisions. Fiance at bedside.   01/29 - No HD today. Plan to extubate, possible that patient will go to comfort care.  01/30 - Extubated. Confused and appears weak. Has transitioned to comfort care at this time, will hold off on HD unless patient decides otherwise  01/31 - Out of ICU. Sitting up in bed, talking, has been able to eat a bit. Has been on comfort care. Discussed dialysis with patient, she will forgo dialysis at this time which is reasonable, understands if she changes her mind could offer 7-10 days of dialysis but if no overall improvement after that period then would discontinue HD. No AM labs, on comfort care. No Velez, unclear of UOP, has had urge to urinate but not sure if any output.     Review of Systems  Review of Systems   Constitutional: Negative for chills and fever.   Respiratory: Negative for shortness of breath.    Cardiovascular: Negative for chest pain.   Gastrointestinal: Negative for abdominal pain, nausea and vomiting.   Genitourinary: Negative for dysuria.   Skin: Negative for itching and rash.        Physical Exam  Temp:  [36 °C (96.8 °F)-36.1 °C (97 °F)] 36.1 °C (97 °F)  Pulse:  [68-84] 82  Resp:  [11-26] 18  BP: ()/(50-73) 76/50  SpO2:  [87 %-100 %] 87 %    Physical Exam  Constitutional:       General: She is not in acute distress.     Appearance: She is ill-appearing.      Comments: Thin   HENT:      Head: Normocephalic and atraumatic.      Nose: Nose normal.      Mouth/Throat:      Comments: ETT  Eyes:      General: Scleral icterus present.         Right eye: No discharge.         Left eye: No discharge.      Extraocular Movements: Extraocular movements intact.   Cardiovascular:       Rate and Rhythm: Normal rate and regular rhythm.      Heart sounds: No murmur.   Pulmonary:      Breath sounds: Rales present. No wheezing.      Comments: Intubated/ventilated  Abdominal:      General: Abdomen is flat. There is no distension.      Palpations: Abdomen is soft.   Musculoskeletal:      Right lower leg: Edema present.      Left lower leg: Edema present.   Skin:     General: Skin is warm and dry.   Neurological:      Mental Status: She is alert.      Comments: sedated   Psychiatric:      Comments: sedated         Fluids    Intake/Output Summary (Last 24 hours) at 1/31/2020 1109  Last data filed at 1/30/2020 1600  Gross per 24 hour   Intake 112.1 ml   Output --   Net 112.1 ml       Laboratory  Recent Labs     01/29/20  0515 01/30/20  0455   WBC 19.0* 17.8*   RBC 2.55* 2.58*   HEMOGLOBIN 8.5* 8.6*   HEMATOCRIT 27.2* 26.9*   .7* 104.3*   MCH 33.3* 33.3*   MCHC 31.3* 32.0*   RDW 98.4* 95.4*   PLATELETCT 86* 101*   MPV 13.2* 13.7*     Recent Labs     01/29/20  0515 01/30/20  0455   SODIUM 142 144   POTASSIUM 3.2* 3.0*   CHLORIDE 101 102   CO2 26 24   GLUCOSE 134* 160*   BUN 69* 82*   CREATININE 3.00* 3.33*   CALCIUM 7.8* 7.4*         No results for input(s): NTPROBNP in the last 72 hours.            Imaging     Renal US per radiology read (reviewed):   IMPRESSION:     1.  No hydronephrosis or renal abnormality.  2.  Bladder is decompressed by Velez catheter.    Assessment/Plan     GENESIS: Etiology likely prolonged prerenal state, may be small contributing factor of NSAIDs use. UA reviewed, concerning for possible infection, no casts (except 0-2 hyaline) or crystals noted. Will repeat if able. Renal US without abnormality. PCR and ACR pending. ANCA negative, SPEP pending, VARGHESE negative, FLC ratio wnl, mostly likely ATN. Complements wnl. Renal biopsy canceled due to intubation/critical illness.  - Has transitioned to comfort care at this time, discussed dialysis with patient, she will forgo dialysis at  this time which is reasonable, understands if she changes her mind could offer 7-10 days of dialysis but if no overall improvement after that period then would discontinue HD. HD would likely not improve her comfort or overall prognosis, agree with patient decision.    -Concentrate IV meds as able.   -Renally dose meds, strict I/O, no NSAIDs, no nephrotoxins. ASA results not found, UDS negative. Hep panel reviewed.      Hyperkalemia: resolved    Hypokalemia: stable, corrected with HD     Acidosis: improved, though increased LA 1/20 to 2.3, HD on 1/21     Septic and hemorrhagic shock: per ICU     upper GIB: Hgb improved, on PPI     Hyponatremia: improved    Edema: Improved, though holding HD at this time for comfort care    Hypohosphatemia: will avoid k-phos due to ARF, replete with tube feed diet for now.     Hypocalcemia: improved, 3Ca bath with HD as needed, holding now for comfort care, vitamin D low and PTH elevated, started ergocalciferol    Resp failure: extubated, comfort measures at this time     Probable chronic liver disease and associated coagulopathy: note INR elevated, albumin low, ammonia elevated. Platelets wnl. AST elevated, ALT wnl. No documented h/o cirrhosis      Other management per primary service       PMH/PSH/FH/SH reviewed and unchanged  Medications and labs reviewed

## 2020-01-31 NOTE — PROGRESS NOTES
[facility] Medicine Daily Progress Note    Date of Service  1/31/2020    Chief Complaint  54 y.o. female admitted 1/20/2020 with altered level of consciousness    Hospital Course    Patient is apparently a 54-year-old female who was drinking excessive amounts of vodka.  She was drinking at least 1/5-1/2 of vodka per day.  The patient significant other found her on the floor and brought her into the hospital.  The patient was having black melanotic stools and then later on turned to reddish stools.  The patient was found to have severe esophagitis on EGD.  She was placed on proton pump inhibitor.  She initially was in respiratory failure and thus had to be intubated and urgently mechanically ventilated.  Since then the patient has been made comfort care.  She is extubated and she is discharged to the medical floor.  At this point the patient is on comfort care measures.      Interval Problem Update  Continue with comfort care measures  Continue with pain management  Continue with anxiety management.  Continue with oxygen for comfort.    Consultants/Specialty  None    Code Status  DNR/DNI/comfort care    Disposition  To be determined    Review of Systems  Review of Systems   Constitutional: Positive for malaise/fatigue and weight loss. Negative for chills, diaphoresis and fever.   HENT: Negative.    Eyes: Negative.  Negative for double vision.   Respiratory: Negative.  Negative for cough, hemoptysis and wheezing.    Cardiovascular: Negative.  Negative for chest pain, palpitations and leg swelling.   Gastrointestinal: Positive for abdominal pain, diarrhea and heartburn. Negative for blood in stool, constipation, nausea and vomiting.   Genitourinary: Negative.  Negative for frequency, hematuria and urgency.   Musculoskeletal: Negative.  Negative for joint pain.   Skin: Negative.  Negative for itching and rash.   Neurological: Positive for headaches. Negative for dizziness, focal weakness, seizures and loss of  consciousness.   Endo/Heme/Allergies: Negative.  Does not bruise/bleed easily.   Psychiatric/Behavioral: Positive for substance abuse. Negative for depression and suicidal ideas. The patient is not nervous/anxious.    All other systems reviewed and are negative.       Physical Exam  Temp:  [36 °C (96.8 °F)-36.1 °C (97 °F)] 36.1 °C (97 °F)  Pulse:  [76-84] 82  Resp:  [11-26] 18  BP: ()/(50-67) 76/50  SpO2:  [87 %] 87 %    Physical Exam  Vitals signs and nursing note reviewed. Exam conducted with a chaperone present.   Constitutional:       Appearance: She is well-developed.   HENT:      Head: Normocephalic and atraumatic.      Right Ear: External ear normal.      Left Ear: External ear normal.      Nose: Nose normal.   Eyes:      Conjunctiva/sclera: Conjunctivae normal.      Pupils: Pupils are equal, round, and reactive to light.   Neck:      Musculoskeletal: Normal range of motion and neck supple.      Thyroid: No thyromegaly.      Vascular: No JVD.   Cardiovascular:      Rate and Rhythm: Tachycardia present. Rhythm irregular.   Pulmonary:      Effort: Prolonged expiration present.      Breath sounds: Decreased air movement present. Examination of the right-lower field reveals decreased breath sounds. Examination of the left-lower field reveals decreased breath sounds. Decreased breath sounds present.   Chest:      Chest wall: No tenderness.   Abdominal:      General: Bowel sounds are normal. There is no distension.      Palpations: Abdomen is soft. There is no mass.      Tenderness: There is no tenderness. There is no guarding or rebound.   Genitourinary:     Comments: Velez catheter  Musculoskeletal: Normal range of motion.         General: No tenderness or signs of injury.      Right lower leg: No edema.      Left lower leg: No edema.   Lymphadenopathy:      Cervical: No cervical adenopathy.   Skin:     General: Skin is warm and dry.      Capillary Refill: Capillary refill takes 2 to 3 seconds.       Coloration: Skin is jaundiced.      Findings: Bruising present. No erythema or rash.   Neurological:      General: No focal deficit present.      Mental Status: She is alert and oriented to person, place, and time.      Cranial Nerves: No cranial nerve deficit.      Deep Tendon Reflexes: Reflexes are normal and symmetric.   Psychiatric:         Attention and Perception: She is inattentive.         Mood and Affect: Affect is flat.         Speech: Speech is delayed.         Behavior: Behavior is slowed.         Fluids    Intake/Output Summary (Last 24 hours) at 1/31/2020 1451  Last data filed at 1/30/2020 1600  Gross per 24 hour   Intake 112.1 ml   Output --   Net 112.1 ml       Laboratory  Recent Labs     01/29/20  0515 01/30/20  0455   WBC 19.0* 17.8*   RBC 2.55* 2.58*   HEMOGLOBIN 8.5* 8.6*   HEMATOCRIT 27.2* 26.9*   .7* 104.3*   MCH 33.3* 33.3*   MCHC 31.3* 32.0*   RDW 98.4* 95.4*   PLATELETCT 86* 101*   MPV 13.2* 13.7*     Recent Labs     01/29/20  0515 01/30/20  0455   SODIUM 142 144   POTASSIUM 3.2* 3.0*   CHLORIDE 101 102   CO2 26 24   GLUCOSE 134* 160*   BUN 69* 82*   CREATININE 3.00* 3.33*   CALCIUM 7.8* 7.4*                   Imaging  DX-CHEST-PORTABLE (1 VIEW)   Final Result      Stable chest with interstitial opacity most consistent with edema      DX-CHEST-PORTABLE (1 VIEW)   Final Result      Stable examination.      DX-CHEST-PORTABLE (1 VIEW)   Final Result      Stable cardiomegaly and interstitial infiltrates.      DX-CHEST-PORTABLE (1 VIEW)   Final Result         1. No significant interval change.      DX-CHEST-PORTABLE (1 VIEW)   Final Result         1. Interval placement of feeding tube. No significant interval change.      DX-ABDOMEN FOR TUBE PLACEMENT   Final Result      Feeding tube tip in the gastric body.      DX-CHEST-LIMITED (1 VIEW)   Final Result      1.  ETT terminates 1 cm above the lynda.   2.  Stable slight increase in bilateral pulmonary opacities.      DX-CHEST-LIMITED (1  VIEW)   Final Result      Stable diffuse bilateral pulmonary infiltrates.      DX-CHEST-LIMITED (1 VIEW)   Final Result      1.  Increased patchy bilateral pulmonary airspace opacities suspicious for multifocal pneumonitis.   2.  Stable enlargement of the cardiomediastinal silhouette.   3.  Small right pleural effusion.      LT-BLLUNDI-U/O   Final Result      1. No focal hepatic lesion is seen. No abscess is seen. No area of restricted diffusion.      The finding on CT is most likely heterogeneous parenchyma and fatty infiltration.      2. Distended gallbladder with layering sludge.      3. Diffuse anasarca. Trace perihepatic ascites. Small right pleural effusion.      US-LIVER AND VESSELS COMPLETE (COMBO)   Final Result      1.  Ill-defined hypodense hepatic lesions identified on recent CT are not visualized on ultrasound presumably secondary to markedly increased hepatic echogenicity secondary to hepatic steatosis and/or diffuse hepatocellular disease. Consider MRI for    further evaluation if indicated.   2.  Hepatomegaly.   3.  Mild dilation of the main portal vein with hepatopedal flow.   4.  Bladder debris. Nonspecific finding which can be seen in setting infection, inflammation or hemorrhage.   5.  Left pleural effusion.      CT-ABDOMEN-PELVIS W/O   Final Result      1.  Ill-defined hypodense hepatic lesions, incompletely characterized on this noncontrast study. In the setting of infection they may represent abscesses. Recommend further evaluation with contrast-enhanced CT or MRI.   2.  Small bilateral pleural effusions, right greater than left, and small volume ascites.   3.  Hyperdense material in the bladder lumen. It may represent small amount of hemorrhage related to catheter instrumentation, although urothelial bladder lesion is not excluded.   4.  Unchanged uterine fibroid.      US-RENAL   Final Result      1.  No hydronephrosis or renal abnormality.   2.  Bladder is decompressed by Velez catheter.       DX-CHEST-FOR LINE PLACEMENT Perform procedure in: Patient's Room   Final Result      1.  Satisfactory positioning of new right IJV multilumen dialysis catheter.      2.  No pneumothorax identified.      CT-HEAD W/O   Final Result      1.  Cerebral atrophy.      2.  Otherwise, Head CT without contrast within normal limits. No evidence of acute cerebral infarction, hemorrhage or mass lesion.      DX-CHEST-PORTABLE (1 VIEW)   Final Result      No acute cardiac or pulmonary abnormalities are identified.      Right IJ central line in place with tip at the expected location of the SVC/right atrial junction.           Assessment/Plan  * Acute respiratory failure with hypoxia (HCC)  Assessment & Plan  Patient has been extubated and remains on oxygen by nasal cannula for comfort.    Septic shock (HCC)  Assessment & Plan  No longer monitoring sepsis at this point with patient being comfort care.      GENESIS (acute kidney injury) ([facility])  Assessment & Plan  History of recent dialysis now with hepatorenal syndrome continue with comfort care.    Coagulopathy (HCC)  Assessment & Plan  Secondary to hepatic failure most recent INR is elevated    Hemorrhagic shock ([facility])  Assessment & Plan  Status post transfusion.    Upper GI bleed  Assessment & Plan  Status post EGD and was found to have esophagitis.  No longer monitoring H&H    Lactic acidosis  Assessment & Plan  No longer monitoring.      Metabolic acidosis  Assessment & Plan  Fluids given.  No longer monitoring metabolic status with comfort care.    Alcohol abuse  Assessment & Plan  Chronic patient admits to drinking 1/5 of vodka per day for many years    Elevated troponin  Assessment & Plan  We are no longer monitoring troponin levels.    Seizure (HCC)  Assessment & Plan  Patient reports having seizures after she stopped drinking alcohol.    Hyperkalemia  Assessment & Plan  Most recent potassium was 3.0.  We are not supplementing at this point       VTE prophylaxis: None

## 2020-02-01 NOTE — PROGRESS NOTES
BMS flushed and is leaking at rectum.     Pressure ulcer on sacrum bleeding. Barrier paste applied to sacrum.

## 2020-02-01 NOTE — PROGRESS NOTES
Assumed patient care at 1900. Pt is A&Ox4, comfort care,  and does not get out of bed. Bed alarm is on and in the low and locked position. Call light within reach, wearing non-slip socks, and rounded on hourly. Patient declines pain medication. Patient repositioned with q2hr turns. Rectal tube in place.     Patient's friends arrived from out of town and are staying in the [facility]. Patient had lifted spirits in comparison to previous night.

## 2020-02-01 NOTE — PROGRESS NOTES
[facility] Medicine Daily Progress Note    Date of Service  2/1/2020    Chief Complaint  54 y.o. female admitted 1/20/2020 with altered level of consciousness    Hospital Course    1/31/2020 patient is apparently a 54-year-old female who was drinking excessive amounts of vodka.  She was drinking at least 1/5-1/2 of vodka per day.  The patient significant other found her on the floor and brought her into the hospital.  The patient was having black melanotic stools and then later on turned to reddish stools.  The patient was found to have severe esophagitis on EGD.  She was placed on proton pump inhibitor.  She initially was in respiratory failure and thus had to be intubated and urgently mechanically ventilated.  Since then the patient has been made comfort care.  She is extubated and she is discharged to the medical floor.  At this point the patient is on comfort care measures.  2/1/2020- patient's family today is visiting.  I have spoken to him in extensive detail.  The patient's family would like to take her back home to [place] but they are trying to figure out how to do that safely.  Apparently they live about 150 miles from [place].  Patient otherwise has no new complaints.  She is still somewhat short of breath and has a headache.  She still having some abdominal discomfort.      Interval Problem Update  Comfort care   to assist family in possibly transferring the patient home to the family in [place]    Consultants/Specialty  None    Code Status  DNR/DNI/comfort care    Disposition  To be determined    Review of Systems  Review of Systems   Constitutional: Positive for malaise/fatigue and weight loss.   HENT: Negative.    Eyes: Negative.  Negative for photophobia and pain.   Respiratory: Negative.  Negative for sputum production and shortness of breath.    Cardiovascular: Negative.  Negative for claudication and leg swelling.   Gastrointestinal: Positive for abdominal pain, diarrhea and  heartburn.   Genitourinary: Negative.  Negative for dysuria and flank pain.   Musculoskeletal: Negative.  Negative for neck pain.   Skin: Negative.    Neurological: Positive for headaches.   Endo/Heme/Allergies: Negative.  Does not bruise/bleed easily.   Psychiatric/Behavioral: Positive for substance abuse. Negative for suicidal ideas.   All other systems reviewed and are negative.       Physical Exam  Temp:  [36.3 °C (97.4 °F)] 36.3 °C (97.4 °F)  Pulse:  [93] 93  Resp:  [18] 18  BP: (77)/(49) 77/49  SpO2:  [98 %] 98 %    Physical Exam  Vitals signs and nursing note reviewed. Exam conducted with a chaperone present.   Constitutional:       Appearance: She is well-developed. She is ill-appearing. She is not toxic-appearing or diaphoretic.   HENT:      Head: Normocephalic and atraumatic.      Right Ear: External ear normal.      Left Ear: External ear normal.      Nose: Nose normal.      Mouth/Throat:      Mouth: Mucous membranes are moist.      Pharynx: Oropharynx is clear.   Eyes:      Extraocular Movements: Extraocular movements intact.      Conjunctiva/sclera: Conjunctivae normal.      Pupils: Pupils are equal, round, and reactive to light.   Neck:      Musculoskeletal: Normal range of motion and neck supple.      Thyroid: No thyromegaly.      Vascular: No JVD.   Cardiovascular:      Rate and Rhythm: Tachycardia present. Rhythm irregular.      Pulses: Normal pulses.      Heart sounds: Normal heart sounds.   Pulmonary:      Effort: Pulmonary effort is normal. Prolonged expiration present.      Breath sounds: Normal breath sounds. Decreased air movement present.   Chest:      Chest wall: No tenderness.   Abdominal:      General: Bowel sounds are normal. There is no distension.      Palpations: Abdomen is soft. There is no mass.      Tenderness: There is no right CVA tenderness or left CVA tenderness.   Genitourinary:     Comments: Velez catheter  Musculoskeletal: Normal range of motion.         General: No  tenderness or signs of injury.      Right lower leg: No edema.      Left lower leg: No edema.   Lymphadenopathy:      Cervical: No cervical adenopathy.   Skin:     General: Skin is warm and dry.      Capillary Refill: Capillary refill takes 2 to 3 seconds.      Coloration: Skin is jaundiced.      Findings: Bruising present. No erythema or rash.   Neurological:      General: No focal deficit present.      Mental Status: She is alert and oriented to person, place, and time.      Cranial Nerves: No cranial nerve deficit.      Deep Tendon Reflexes: Reflexes are normal and symmetric.   Psychiatric:         Attention and Perception: She is inattentive.         Mood and Affect: Affect is flat.         Speech: Speech is delayed.         Behavior: Behavior is slowed.         Fluids  No intake or output data in the 24 hours ending 02/01/20 1346    Laboratory  Recent Labs     01/30/20  0455   WBC 17.8*   RBC 2.58*   HEMOGLOBIN 8.6*   HEMATOCRIT 26.9*   .3*   MCH 33.3*   MCHC 32.0*   RDW 95.4*   PLATELETCT 101*   MPV 13.7*     Recent Labs     01/30/20  0455   SODIUM 144   POTASSIUM 3.0*   CHLORIDE 102   CO2 24   GLUCOSE 160*   BUN 82*   CREATININE 3.33*   CALCIUM 7.4*                   Imaging  DX-CHEST-PORTABLE (1 VIEW)   Final Result      Stable chest with interstitial opacity most consistent with edema      DX-CHEST-PORTABLE (1 VIEW)   Final Result      Stable examination.      DX-CHEST-PORTABLE (1 VIEW)   Final Result      Stable cardiomegaly and interstitial infiltrates.      DX-CHEST-PORTABLE (1 VIEW)   Final Result         1. No significant interval change.      DX-CHEST-PORTABLE (1 VIEW)   Final Result         1. Interval placement of feeding tube. No significant interval change.      DX-ABDOMEN FOR TUBE PLACEMENT   Final Result      Feeding tube tip in the gastric body.      DX-CHEST-LIMITED (1 VIEW)   Final Result      1.  ETT terminates 1 cm above the lynda.   2.  Stable slight increase in bilateral pulmonary  opacities.      DX-CHEST-LIMITED (1 VIEW)   Final Result      Stable diffuse bilateral pulmonary infiltrates.      DX-CHEST-LIMITED (1 VIEW)   Final Result      1.  Increased patchy bilateral pulmonary airspace opacities suspicious for multifocal pneumonitis.   2.  Stable enlargement of the cardiomediastinal silhouette.   3.  Small right pleural effusion.      [id number]/O   Final Result      1. No focal hepatic lesion is seen. No abscess is seen. No area of restricted diffusion.      The finding on CT is most likely heterogeneous parenchyma and fatty infiltration.      2. Distended gallbladder with layering sludge.      3. Diffuse anasarca. Trace perihepatic ascites. Small right pleural effusion.      US-LIVER AND VESSELS COMPLETE (COMBO)   Final Result      1.  Ill-defined hypodense hepatic lesions identified on recent CT are not visualized on ultrasound presumably secondary to markedly increased hepatic echogenicity secondary to hepatic steatosis and/or diffuse hepatocellular disease. Consider MRI for    further evaluation if indicated.   2.  Hepatomegaly.   3.  Mild dilation of the main portal vein with hepatopedal flow.   4.  Bladder debris. Nonspecific finding which can be seen in setting infection, inflammation or hemorrhage.   5.  Left pleural effusion.      CT-ABDOMEN-PELVIS W/O   Final Result      1.  Ill-defined hypodense hepatic lesions, incompletely characterized on this noncontrast study. In the setting of infection they may represent abscesses. Recommend further evaluation with contrast-enhanced CT or MRI.   2.  Small bilateral pleural effusions, right greater than left, and small volume ascites.   3.  Hyperdense material in the bladder lumen. It may represent small amount of hemorrhage related to catheter instrumentation, although urothelial bladder lesion is not excluded.   4.  Unchanged uterine fibroid.      US-RENAL   Final Result      1.  No hydronephrosis or renal abnormality.   2.  Bladder is  decompressed by Velez catheter.      DX-CHEST-FOR LINE PLACEMENT Perform procedure in: Patient's Room   Final Result      1.  Satisfactory positioning of new right IJV multilumen dialysis catheter.      2.  No pneumothorax identified.      CT-HEAD W/O   Final Result      1.  Cerebral atrophy.      2.  Otherwise, Head CT without contrast within normal limits. No evidence of acute cerebral infarction, hemorrhage or mass lesion.      DX-CHEST-PORTABLE (1 VIEW)   Final Result      No acute cardiac or pulmonary abnormalities are identified.      Right IJ central line in place with tip at the expected location of the SVC/right atrial junction.           Assessment/Plan  * Acute respiratory failure with hypoxia (HCC)  Assessment & Plan  Oxygen by nasal cannula for comfort    Septic shock (HCC)  Assessment & Plan  Septic shock is resolved  Now comfort care      Hemorrhagic shock (HCC)  Assessment & Plan  Resolved now comfort care    Lactic acidosis  Assessment & Plan        Alcohol abuse  Assessment & Plan  Chronic with patient drinking 1/5 of vodka per day       VTE prophylaxis: None

## 2020-02-01 NOTE — CARE PLAN
Problem: Communication  Goal: The ability to communicate needs accurately and effectively will improve  Outcome: PROGRESSING AS EXPECTED  Note:   Patient is able to convey pain and desire to be repositioned.     Problem: Safety  Goal: Will remain free from falls  Outcome: PROGRESSING AS EXPECTED  Note:   Call light within reach, bed in the low and locked position, patient wearing non-slip socks, and rounded on hourly.

## 2020-02-01 NOTE — PROGRESS NOTES
Received bedside report and assumed care of patient at 0700.  Patient is A&Ox4.   Denies pain. Atropine given for comfort. Patient refusing most Q2 hours.Rectal tube in place and flushed. IV patent and flushes without resistance.  Bed is in the lowest position. Bed alarm is on. Call light is in reach.  Family at bedside. All questions and concerns answered.

## 2020-02-02 NOTE — CARE PLAN
Problem: Safety  Goal: Will remain free from falls  Outcome: PROGRESSING AS EXPECTED  Note:   Bed alarm active and sounding; Patient non-ambulatory; Patient has not fallen this admit.      Problem: Pain Management  Goal: Pain level will decrease to patient's comfort goal  Outcome: PROGRESSING AS EXPECTED  Note:   Patient denies any pain at this time.

## 2020-02-02 NOTE — CARE PLAN
Problem: Safety  Goal: Will remain free from injury  Outcome: PROGRESSING AS EXPECTED  Note:   Educated pt and family on safety measures, including bed alarm and using call light     Problem: Pain Management  Goal: Pain level will decrease to patient's comfort goal  Outcome: PROGRESSING AS EXPECTED  Note:   Educated pt and family on pharmacologic and non pharmacologic measures to reduce pain.

## 2020-02-02 NOTE — PROGRESS NOTES
Hospital Medicine Daily Progress Note    Date of Service  2/2/2020    Chief Complaint  54 y.o. female admitted 1/20/2020 with altered level of consciousness    Hospital Course    1/31/2020 patient is apparently a 54-year-old female who was drinking excessive amounts of vodka.  She was drinking at least 1/5-1/2 of vodka per day.  The patient significant other found her on the floor and brought her into the hospital.  The patient was having black melanotic stools and then later on turned to reddish stools.  The patient was found to have severe esophagitis on EGD.  She was placed on proton pump inhibitor.  She initially was in respiratory failure and thus had to be intubated and urgently mechanically ventilated.  Since then the patient has been made comfort care.  She is extubated and she is discharged to the medical floor.  At this point the patient is on comfort care measures.  2/1/2020- patient's family today is visiting.  I have spoken to him in extensive detail.  The patient's family would like to take her back home to [place] but they are trying to figure out how to do that safely.  Apparently they live about 150 miles from [place].  Patient otherwise has no new complaints.  She is still somewhat short of breath and has a headache.  She still having some abdominal discomfort.  2/2/2020- patient overnight has had a very restless night.  She remains at this point on comfort care measures.  Optimize pain management.  Family talk to me about taking her to [place].  Not sure that the patient will be able to travel.      Interval Problem Update  Continued on comfort care with pain management  Continue with oxygen for comfort   to assist with family wishes to to to take the patient back to [place].    Consultants/Specialty  None    Code Status  DNR/DNI/comfort care    Disposition  To be determined    Review of Systems  Review of Systems   Unable to perform ROS: Acuity of condition         Physical Exam       Physical Exam  Vitals signs and nursing note reviewed. Exam conducted with a chaperone present.   Constitutional:       General: She is sleeping.      Appearance: She is ill-appearing.      Interventions: Nasal cannula in place.   HENT:      Head: Normocephalic and atraumatic.      Right Ear: External ear normal.      Left Ear: External ear normal.      Nose: Nose normal.      Mouth/Throat:      Mouth: Mucous membranes are moist.   Eyes:      Extraocular Movements: Extraocular movements intact.      Conjunctiva/sclera: Conjunctivae normal.      Pupils: Pupils are equal, round, and reactive to light.   Neck:      Musculoskeletal: Normal range of motion and neck supple.      Thyroid: No thyromegaly.      Vascular: No JVD.   Cardiovascular:      Rate and Rhythm: Tachycardia present. Rhythm irregular.      Pulses: Normal pulses.      Heart sounds: Normal heart sounds.   Pulmonary:      Effort: Tachypnea and accessory muscle usage present.      Breath sounds: Decreased air movement present. Examination of the right-lower field reveals decreased breath sounds. Examination of the left-lower field reveals decreased breath sounds. Decreased breath sounds present.   Chest:      Chest wall: No tenderness.   Abdominal:      General: Bowel sounds are normal. There is distension.      Palpations: Abdomen is soft. There is fluid wave and hepatomegaly.      Tenderness: There is tenderness.   Genitourinary:     Comments: Velez catheter  Musculoskeletal: Normal range of motion.         General: No tenderness or signs of injury.      Right lower leg: No edema.      Left lower leg: No edema.   Skin:     General: Skin is warm and dry.      Capillary Refill: Capillary refill takes 2 to 3 seconds.      Coloration: Skin is jaundiced.      Findings: Bruising present. No erythema or rash.   Neurological:      General: No focal deficit present.      Mental Status: She is oriented to person, place, and time. She is  lethargic.      Cranial Nerves: No cranial nerve deficit.      Deep Tendon Reflexes: Reflexes are normal and symmetric.   Psychiatric:         Attention and Perception: She is inattentive.         Mood and Affect: Affect is flat.         Speech: Speech is delayed.         Behavior: Behavior is uncooperative and slowed.         Fluids    Intake/Output Summary (Last 24 hours) at 2/2/2020 1048  Last data filed at 2/1/2020 2230  Gross per 24 hour   Intake 40 ml   Output --   Net 40 ml       Laboratory                        Imaging  DX-CHEST-PORTABLE (1 VIEW)   Final Result      Stable chest with interstitial opacity most consistent with edema      DX-CHEST-PORTABLE (1 VIEW)   Final Result      Stable examination.      DX-CHEST-PORTABLE (1 VIEW)   Final Result      Stable cardiomegaly and interstitial infiltrates.      DX-CHEST-PORTABLE (1 VIEW)   Final Result         1. No significant interval change.      DX-CHEST-PORTABLE (1 VIEW)   Final Result         1. Interval placement of feeding tube. No significant interval change.      DX-ABDOMEN FOR TUBE PLACEMENT   Final Result      Feeding tube tip in the gastric body.      DX-CHEST-LIMITED (1 VIEW)   Final Result      1.  ETT terminates 1 cm above the lynda.   2.  Stable slight increase in bilateral pulmonary opacities.      DX-CHEST-LIMITED (1 VIEW)   Final Result      Stable diffuse bilateral pulmonary infiltrates.      DX-CHEST-LIMITED (1 VIEW)   Final Result      1.  Increased patchy bilateral pulmonary airspace opacities suspicious for multifocal pneumonitis.   2.  Stable enlargement of the cardiomediastinal silhouette.   3.  Small right pleural effusion.      [id number]/O   Final Result      1. No focal hepatic lesion is seen. No abscess is seen. No area of restricted diffusion.      The finding on CT is most likely heterogeneous parenchyma and fatty infiltration.      2. Distended gallbladder with layering sludge.      3. Diffuse anasarca. Trace perihepatic  ascites. Small right pleural effusion.      US-LIVER AND VESSELS COMPLETE (COMBO)   Final Result      1.  Ill-defined hypodense hepatic lesions identified on recent CT are not visualized on ultrasound presumably secondary to markedly increased hepatic echogenicity secondary to hepatic steatosis and/or diffuse hepatocellular disease. Consider MRI for    further evaluation if indicated.   2.  Hepatomegaly.   3.  Mild dilation of the main portal vein with hepatopedal flow.   4.  Bladder debris. Nonspecific finding which can be seen in setting infection, inflammation or hemorrhage.   5.  Left pleural effusion.      CT-ABDOMEN-PELVIS W/O   Final Result      1.  Ill-defined hypodense hepatic lesions, incompletely characterized on this noncontrast study. In the setting of infection they may represent abscesses. Recommend further evaluation with contrast-enhanced CT or MRI.   2.  Small bilateral pleural effusions, right greater than left, and small volume ascites.   3.  Hyperdense material in the bladder lumen. It may represent small amount of hemorrhage related to catheter instrumentation, although urothelial bladder lesion is not excluded.   4.  Unchanged uterine fibroid.      US-RENAL   Final Result      1.  No hydronephrosis or renal abnormality.   2.  Bladder is decompressed by Velez catheter.      DX-CHEST-FOR LINE PLACEMENT Perform procedure in: Patient's Room   Final Result      1.  Satisfactory positioning of new right IJV multilumen dialysis catheter.      2.  No pneumothorax identified.      CT-HEAD W/O   Final Result      1.  Cerebral atrophy.      2.  Otherwise, Head CT without contrast within normal limits. No evidence of acute cerebral infarction, hemorrhage or mass lesion.      DX-CHEST-PORTABLE (1 VIEW)   Final Result      No acute cardiac or pulmonary abnormalities are identified.      Right IJ central line in place with tip at the expected location of the SVC/right atrial junction.            Assessment/Plan  * Acute respiratory failure with hypoxia (HCC)  Assessment & Plan  Remains on oxygen by nasal cannula for comfort.    Septic shock (HCC)  Assessment & Plan        Lactic acidosis  Assessment & Plan        Alcohol abuse  Assessment & Plan  Patient was drinking excessive alcohol.  The alcohol at this point has completely destroyed her liver.  The patient now is in hepatorenal failure.  Continue with comfort care measures       VTE prophylaxis: None

## 2020-02-02 NOTE — PROGRESS NOTES
Received report and assumed care of patient at 1900. AOx4, Lethargic; Comfort care; Family at bedside Non-ambulatory; Q2 turns in place (as patient tolerates and/or allows); PIV flushes, no blood return noted, saline locked; Rectal tube in place and flushed; Incontinence and perineal care provided; No complaints of pain at this time. Bed locked and in lowest position; Alarms active and sounding; Call light and personal belongings within reach; Hourly rounding in place.

## 2020-02-03 NOTE — DISCHARGE SUMMARY
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    Comorbid Conditions at the Time of Death  Principal Problem:    Acute respiratory failure with hypoxia (HCC) POA: Unknown  Active Problems:    Septic shock (HCC) POA: Unknown    Alcohol abuse POA: Unknown    Hemorrhagic shock (HCC) POA: Unknown    Coagulopathy (HCC) POA: Unknown  Resolved Problems:    * No resolved hospital problems. *      History of Presenting Illness and Hospital Course  This is a 54 y.o. female admitted 1/20/2020 with altered mental status.  The patient was brought in by her family member.  The patient was evaluated found to have gastrointestinal bleeding as well as ascites with cirrhosis of the liver.  The patient went into hepatorenal failure.  Family was involved at that point and patient was placed comfort care.  Patient was then transferred out of the ICU on to the medical floor.  Here I had the opportunity talk to mom and dad and a brother.  They were initially interested in taking the patient back to [place].  However after explaining her condition they realized that there was no other way to get the patient back.  The patient today passed away quietly under comfort care measures.  I was able to pronounce the patient myself.    Death Date: 02/03/20   Death Time: 0928         Pronounced By (RN1): Darrell Ratliff MD going

## 2020-02-03 NOTE — PROGRESS NOTES
Received report and assumed care of patient at 1900. AOx4, Lethargic; Comfort care; Family at bedside Non-ambulatory; Patient refusing Q2 turns; PIV flushes, no blood return noted, saline locked; Rectal tube in place and flushed; Purewick in place; No complaints of pain at this time. Bed locked and in lowest position; Alarms active and sounding; Call light and personal belongings within reach; Hourly rounding in place.

## 2020-02-03 NOTE — CARE PLAN
Problem: Pain Management  Goal: Pain level will decrease to patient's comfort goal  Outcome: PROGRESSING AS EXPECTED  Note:   Patient denies any pain.      Problem: Skin Integrity  Goal: Risk for impaired skin integrity will decrease  Outcome: PROGRESSING SLOWER THAN EXPECTED  Note:   Patient does not turn self in bed; Patient refusing Q2 turns.

## 2020-02-03 NOTE — PROGRESS NOTES
Pt A/O to self and place. Makes nonsensical comments at times. Remains on comfort care. Denies pain. Reporting mild nausea this evening, IV ativan given with effect. Pt bedbound at this time, refusing repositioning despite education. Reported L buttocks PU, unable to assess as pt refused to be repositioned. Rectal tube in place for liquid diarrhea, bag emptied this shift. Purewick external catheter in place, draining minimal amounts of clear yellow urine. Pt refusing indwelling catheter at this time. Bed locked and in lowest position; Alarms active and sounding; Call light and personal belongings within reach; Hourly rounding in place. Supportive family at bedside.

## 2020-02-03 NOTE — PALLIATIVE CARE
Palliative Care follow-up  Spoke with Emili who stated that she and Regino had been working with [employer] to make arrangements. Emili stated that the pts step-brother was able to arrange for tickets for the pts father and brother, Regino and Graeme, to fly out and be with the pt over the weekend.   Emili asked that when the pt passes if we could call Regino first, let her know I would update the bedside RN.     Arrived at the bedside just as pt passed. Elpidio at the bedside, updated Elena on the phone.   Provided some comfort as able to Elpidio, let bedside rN know I will call the pts father and that the family is currently working with [employer] for arrangements.     Called and Emili answered the phone, stated that Regino was sitting next to her. Update family on Cherelle's passing. Emili stated that she will continue to attempt to complete the paperwork with the mortuary today with electronic signatures.     Called and updated pts ex-wife, Lizette, with families permission on Cherelle's passing.    Plan: Family is working on completing paperwork with [employer].     Thank you for allowing Palliative Care to support this patient and family. Contact [phone] for additional assistance, change in patient status, or with any questions/concerns.

## 2020-02-03 NOTE — DISCHARGE PLANNING
LSW met with Pt's friend, Elpidio, at bedside to provide resources.  Elpidio was on the telephone and requested for this LSW to check back in.      LSW checked back in with Elpidio at Pt's bedside.  Elpidio was still on the telephone.  This LSW requested for him to ask the bedside RN to contact me when he is free to talk.  Elpidio reported he would.

## 2020-02-03 NOTE — THERAPY
Speech Therapy Contact Note    SLP will no longer actively follow as pt has now transitioned to comfort care, but will remain available for education.

## 2020-02-04 NOTE — PROGRESS NOTES
Patient passed away at 0928 this am. Friend Elpidio at bedside until 1400.   and tissue bank notified within 1 hour of pronunciation. Line removed after Elpidio left. Elpidio stated he took all belongings.  Patient bathed and ready for transport.

## 2020-02-04 NOTE — PALLIATIVE CARE
Spiritual Care Note    Patient Information     Patient's Name: Dyan Guevara   MRN: [id number]    YOB: 1965   Age and Gender: 54 y.o. 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   Unable to determine  Would you like to receive a visit from our Spiritual Care team or your own Mosque or spiritual leader?   No      Encounter/Request Information  Visited With:      Elpidio mulligan  Nature of the Visit:     Follow-up, On shift  Crisis Visit:      Death of patient  Referral From/Origin of Request:   Verbal staff, Phone      Religous Needs/Values  Yarsanism Needs:     Prayer      Spiritual Assessment   Observations/Symptoms:    Patient's body lying in bed.  Friend bedside tearful.  Interacton/Conversation:    Patient's friend asked for prayer.  Assessment:      Distress  Distress:      Grief/Loss/Bereavement  Interventions:      Compassionate Presence, Reflective Listening, Prayer  Outcomes:      Emotional Release, Progress with Grief

## 2022-04-25 NOTE — CARE PLAN
Problem: Bowel/Gastric:  Goal: Normal bowel function is maintained or improved  Outcome: NOT MET  Note:   Frequent, loose bowel movements. Spoke with MD regarding plan of care. Orders received and carried out.     Problem: Respiratory:  Goal: Respiratory status will improve  Outcome: PROGRESSING AS EXPECTED  Note:   Patient's Spo2 <90% while turning. O2 increased to 3 L.       Statement Selected

## 2024-10-08 NOTE — ED NOTES
"Patient refusing to have blood drawn because patient states \"this isnt the police department, you dont need to know my blood alcohol level.\"  Patient starts getting ready to leave until Dr. Vitale states okay for patient to refuse  " [de-identified] : 10/8/24 SR @ 86 bpm, LBBB QRS duration 134 ms  [de-identified] : Yg 7/31 - 8/14/24: SR (46-), PAF 4% burden with HR range 53-), longest 5 hours 56 mins [de-identified] : TTE 9/17/24 1. Left ventricular systolic function is moderately decreased with an ejection fraction visually estimated at 45 % and Kaba's biplane of 45% 2. Abnormal septal motion consistent with left bundle branch block. 3. There is mild (grade 1) left ventricular diastolic dysfunction. 4. Left atrium is mildly dilated. 5. Trace mitral regurgitation. 6. Mild tricuspid regurgitation. 7. Trace pulmonic regurgitation. 8. Estimated pulmonary artery systolic pressure is 18 mmHg (IVC is not well visualized, RAP estimated 3mmHg). 9. Compared to prior done on 6/21/2024, increase in EF, septum consistent with LBBB. 10. Apical septal segment, apical lateral segment, and apex are abnormal.  TTE 6/23/24 1. Left ventricular systolic function is moderately to severely decreased with an ejection fraction of 31 % by Kaba's method of disks with an ejection fraction visually estimated at 25 to 30 %. 2. There is moderate (grade 2) left ventricular diastolic dysfunction. 3. Entire inferior wall, Entire inferoseptal wall, basal and mid anterior septum, apical anterior segment is abnormal. Severly hypokinetic apex. 4. The left atrium is mildly dilated. 5. Aortic arch is not well visualized. 6. There is focal calcification of the aortic valve leaflets (LCC) 7. Fibrocalcific aortic valve sclerosis without stenosis. 8. Thickened mitral valve leaflets. 9. Moderate mitral regurgitation. Regurg fraction=24% 10. Mild to moderate tricuspid regurgitation. 11. Mild pulmonic regurgitation. 12. Estimated pulmonary artery systolic pressure is 33 mmHg. 13. No pericardial effusion seen. 14. Normal pericardium. 15. Compared to outside prior on 11/2019- Decrease in overall LVEF with multiple segmental wall motion abnormalities.
